# Patient Record
Sex: MALE | Race: WHITE | NOT HISPANIC OR LATINO | Employment: FULL TIME | URBAN - NONMETROPOLITAN AREA
[De-identification: names, ages, dates, MRNs, and addresses within clinical notes are randomized per-mention and may not be internally consistent; named-entity substitution may affect disease eponyms.]

---

## 2018-03-09 ENCOUNTER — TELEPHONE (OUTPATIENT)
Dept: UROLOGY | Facility: CLINIC | Age: 48
End: 2018-03-09

## 2018-03-09 DIAGNOSIS — R10.9 FLANK PAIN: Primary | ICD-10-CM

## 2018-03-12 ENCOUNTER — OFFICE VISIT (OUTPATIENT)
Dept: UROLOGY | Facility: CLINIC | Age: 48
End: 2018-03-12

## 2018-03-12 ENCOUNTER — HOSPITAL ENCOUNTER (OUTPATIENT)
Dept: GENERAL RADIOLOGY | Facility: HOSPITAL | Age: 48
Discharge: HOME | End: 2018-03-12
Attending: UROLOGY | Admitting: UROLOGY

## 2018-03-12 VITALS — TEMPERATURE: 96.4 F | HEIGHT: 74 IN | WEIGHT: 315 LBS | BODY MASS INDEX: 40.43 KG/M2

## 2018-03-12 DIAGNOSIS — N20.0 NEPHROLITHIASIS: ICD-10-CM

## 2018-03-12 DIAGNOSIS — R10.9 FLANK PAIN: ICD-10-CM

## 2018-03-12 DIAGNOSIS — R10.9 FLANK PAIN: Primary | ICD-10-CM

## 2018-03-12 LAB
BILIRUB BLD-MCNC: NEGATIVE MG/DL
CLARITY, POC: CLEAR
COLOR UR: YELLOW
GLUCOSE UR STRIP-MCNC: NEGATIVE MG/DL
KETONES UR QL: NEGATIVE
LEUKOCYTE EST, POC: NEGATIVE
NITRITE UR-MCNC: NEGATIVE MG/ML
PH UR: 5 [PH] (ref 5–8)
PROT UR STRIP-MCNC: NEGATIVE MG/DL
RBC # UR STRIP: NEGATIVE /UL
SP GR UR: 1.02 (ref 1–1.03)
UROBILINOGEN UR QL: NORMAL

## 2018-03-12 PROCEDURE — 74018 RADEX ABDOMEN 1 VIEW: CPT

## 2018-03-12 PROCEDURE — 81001 URINALYSIS AUTO W/SCOPE: CPT | Performed by: UROLOGY

## 2018-03-12 PROCEDURE — 99204 OFFICE O/P NEW MOD 45 MIN: CPT | Performed by: UROLOGY

## 2018-03-12 NOTE — PROGRESS NOTES
Subjective    Mr. Reddy is 47 y.o. male    Chief Complaint: Nephrolithiasis    History of Present Illness     Recurrent Nephrolithiasis  Patient is her for recurrent nephrolithiasis.  Location of stone is bilateral renal. Stones were found for workup of abdominal pain.  Pt is currently Asymptomatic.  Pt. Has had stone disease for severalyear(s). Risk factors for stone disease include obesity. Previous management of stone disease was ESWL.  Stone analysis was Unknown.  Metabolic workup revealed unknown.  Current treatment regimen includes hydration.  Associated symptoms include none.          The following portions of the patient's history were reviewed and updated as appropriate: allergies, current medications, past family history, past medical history, past social history, past surgical history and problem list.    Review of Systems   Constitutional: Negative for appetite change, chills, fever and unexpected weight change.   HENT: Negative for congestion, ear pain, facial swelling, hearing loss, nosebleeds, trouble swallowing and voice change.    Eyes: Negative for photophobia, pain, discharge and visual disturbance.   Respiratory: Negative for cough, choking, chest tightness and shortness of breath.    Cardiovascular: Negative for chest pain and palpitations.   Gastrointestinal: Negative for abdominal distention, abdominal pain, blood in stool, constipation, diarrhea, nausea and vomiting.   Endocrine: Negative for cold intolerance, heat intolerance and polydipsia.   Genitourinary: Positive for flank pain. Negative for discharge, dysuria, frequency, genital sores, hematuria, scrotal swelling, testicular pain and urgency.   Musculoskeletal: Negative for arthralgias, joint swelling, neck pain and neck stiffness.   Skin: Negative for pallor and rash.   Allergic/Immunologic: Negative for immunocompromised state.   Neurological: Negative for dizziness, tremors, seizures, syncope, light-headedness and headaches.  "  Hematological: Negative for adenopathy. Does not bruise/bleed easily.   Psychiatric/Behavioral: Negative for agitation, confusion, dysphoric mood, hallucinations, self-injury and suicidal ideas.       No current outpatient prescriptions on file.    Past Medical History:   Diagnosis Date   • Kidney stone        Past Surgical History:   Procedure Laterality Date   • BACK SURGERY     • EXTRACORPOREAL SHOCK WAVE LITHOTRIPSY (ESWL)         Social History     Social History   • Marital status:      Social History Main Topics   • Smoking status: Never Smoker   • Smokeless tobacco: Never Used   • Drug use: Unknown     Other Topics Concern   • Not on file       Family History   Problem Relation Age of Onset   • No Known Problems Father    • No Known Problems Mother        Objective    Temp 96.4 °F (35.8 °C)   Ht 188 cm (74\")   Wt (!) 159 kg (350 lb)   BMI 44.94 kg/m²     Physical Exam   Constitutional: He is oriented to person, place, and time. He appears well-developed and well-nourished. No distress.   HENT:   Head: Normocephalic and atraumatic.   Right Ear: External ear and ear canal normal.   Left Ear: External ear and ear canal normal.   Nose: No nasal deformity. No epistaxis.   Mouth/Throat: Oropharynx is clear and moist. Mucous membranes are not pale, not dry and not cyanotic. Normal dentition. No oropharyngeal exudate.   Neck: Trachea normal. No tracheal tenderness present. No tracheal deviation present. No thyroid mass and no thyromegaly present.   Pulmonary/Chest: Effort normal. No accessory muscle usage. No respiratory distress. Chest wall is not dull to percussion (No flatness or hyperresonance). He exhibits no mass and no tenderness.   On palpation, no tactile fremitus. All movements are symmetric. No intercostal retraction noted.    Abdominal: Soft. Normal appearance. He exhibits no distension and no mass. There is no hepatosplenomegaly. There is no tenderness. No hernia.   Rectal examination or " stool specimen is not indicated.    Musculoskeletal:   Normal gait and station. The spine, ribs, and pelvis are examined. No obvious misalignment or asymmetry. ROM is reasonable for age. No instability. No obvious atrophy, flaccidity or spasticity.    Lymphadenopathy:     He has no cervical adenopathy.        Right: No inguinal adenopathy present.        Left: No inguinal adenopathy present.   Neurological: He is alert and oriented to person, place, and time.   Skin: Skin is warm, dry and intact. No lesion and no rash noted. He is not diaphoretic. No cyanosis. No pallor. Nails show no clubbing.   On palpation, there were no induration, subcutaneous nodules, or tightening   Psychiatric: His speech is normal and behavior is normal. Judgment and thought content normal. His mood appears not anxious. His affect is not labile. He does not exhibit a depressed mood.   Vitals reviewed.    KUB independent review    A KUB is available for me to review today.  The image is inspected for a bowel gas pattern and the general bone structure of the spine and pelvis. The kidneys are then inspected closely.  Renal outline is noted if identifiable. The kidney, collecting system, and anticipated path of the ureter are examined for calcifications including those in the true pelvis.  This film reveals:    On the right there are no calcificaitons seen in the kidney or the expected course of the ureter. .    On the left there is a single renal stone measuring 6 mm.        CT independent review  The CT scan of the abdomen/pelvis done without contrast is available for me to review.  Treatment recommendations require an independent review.  First I scanned the liver, spleen, and bowel pattern.  The retroperitoneum including the major vessels and lymphatic packages are briefly reviewed.  This film as been reviewed by the radiologist to determine any non urologic abnormalities that are present.  The kidneys are closely inspected for size,  symmetry, contour, parenchymal thickness, perinephric reaction, presence of calcifications, and intrarenal dilation of the collecting system.  The ureters are inspected for their course, caliber, and any calcifications.  The bladder is inspected for its thickness, size, and presence of any calcifications.  This scan shows:    The right kidney appears multiple non obstructing nephrolithiasis    The left kidney appears 6mm non obstructing nephrolihiasis    The bladder appears normal on this non-contrasted CT scan.  The bladder appears normal in thickness.  There no masses or stones seen on this exam.    Results for orders placed or performed in visit on 03/12/18   POC Urinalysis Dipstick, Automated   Result Value Ref Range    Color Yellow Yellow, Straw, Dark Yellow, Liya    Clarity, UA Clear Clear    Glucose, UA Negative Negative, 1000 mg/dL (3+) mg/dL    Bilirubin Negative Negative    Ketones, UA Negative Negative    Specific Gravity  1.020 1.005 - 1.030    Blood, UA Negative Negative    pH, Urine 5.0 5.0 - 8.0    Protein, POC Negative Negative mg/dL    Urobilinogen, UA Normal Normal    Leukocytes Negative Negative    Nitrite, UA Negative Negative     Assessment and Plan    Randell was seen today for flank pain.    Diagnoses and all orders for this visit:    Flank pain  -     POC Urinalysis Dipstick, Automated    Nephrolithiasis    I recommended a full metabolic evaluation with serum calcium, PTH and uric acid.  I Jaxon reviewed his images with the findings mentioned above.  I do not think he has a stone in his right ureter is described by the radiologist.  He is having minimal pain always stones on the right kidney are very passable anyway.  This was a stone in the left lower pole I have recommended observation as he had had difficulty in the past with ESWL.  We will get a full metabolic evaluation to 24 urine.    Watchful Waiting  Mr. Reddy has been diagnosed with renal urolithiasis. The patient's options for  therapy are discussed based on the size, location, stone burden, and symptoms.  The decision has been made to follow these stones radiographically without treatment.  The patient understands the risks associated with the conservative approach, but this is a reasonable treatment plan.  The need to contract me for hematuria, fever, recurrent UTI's, flank pain or change in ideology is explained.

## 2018-03-16 ENCOUNTER — HOSPITAL ENCOUNTER (EMERGENCY)
Facility: HOSPITAL | Age: 48
Discharge: HOME | End: 2018-03-17
Attending: EMERGENCY MEDICINE | Admitting: EMERGENCY MEDICINE

## 2018-03-16 DIAGNOSIS — T78.40XA ALLERGIC REACTION, INITIAL ENCOUNTER: Primary | ICD-10-CM

## 2018-03-16 PROCEDURE — 99283 EMERGENCY DEPT VISIT LOW MDM: CPT

## 2018-03-16 RX ORDER — METHYLPREDNISOLONE SODIUM SUCCINATE 125 MG/2ML
125 INJECTION, POWDER, LYOPHILIZED, FOR SOLUTION INTRAMUSCULAR; INTRAVENOUS ONCE
Status: COMPLETED | OUTPATIENT
Start: 2018-03-16 | End: 2018-03-17

## 2018-03-16 RX ORDER — DIPHENHYDRAMINE HYDROCHLORIDE 50 MG/ML
25 INJECTION INTRAMUSCULAR; INTRAVENOUS ONCE
Status: COMPLETED | OUTPATIENT
Start: 2018-03-16 | End: 2018-03-17

## 2018-03-16 RX ORDER — FAMOTIDINE 10 MG/ML
20 INJECTION, SOLUTION INTRAVENOUS ONCE
Status: COMPLETED | OUTPATIENT
Start: 2018-03-16 | End: 2018-03-17

## 2018-03-16 RX ORDER — SODIUM CHLORIDE 0.9 % (FLUSH) 0.9 %
10 SYRINGE (ML) INJECTION AS NEEDED
Status: DISCONTINUED | OUTPATIENT
Start: 2018-03-16 | End: 2018-03-17 | Stop reason: HOSPADM

## 2018-03-16 RX ORDER — EPINEPHRINE 0.3 MG/.3ML
0.3 INJECTION SUBCUTANEOUS ONCE
Status: COMPLETED | OUTPATIENT
Start: 2018-03-16 | End: 2018-03-17

## 2018-03-17 VITALS
DIASTOLIC BLOOD PRESSURE: 92 MMHG | OXYGEN SATURATION: 94 % | SYSTOLIC BLOOD PRESSURE: 169 MMHG | HEART RATE: 84 BPM | TEMPERATURE: 97.8 F | HEIGHT: 74 IN | RESPIRATION RATE: 18 BRPM | BODY MASS INDEX: 40.43 KG/M2 | WEIGHT: 315 LBS

## 2018-03-17 PROCEDURE — 25010000002 DIPHENHYDRAMINE PER 50 MG: Performed by: EMERGENCY MEDICINE

## 2018-03-17 PROCEDURE — 96375 TX/PRO/DX INJ NEW DRUG ADDON: CPT

## 2018-03-17 PROCEDURE — 96372 THER/PROPH/DIAG INJ SC/IM: CPT

## 2018-03-17 PROCEDURE — 96361 HYDRATE IV INFUSION ADD-ON: CPT

## 2018-03-17 PROCEDURE — 25010000002 METHYLPREDNISOLONE PER 125 MG: Performed by: EMERGENCY MEDICINE

## 2018-03-17 PROCEDURE — 96374 THER/PROPH/DIAG INJ IV PUSH: CPT

## 2018-03-17 RX ORDER — PREDNISONE 20 MG/1
20 TABLET ORAL 2 TIMES DAILY
Qty: 10 TABLET | Refills: 0 | Status: SHIPPED | OUTPATIENT
Start: 2018-03-17 | End: 2018-03-22

## 2018-03-17 RX ADMIN — FAMOTIDINE 20 MG: 10 INJECTION INTRAVENOUS at 00:41

## 2018-03-17 RX ADMIN — EPINEPHRINE 0.3 MG: 0.3 INJECTION INTRAMUSCULAR at 00:25

## 2018-03-17 RX ADMIN — METHYLPREDNISOLONE SODIUM SUCCINATE 125 MG: 125 INJECTION, POWDER, FOR SOLUTION INTRAMUSCULAR; INTRAVENOUS at 00:44

## 2018-03-17 RX ADMIN — SODIUM CHLORIDE 1000 ML: 9 INJECTION, SOLUTION INTRAVENOUS at 00:38

## 2018-03-17 RX ADMIN — DIPHENHYDRAMINE HYDROCHLORIDE 25 MG: 50 INJECTION, SOLUTION INTRAMUSCULAR; INTRAVENOUS at 00:39

## 2018-03-17 NOTE — DISCHARGE INSTRUCTIONS
Allergies  An allergy is when your body reacts to a substance in a way that is not normal. An allergic reaction can happen after you:  · Eat something.  · Breathe in something.  · Touch something.  You can be allergic to:  · Things that are only around during certain seasons, like molds and pollens.  · Foods.  · Drugs.  · Insects.  · Animal dander.  What are the signs or symptoms?  · Puffiness (swelling). This may happen on the lips, face, tongue, mouth, or throat.  · Sneezing.  · Coughing.  · Breathing loudly (wheezing).  · Stuffy nose.  · Tingling in the mouth.  · A rash.  · Itching.  · Itchy, red, puffy areas of skin (hives).  · Watery eyes.  · Throwing up (vomiting).  · Watery poop (diarrhea).  · Dizziness.  · Feeling faint or fainting.  · Trouble breathing or swallowing.  · A tight feeling in the chest.  · A fast heartbeat.  How is this diagnosed?  Allergies can be diagnosed with:  · A medical and family history.  · Skin tests.  · Blood tests.  · A food diary. A food diary is a record of all the foods, drinks, and symptoms you have each day.  · The results of an elimination diet. This diet involves making sure not to eat certain foods and then seeing what happens when you start eating them again.  How is this treated?  There is no cure for allergies, but allergic reactions can be treated with medicine. Severe reactions usually need to be treated at a hospital.  How is this prevented?  The best way to prevent an allergic reaction is to avoid the thing you are allergic to. Allergy shots and medicines can also help prevent reactions in some cases.  This information is not intended to replace advice given to you by your health care provider. Make sure you discuss any questions you have with your health care provider.  Document Released: 04/14/2014 Document Revised: 08/14/2017 Document Reviewed: 09/29/2015  [employer] Interactive Patient Education © 2017 [employer]

## 2018-03-17 NOTE — ED PROVIDER NOTES
Subjective   History of Present Illness     47-year-old male is complaining about an allergic reaction.  The patient states that he was eating dinner and while doing so he began to feel his skin breakout.  Patient took a photo which appears to be urticarial.  He took a Benadryl home.,  Interview the patient reports his rash is resolved, however he reports that his voice feels hoarse.    Review of Systems   HENT: Positive for voice change.    Respiratory: Negative for apnea, cough, choking, chest tightness, shortness of breath, wheezing and stridor.    Cardiovascular: Negative for chest pain, palpitations and leg swelling.   Gastrointestinal: Negative for abdominal distention, abdominal pain, anal bleeding, blood in stool, constipation, diarrhea, nausea, rectal pain and vomiting.   Neurological: Negative for dizziness, syncope, facial asymmetry, weakness, light-headedness and headaches.       Past Medical History:   Diagnosis Date   • Kidney stone        No Known Allergies    Past Surgical History:   Procedure Laterality Date   • BACK SURGERY     • EXTRACORPOREAL SHOCK WAVE LITHOTRIPSY (ESWL)         Family History   Problem Relation Age of Onset   • No Known Problems Father    • No Known Problems Mother        Social History     Social History   • Marital status:      Social History Main Topics   • Smoking status: Never Smoker   • Smokeless tobacco: Never Used   • Drug use: Unknown     Other Topics Concern   • Not on file           Objective   Physical Exam   Constitutional: He is oriented to person, place, and time. He appears well-developed.   HENT:   Head: Normocephalic.   Left Ear: External ear normal.   Nose: Nose normal.   Mouth/Throat: Oropharynx is clear and moist.   Eyes: EOM are normal. Pupils are equal, round, and reactive to light.   Neck: No JVD present. No tracheal deviation present. No thyromegaly present.   Cardiovascular: Normal rate, regular rhythm and normal heart sounds.  Exam reveals no  gallop and no friction rub.    No murmur heard.  Pulmonary/Chest: Effort normal and breath sounds normal. No respiratory distress. He has no wheezes. He has no rales. He exhibits no tenderness.   Abdominal: Soft. He exhibits no distension and no mass. There is no tenderness. There is no rebound and no guarding. No hernia.   Musculoskeletal: Normal range of motion.   Lymphadenopathy:     He has no cervical adenopathy.   Neurological: He is alert and oriented to person, place, and time. He has normal reflexes. He displays normal reflexes. No cranial nerve deficit. Coordination normal.       Procedures         ED Course  ED Course   Comment By Time   On discharge reassessment, patient is resting comfortably, is in no distress, has no dyspnea, is tolerating secretions, has no wheezing, no rash.  Patient was instructed to return to the ER for any worsening symtoms.   Aniket Ellis DO 03/17 0301                  [facility]    Final diagnoses:   Allergic reaction, initial encounter            Aniket Ellis DO  03/17/18 0304

## 2018-03-19 NOTE — ED NOTES
"ED Call Back Questions    1. How are you doing since leaving the Emergency Department?    Better.  [facility]  2. Do you have any questions about your discharge instructions? No     3. Have you filled your new prescriptions yet? Yes   a. Do you have any questions about those medications? No     4. Were you able to make a follow-up appointment with the physician? Yes     5. Do you have a primary care physician? Yes   a. If No, would you like for me to set you up with one? N/A  i. If Yes, “I will have our ED  give you a call right back at this number to work with you on the best time for an appointment.”    6. We are always looking to get better at what we do. Do you have any suggestions for what we can do to be even better? No   a. If Yes, \"Thank you for sharing your concerns. I apologize. I will follow up with our manager and patient . Would you like someone to call you back?\" N/A    7. Is there anything else I can do for you? No     "

## 2018-04-16 DIAGNOSIS — N20.0 NEPHROLITHIASIS: ICD-10-CM

## 2018-04-17 LAB
CALCIUM SERPL-MCNC: 9.7 MG/DL (ref 8.4–10.4)
PTH-INTACT SERPL-MCNC: 20 PG/ML (ref 15–65)
URATE SERPL-MCNC: 6.2 MG/DL (ref 3.5–8.5)

## 2018-04-23 ENCOUNTER — RESULTS ENCOUNTER (OUTPATIENT)
Dept: UROLOGY | Facility: CLINIC | Age: 48
End: 2018-04-23

## 2018-04-23 DIAGNOSIS — N20.0 NEPHROLITHIASIS: ICD-10-CM

## 2018-05-02 ENCOUNTER — OFFICE VISIT (OUTPATIENT)
Dept: UROLOGY | Facility: CLINIC | Age: 48
End: 2018-05-02

## 2018-05-02 VITALS — BODY MASS INDEX: 40.43 KG/M2 | WEIGHT: 315 LBS | TEMPERATURE: 95.4 F | HEIGHT: 74 IN

## 2018-05-02 DIAGNOSIS — N20.0 NEPHROLITHIASIS: Primary | ICD-10-CM

## 2018-05-02 LAB
BILIRUB BLD-MCNC: NEGATIVE MG/DL
CLARITY, POC: CLEAR
COLOR UR: YELLOW
GLUCOSE UR STRIP-MCNC: NEGATIVE MG/DL
KETONES UR QL: NEGATIVE
LEUKOCYTE EST, POC: NEGATIVE
NITRITE UR-MCNC: NEGATIVE MG/ML
PH UR: 5 [PH] (ref 5–8)
PROT UR STRIP-MCNC: ABNORMAL MG/DL
RBC # UR STRIP: ABNORMAL /UL
SP GR UR: 1.03 (ref 1–1.03)
UROBILINOGEN UR QL: NORMAL

## 2018-05-02 PROCEDURE — 81001 URINALYSIS AUTO W/SCOPE: CPT | Performed by: UROLOGY

## 2018-05-02 PROCEDURE — 99213 OFFICE O/P EST LOW 20 MIN: CPT | Performed by: UROLOGY

## 2018-05-02 NOTE — PATIENT INSTRUCTIONS

## 2018-09-19 ENCOUNTER — TELEPHONE (OUTPATIENT)
Dept: UROLOGY | Facility: CLINIC | Age: 48
End: 2018-09-19

## 2018-09-19 NOTE — TELEPHONE ENCOUNTER
Patient called with kidney stone symptoms. He was not due until Nov and he has [facility] and no other ins. His [facility] auth has  so I advised him to [facility]. He voiced understanding.

## 2018-10-31 ENCOUNTER — TELEPHONE (OUTPATIENT)
Dept: UROLOGY | Facility: CLINIC | Age: 48
End: 2018-10-31

## 2018-10-31 NOTE — TELEPHONE ENCOUNTER
Spoke with Adrien at [facility] and received verbal auth #[id number] that now expires on 3/11/19. He will fax the auth when he has final.

## 2018-11-02 NOTE — PROGRESS NOTES
Subjective    Mr. Reddy is 48 y.o. male    Chief Complaint: Nephrolithiasis    History of Present Illness     Recurrent Nephrolithiasis  Patient is her for recurrent nephrolithiasis.  Location of stone is bilateral renal. Stones were found for workup of abdominal pain.  Pt is currently Asymptomatic.  Pt. Has had stone disease for severalyear(s). Risk factors for stone disease include obesity. Previous management of stone disease was ESWL.  Stone analysis was Unknown.  Metabolic workup revealed unknown.  Current treatment regimen includes hydration.  Associated symptoms include none.      The following portions of the patient's history were reviewed and updated as appropriate: allergies, current medications, past family history, past medical history, past social history, past surgical history and problem list.    Review of Systems   Constitutional: Negative for appetite change, chills, fever and unexpected weight change.   HENT: Negative for congestion, ear pain, facial swelling, hearing loss, nosebleeds, trouble swallowing and voice change.    Eyes: Negative for photophobia, pain, discharge and visual disturbance.   Respiratory: Negative for cough, choking, chest tightness and shortness of breath.    Cardiovascular: Negative for chest pain and palpitations.   Gastrointestinal: Negative for abdominal distention, abdominal pain, blood in stool, constipation, diarrhea, nausea and vomiting.   Endocrine: Negative for cold intolerance, heat intolerance and polydipsia.   Genitourinary: Positive for difficulty urinating. Negative for decreased urine volume, discharge, dysuria, enuresis, flank pain, frequency, genital sores, hematuria, penile pain, penile swelling, scrotal swelling, testicular pain and urgency.   Musculoskeletal: Negative for arthralgias, joint swelling, neck pain and neck stiffness.   Skin: Negative for pallor and rash.   Allergic/Immunologic: Negative for immunocompromised state.   Neurological: Negative  "for dizziness, tremors, seizures, syncope, light-headedness and headaches.   Hematological: Negative for adenopathy. Does not bruise/bleed easily.   Psychiatric/Behavioral: Negative for agitation, confusion, dysphoric mood, hallucinations, self-injury and suicidal ideas.         Current Outpatient Prescriptions:   •  PROAIR  (90 Base) MCG/ACT inhaler, , Disp: , Rfl:   •  traMADol-acetaminophen (ULTRACET) 37.5-325 MG per tablet, Take 1 tablet by mouth Every 6 (Six) Hours As Needed for Moderate Pain ., Disp: , Rfl:     Past Medical History:   Diagnosis Date   • Kidney stone        Past Surgical History:   Procedure Laterality Date   • BACK SURGERY     • EXTRACORPOREAL SHOCK WAVE LITHOTRIPSY (ESWL)         Social History     Social History   • Marital status:      Social History Main Topics   • Smoking status: Never Smoker   • Smokeless tobacco: Never Used   • Drug use: Unknown     Other Topics Concern   • Not on file       Family History   Problem Relation Age of Onset   • No Known Problems Father    • No Known Problems Mother        Objective    Temp 96.3 °F (35.7 °C)   Ht 188 cm (74\")   Wt (!) 158 kg (348 lb)   BMI 44.68 kg/m²     Physical Exam   Constitutional: He is oriented to person, place, and time. He appears well-developed and well-nourished. No distress.   Pulmonary/Chest: Effort normal.   Abdominal: Soft. He exhibits no distension and no mass. There is no tenderness. There is no rebound and no guarding. No hernia.   Neurological: He is alert and oriented to person, place, and time.   Skin: Skin is warm and dry. He is not diaphoretic.   Psychiatric: He has a normal mood and affect.   Vitals reviewed.    KUB independent review    A KUB is available for me to review today.  The image is inspected for a bowel gas pattern and the general bone structure of the spine and pelvis. The kidneys are then inspected closely.  Renal outline is noted if identifiable. The kidney, collecting system, and " anticipated path of the ureter are examined for calcifications including those in the true pelvis.  This film reveals:    On the right there are no calcificaitons seen in the kidney or the expected course of the ureter. .    On the left there is a single renal stone measuring 8 mm.          Results for orders placed or performed in visit on 11/07/18   POC Urinalysis Dipstick, Multipro   Result Value Ref Range    Color Yellow Yellow, Straw, Dark Yellow, Liya    Clarity, UA Clear Clear    Glucose, UA Negative Negative, 1000 mg/dL (3+) mg/dL    Bilirubin Negative Negative    Ketones, UA Negative Negative    Specific Gravity  1.030 1.005 - 1.030    Blood, UA Negative Negative    pH, Urine 6.0 5.0 - 8.0    Protein, POC Negative Negative mg/dL    Urobilinogen, UA Normal Normal    Nitrite, UA Negative Negative    Leukocytes Negative Negative   Patient's Body mass index is 44.68 kg/m². BMI is above normal parameters. Recommendations include: educational material.    Assessment and Plan    Diagnoses and all orders for this visit:    Nephrolithiasis  -     POC Urinalysis Dipstick, Multipro    Patient non-crushing left-sided stone.  His asymptomatic from this.  I am going to continue to watch this 7 mm stone in the left lower pole.

## 2018-11-07 ENCOUNTER — HOSPITAL ENCOUNTER (OUTPATIENT)
Dept: GENERAL RADIOLOGY | Facility: HOSPITAL | Age: 48
Discharge: HOME | End: 2018-11-07
Attending: UROLOGY | Admitting: UROLOGY

## 2018-11-07 ENCOUNTER — OFFICE VISIT (OUTPATIENT)
Dept: UROLOGY | Facility: CLINIC | Age: 48
End: 2018-11-07

## 2018-11-07 VITALS — TEMPERATURE: 96.3 F | WEIGHT: 315 LBS | HEIGHT: 74 IN | BODY MASS INDEX: 40.43 KG/M2

## 2018-11-07 DIAGNOSIS — N13.8 BPH WITH URINARY OBSTRUCTION: ICD-10-CM

## 2018-11-07 DIAGNOSIS — N20.0 NEPHROLITHIASIS: Primary | ICD-10-CM

## 2018-11-07 DIAGNOSIS — N20.0 NEPHROLITHIASIS: ICD-10-CM

## 2018-11-07 DIAGNOSIS — N40.1 BPH WITH URINARY OBSTRUCTION: ICD-10-CM

## 2018-11-07 LAB
BILIRUB BLD-MCNC: NEGATIVE MG/DL
CLARITY, POC: CLEAR
COLOR UR: YELLOW
GLUCOSE UR STRIP-MCNC: NEGATIVE MG/DL
KETONES UR QL: NEGATIVE
LEUKOCYTE EST, POC: NEGATIVE
NITRITE UR-MCNC: NEGATIVE MG/ML
PH UR: 6 [PH] (ref 5–8)
PROT UR STRIP-MCNC: NEGATIVE MG/DL
RBC # UR STRIP: NEGATIVE /UL
SP GR UR: 1.03 (ref 1–1.03)
UROBILINOGEN UR QL: NORMAL

## 2018-11-07 PROCEDURE — 74018 RADEX ABDOMEN 1 VIEW: CPT

## 2018-11-07 PROCEDURE — 99213 OFFICE O/P EST LOW 20 MIN: CPT | Performed by: UROLOGY

## 2018-11-07 PROCEDURE — 81001 URINALYSIS AUTO W/SCOPE: CPT | Performed by: UROLOGY

## 2018-11-07 NOTE — PATIENT INSTRUCTIONS

## 2019-08-07 ENCOUNTER — TRANSCRIBE ORDERS (OUTPATIENT)
Dept: ADMINISTRATIVE | Facility: HOSPITAL | Age: 49
End: 2019-08-07

## 2019-08-07 DIAGNOSIS — M54.16 RADICULOPATHY, LUMBAR REGION: Primary | ICD-10-CM

## 2019-08-14 ENCOUNTER — TRANSCRIBE ORDERS (OUTPATIENT)
Dept: ADMINISTRATIVE | Facility: HOSPITAL | Age: 49
End: 2019-08-14

## 2019-08-14 ENCOUNTER — HOSPITAL ENCOUNTER (OUTPATIENT)
Dept: GENERAL RADIOLOGY | Facility: HOSPITAL | Age: 49
Discharge: HOME | End: 2019-08-14

## 2019-08-14 ENCOUNTER — HOSPITAL ENCOUNTER (OUTPATIENT)
Dept: MRI IMAGING | Facility: HOSPITAL | Age: 49
Discharge: HOME | End: 2019-08-14
Admitting: NURSE PRACTITIONER

## 2019-08-14 DIAGNOSIS — M54.16 RADICULOPATHY, LUMBAR REGION: ICD-10-CM

## 2019-08-14 DIAGNOSIS — M54.5 LOW BACK PAIN, UNSPECIFIED BACK PAIN LATERALITY, UNSPECIFIED CHRONICITY, WITH SCIATICA PRESENCE UNSPECIFIED: Primary | ICD-10-CM

## 2019-08-14 LAB — CREAT BLDA-MCNC: 1 MG/DL (ref 0.6–1.3)

## 2019-08-14 PROCEDURE — A9577 INJ MULTIHANCE: HCPCS | Performed by: NURSE PRACTITIONER

## 2019-08-14 PROCEDURE — 0 GADOBENATE DIMEGLUMINE 529 MG/ML SOLUTION: Performed by: NURSE PRACTITIONER

## 2019-08-14 PROCEDURE — 72158 MRI LUMBAR SPINE W/O & W/DYE: CPT

## 2019-08-14 PROCEDURE — 72100 X-RAY EXAM L-S SPINE 2/3 VWS: CPT

## 2019-08-14 PROCEDURE — 82565 ASSAY OF CREATININE: CPT

## 2019-08-14 RX ADMIN — GADOBENATE DIMEGLUMINE 20 ML: 529 INJECTION, SOLUTION INTRAVENOUS at 12:06

## 2019-09-14 NOTE — PROGRESS NOTES
"Primary Care Provider: rBian Spear MD  Requesting Provider: Audrey Baeza APRN    Chief Complaint:   Chief Complaint   Patient presents with   • Back Pain     Patient is here today for back pain and bilateral numbness and tingling in both legs down into both feet.     HPI  History of Present Illness  Consultation today at the request of EMERY Baez    Randell Reddy is a 48 y.o. male who presents today with a complaint of lumbar back and bilateral leg pain, 75% back, 25% legs.  Multiple previous lumbar surgeries; Discectomies at L3-4 and L4-5 in 1997 while in the  and a laminectomy at L5 in 2001.  No recent injuries.    Gradual progressive onset of lower back discomfort over the past 2-3 years.  He states his lower back discomfort is intermittent in nature.  He describes his lower back pain as an \"ache\" that intermittently radiates down the lateral and anterior aspects of the bilateral leg, occasionally extending to include the plantar surface of the foot; right >left.  He additionally reports intermittent numbness and paresthesias in the same distributions, as well as left leg weakness.  He denies bilateral thigh pain.  His lower back discomfort worsens with prolonged walking, standing, and sitting with his back and supported and decreases in some extent with recumbency and use of OTC ibuprofen.  His back pain is unchanged with lying down.  He denies fevers, chills, night sweats, unexplained weight loss, saddle anesthesia, or bowel or bladder dysfunction.  He currently rates the severity of his symptoms 8/10.  No additional concerns at this time.    Mr. Reddy has not completed nor participated in a dedicated course of physician directed physical therapy, massage and/or chiropractic care, nor been evaluated by pain management.    Oswestry Disability Index (Trisha et al, 1980)   Score   Pain Intensity 1   Personal Care 0   Lifting 1   Walking 2   Sitting 1   Standing 4   Sleeping 1   Sex Life " (if applicable) 1   Social Life 0   Traveling 0   Previous Treatment Yes   TOTAL = Score x2  (or 2.22 if one NA) 22     SCORE INTERPRETATION OF THE OSWESTRY LBP DISABILITY QUESTIONNAIRE   20-40% Moderate disability This group experiences more pain and problems with sitting, lifting, and standing. Travel and social life are more difficult and they may well be off work. Personal care, sexual activity, and sleeping are not grossly affected, and the back condition can usually be managed by conservative means.     ROS  Review of Systems   Constitutional: Positive for activity change, fatigue and unexpected weight change.   HENT: Positive for hearing loss.    Eyes: Negative.    Respiratory: Negative.    Cardiovascular: Negative.    Gastrointestinal: Negative.    Endocrine: Negative.    Genitourinary: Negative.    Musculoskeletal: Positive for back pain and neck stiffness.   Skin: Negative.    Allergic/Immunologic: Negative.    Neurological: Positive for weakness and numbness.   Hematological: Negative.    Psychiatric/Behavioral: Negative.    All other systems reviewed and are negative.      Past Medical History:   Diagnosis Date   • Kidney stone        Past Surgical History:   Procedure Laterality Date   • BACK SURGERY     • EXTRACORPOREAL SHOCK WAVE LITHOTRIPSY (ESWL)         Family History: family history includes No Known Problems in his father and mother.    Social History:  reports that he has never smoked. He has never used smokeless tobacco.    Medications:    Current Outpatient Medications:   •  EPINEPHrine (EPIPEN) 0.3 MG/0.3ML solution auto-injector injection, , Disp: , Rfl:   •  metFORMIN (GLUCOPHAGE) 1000 MG tablet, , Disp: , Rfl:   •  PROAIR  (90 Base) MCG/ACT inhaler, , Disp: , Rfl:   •  traMADol-acetaminophen (ULTRACET) 37.5-325 MG per tablet, Take 1 tablet by mouth Every 6 (Six) Hours As Needed for Moderate Pain ., Disp: , Rfl:     Allergies:  Patient has no known allergies.    Objective   Ht 187 cm  "(73.62\")   Wt (!) 162 kg (357 lb)   BMI 46.31 kg/m²   Physical Exam   Constitutional: He is oriented to person, place, and time. He appears well-developed and well-nourished.  Non-toxic appearance. He does not have a sickly appearance. He does not appear ill. No distress.   BMI 46.3   HENT:   Head: Normocephalic and atraumatic.   Right Ear: Decreased hearing is noted.   Left Ear: Decreased hearing is noted.   Mouth/Throat: Mucous membranes are normal.   Bilateral hearing aid devices   Eyes: Conjunctivae and EOM are normal. Pupils are equal, round, and reactive to light.   Neck: Trachea normal and full passive range of motion without pain. Neck supple.   Cardiovascular: Normal rate and regular rhythm.   Pulmonary/Chest: Effort normal. No accessory muscle usage. No apnea, no tachypnea and no bradypnea. No respiratory distress.   Abdominal: Soft. Normal appearance.   Neurological: He is alert and oriented to person, place, and time. Gait normal. GCS eye subscore is 4. GCS verbal subscore is 5. GCS motor subscore is 6.   Reflex Scores:       Tricep reflexes are 2+ on the right side and 2+ on the left side.       Bicep reflexes are 2+ on the right side and 2+ on the left side.       Brachioradialis reflexes are 2+ on the right side and 2+ on the left side.       Patellar reflexes are 2+ on the right side and 2+ on the left side.       Achilles reflexes are 2+ on the right side and 1+ on the left side.  Skin: Skin is warm, dry and intact.   Psychiatric: He has a normal mood and affect. His speech is normal and behavior is normal.   Nursing note and vitals reviewed.    Neurologic Exam     Mental Status   Oriented to person, place, and time.   Attention: normal. Concentration: normal.   Speech: speech is normal   Level of consciousness: alert    Cranial Nerves     CN II   Visual fields full to confrontation.     CN III, IV, VI   Pupils are equal, round, and reactive to light.  Extraocular motions are normal.     CN V "   Facial sensation intact.     CN VII   Facial expression full, symmetric.     CN VIII   CN VIII normal.     CN IX, X   CN IX normal.     CN XI   CN XI normal.     Motor Exam   Muscle bulk: normal  Overall muscle tone: normal  Right arm tone: normal  Left arm tone: normal  Right arm pronator drift: absent  Left arm pronator drift: absent  Right leg tone: normal  Left leg tone: normal    Strength   Right deltoid: 5/5  Left deltoid: 5/5  Right biceps: 5/5  Left biceps: 5/5  Right triceps: 5/5  Left triceps: 5/5  Right wrist extension: 5/5  Left wrist extension: 5/5  Right iliopsoas: 5/5  Left iliopsoas: 5/5  Right quadriceps: 5/5  Left quadriceps: 5/5  Right anterior tibial: 5/5  Left anterior tibial: 5/5  Right posterior tibial: 5/5  Left posterior tibial: 5/5  Mild muscle atrophy noted to the left lower extremity; strength maintained.     Sensory Exam   Light touch normal.     Gait, Coordination, and Reflexes     Gait  Gait: normal    Tremor   Resting tremor: absent  Intention tremor: absent  Action tremor: absent    Reflexes   Right brachioradialis: 2+  Left brachioradialis: 2+  Right biceps: 2+  Left biceps: 2+  Right triceps: 2+  Left triceps: 2+  Right patellar: 2+  Left patellar: 2+  Right achilles: 2+  Left achilles: 1+  Right : 4+  Left : 4+  Right plantar: normal  Left plantar: equivocal  Right Morales: absent  Left Morales: absent  Right ankle clonus: absent  Left ankle clonus: absent  Right pendular knee jerk: absent  Left pendular knee jerk: absent  Inability to maintain gait on heel of left foot     Imaging: (independent review and interpretation)  8/14/19 8/14/19              ASSESSMENT and PLAN  Randell Reddy is a 48 y.o. male with a significant medical history of prior discectomies at L3-4 and L4-5 and a laminectomy at L5, hearing loss, and morbid obesity.  He presents with a new problem of worsening lumbar back and intermittent bilateral lower leg pain, right> left with intermittent  numbness and paresthesias.  Physical exam findings of BMI 46.3, bilateral hearing loss with hearing aid devices, 1+ left Achilles reflex, equivocal left plantar reflex, mild left lower extremity muscle atrophy, and inability to maintain gait on heel of left foot.  Strength well-maintained, no Morales's or clonus noted.  His imaging shows an multilevel facet arthropathy and ligamentous flavum hypertrophy, retrolisthesis at L4-5 and a mid to left lateral disc protrusion resulting in moderate to severe central canal and bilateral foraminal stenosis; disc space height loss at L5-S1 resulting in mild central canal narrowing and foraminal stenosis greater on the left.  Imaging discussed and reviewed with patient.    Lumbar back pain  Considering Mr. Huff lumbar back pain is relatively unchanged with lying down, I would like to proceed today by obtaining x-rays of the lumbar spine complete with flexion and extension to assess for worsening listhesis at L4-5 that could suggest instability of the lumbar spine.  For first line conservative care of lumbar back pain, I would like to send Mr. Reddy for a dedicated course of physician directed physical therapy consisting of 2-3 times a week for 6 weeks.  Unless contraindicated, nonsteroidal anti-inflammatories, Ibuprofen  or naproxen sodium, and Tylenol per package instructions for pain.  B/R/AE and use discussed.    Numbness and tingling in the bilateral legs  Regarding the numbness and tingling, I will send the patient for an EMG/NCS of the his bilateral lower extremities to confirm or refute radiculopathy from the lumbar spine, lower extremity nerve involvement, or polyneuropathy secondary to a chronic condition.        Morbid obesity  BMI today is 46.3.  Information on the DASH diet provided in the AVS.  We will continue to provided diet and exercise information with the goal of weight loss at each scheduled appointment.     Once all testing is complete we will have Mr. Reddy  return for reassessment with Dr. Paz and to discuss the need for surgical intervention.  I advised the patient to call and return sooner for new or worsening complaints of weakness, paresthesias, gait disturbances, or any additional concerns.  Treatment options discussed in detail with Randell and he voiced understanding.  Mr. Reddy agrees with this plan of care.    Randell was seen today for back pain.    Diagnoses and all orders for this visit:    Lumbar pain  -     XR Spine Lumbar Complete With Flex & Ext; Future  -     Ambulatory Referral to Physical Therapy Evaluate and treat (3x a week for 6 weeks); Stretching, ROM, Strengthening    Pain in both lower extremities    Numbness and tingling of both legs  -     EMG & Nerve Conduction Test; Future    Class 3 severe obesity due to excess calories with serious comorbidity and body mass index (BMI) of 45.0 to 49.9 in adult (CMS/[facility])      Return for follow up with Dr. Paz after pt.    Thank you for this Consultation and the opportunity to participate in Randell's care.    Sincerely,  EMERY Jin    Level of Risk: Moderate due to: undiagnosed new problem  MDM: Moderate Complexity  (Mod = 73915, High = 46923)

## 2019-09-19 ENCOUNTER — OFFICE VISIT (OUTPATIENT)
Dept: NEUROSURGERY | Facility: CLINIC | Age: 49
End: 2019-09-19

## 2019-09-19 ENCOUNTER — HOSPITAL ENCOUNTER (OUTPATIENT)
Dept: GENERAL RADIOLOGY | Facility: HOSPITAL | Age: 49
Discharge: HOME | End: 2019-09-19
Admitting: NURSE PRACTITIONER

## 2019-09-19 VITALS — BODY MASS INDEX: 40.43 KG/M2 | WEIGHT: 315 LBS | HEIGHT: 74 IN

## 2019-09-19 DIAGNOSIS — R20.2 NUMBNESS AND TINGLING OF BOTH LEGS: ICD-10-CM

## 2019-09-19 DIAGNOSIS — M79.604 PAIN IN BOTH LOWER EXTREMITIES: ICD-10-CM

## 2019-09-19 DIAGNOSIS — M54.50 LUMBAR PAIN: Primary | ICD-10-CM

## 2019-09-19 DIAGNOSIS — E66.01 CLASS 3 SEVERE OBESITY DUE TO EXCESS CALORIES WITH SERIOUS COMORBIDITY AND BODY MASS INDEX (BMI) OF 45.0 TO 49.9 IN ADULT (HCC): ICD-10-CM

## 2019-09-19 DIAGNOSIS — M54.50 LUMBAR PAIN: ICD-10-CM

## 2019-09-19 DIAGNOSIS — M79.605 PAIN IN BOTH LOWER EXTREMITIES: ICD-10-CM

## 2019-09-19 DIAGNOSIS — R20.0 NUMBNESS AND TINGLING OF BOTH LEGS: ICD-10-CM

## 2019-09-19 PROBLEM — E66.09 OBESITY DUE TO EXCESS CALORIES: Status: ACTIVE | Noted: 2019-09-19

## 2019-09-19 PROBLEM — E66.9 OBESITY: Status: ACTIVE | Noted: 2019-09-19

## 2019-09-19 PROCEDURE — 99204 OFFICE O/P NEW MOD 45 MIN: CPT | Performed by: NURSE PRACTITIONER

## 2019-09-19 PROCEDURE — 72114 X-RAY EXAM L-S SPINE BENDING: CPT

## 2019-09-19 RX ORDER — EPINEPHRINE 0.3 MG/.3ML
0.3 INJECTION SUBCUTANEOUS ONCE
Status: ON HOLD | COMMUNITY
Start: 2019-07-26 | End: 2022-07-26 | Stop reason: SDUPTHER

## 2019-09-19 NOTE — PATIENT INSTRUCTIONS
"DASH Eating Plan  DASH stands for \"Dietary Approaches to Stop Hypertension.\" The DASH eating plan is a healthy eating plan that has been shown to reduce high blood pressure (hypertension). It may also reduce your risk for type 2 diabetes, heart disease, and stroke. The DASH eating plan may also help with weight loss.  What are tips for following this plan?    General guidelines  · Avoid eating more than 2,300 mg (milligrams) of salt (sodium) a day. If you have hypertension, you may need to reduce your sodium intake to 1,500 mg a day.  · Limit alcohol intake to no more than 1 drink a day for nonpregnant women and 2 drinks a day for men. One drink equals 12 oz of beer, 5 oz of wine, or 1½ oz of hard liquor.  · Work with your health care provider to maintain a healthy body weight or to lose weight. Ask what an ideal weight is for you.  · Get at least 30 minutes of exercise that causes your heart to beat faster (aerobic exercise) most days of the week. Activities may include walking, swimming, or biking.  · Work with your health care provider or diet and nutrition specialist (dietitian) to adjust your eating plan to your individual calorie needs.  Reading food labels    · Check food labels for the amount of sodium per serving. Choose foods with less than 5 percent of the Daily Value of sodium. Generally, foods with less than 300 mg of sodium per serving fit into this eating plan.  · To find whole grains, look for the word \"whole\" as the first word in the ingredient list.  Shopping  · Buy products labeled as \"low-sodium\" or \"no salt added.\"  · Buy fresh foods. Avoid canned foods and premade or frozen meals.  Cooking  · Avoid adding salt when cooking. Use salt-free seasonings or herbs instead of table salt or sea salt. Check with your health care provider or pharmacist before using salt substitutes.  · Do not martin foods. Cook foods using healthy methods such as baking, boiling, grilling, and broiling instead.  · Cook with " heart-healthy oils, such as olive, canola, soybean, or sunflower oil.  Meal planning  · Eat a balanced diet that includes:  ? 5 or more servings of fruits and vegetables each day. At each meal, try to fill half of your plate with fruits and vegetables.  ? Up to 6-8 servings of whole grains each day.  ? Less than 6 oz of lean meat, poultry, or fish each day. A 3-oz serving of meat is about the same size as a deck of cards. One egg equals 1 oz.  ? 2 servings of low-fat dairy each day.  ? A serving of nuts, seeds, or beans 5 times each week.  ? Heart-healthy fats. Healthy fats called Omega-3 fatty acids are found in foods such as flaxseeds and coldwater fish, like sardines, salmon, and mackerel.  · Limit how much you eat of the following:  ? Canned or prepackaged foods.  ? Food that is high in trans fat, such as fried foods.  ? Food that is high in saturated fat, such as fatty meat.  ? Sweets, desserts, sugary drinks, and other foods with added sugar.  ? Full-fat dairy products.  · Do not salt foods before eating.  · Try to eat at least 2 vegetarian meals each week.  · Eat more home-cooked food and less restaurant, buffet, and fast food.  · When eating at a restaurant, ask that your food be prepared with less salt or no salt, if possible.  What foods are recommended?  The items listed may not be a complete list. Talk with your dietitian about what dietary choices are best for you.  Grains  Whole-grain or whole-wheat bread. Whole-grain or whole-wheat pasta. Brown rice. Oatmeal. Quinoa. Bulgur. Whole-grain and low-sodium cereals. Marline bread. Low-fat, low-sodium crackers. Whole-wheat flour tortillas.  Vegetables  Fresh or frozen vegetables (raw, steamed, roasted, or grilled). Low-sodium or reduced-sodium tomato and vegetable juice. Low-sodium or reduced-sodium tomato sauce and tomato paste. Low-sodium or reduced-sodium canned vegetables.  Fruits  All fresh, dried, or frozen fruit. Canned fruit in natural juice (without  added sugar).  Meat and other protein foods  Skinless chicken or turkey. Ground chicken or turkey. Pork with fat trimmed off. Fish and seafood. Egg whites. Dried beans, peas, or lentils. Unsalted nuts, nut butters, and seeds. Unsalted canned beans. Lean cuts of beef with fat trimmed off. Low-sodium, lean deli meat.  Dairy  Low-fat (1%) or fat-free (skim) milk. Fat-free, low-fat, or reduced-fat cheeses. Nonfat, low-sodium ricotta or cottage cheese. Low-fat or nonfat yogurt. Low-fat, low-sodium cheese.  Fats and oils  Soft margarine without trans fats. Vegetable oil. Low-fat, reduced-fat, or light mayonnaise and salad dressings (reduced-sodium). Canola, safflower, olive, soybean, and sunflower oils. Avocado.  Seasoning and other foods  Herbs. Spices. Seasoning mixes without salt. Unsalted popcorn and pretzels. Fat-free sweets.  What foods are not recommended?  The items listed may not be a complete list. Talk with your dietitian about what dietary choices are best for you.  Grains  Baked goods made with fat, such as croissants, muffins, or some breads. Dry pasta or rice meal packs.  Vegetables  Creamed or fried vegetables. Vegetables in a cheese sauce. Regular canned vegetables (not low-sodium or reduced-sodium). Regular canned tomato sauce and paste (not low-sodium or reduced-sodium). Regular tomato and vegetable juice (not low-sodium or reduced-sodium). Pickles. Olives.  Fruits  Canned fruit in a light or heavy syrup. Fried fruit. Fruit in cream or butter sauce.  Meat and other protein foods  Fatty cuts of meat. Ribs. Fried meat. Chow. Sausage. Bologna and other processed lunch meats. Salami. Fatback. Hotdogs. Bratwurst. Salted nuts and seeds. Canned beans with added salt. Canned or smoked fish. Whole eggs or egg yolks. Chicken or turkey with skin.  Dairy  Whole or 2% milk, cream, and half-and-half. Whole or full-fat cream cheese. Whole-fat or sweetened yogurt. Full-fat cheese. Nondairy creamers. Whipped toppings.  Processed cheese and cheese spreads.  Fats and oils  Butter. Stick margarine. Lard. Shortening. Ghee. Chow fat. Tropical oils, such as coconut, palm kernel, or palm oil.  Seasoning and other foods  Salted popcorn and pretzels. Onion salt, garlic salt, seasoned salt, table salt, and sea salt. Worcestershire sauce. Tartar sauce. Barbecue sauce. Teriyaki sauce. Soy sauce, including reduced-sodium. Steak sauce. Canned and packaged gravies. Fish sauce. Oyster sauce. Cocktail sauce. Horseradish that you find on the shelf. Ketchup. Mustard. Meat flavorings and tenderizers. Bouillon cubes. Hot sauce and Tabasco sauce. Premade or packaged marinades. Premade or packaged taco seasonings. Relishes. Regular salad dressings.  Where to find more information:  · National Heart, Lung, and [employer] [place]: www.nhlbi.nih.gov  · American Heart Association: www.heart.org  Summary  · The DASH eating plan is a healthy eating plan that has been shown to reduce high blood pressure (hypertension). It may also reduce your risk for type 2 diabetes, heart disease, and stroke.  · With the DASH eating plan, you should limit salt (sodium) intake to 2,300 mg a day. If you have hypertension, you may need to reduce your sodium intake to 1,500 mg a day.  · When on the DASH eating plan, aim to eat more fresh fruits and vegetables, whole grains, lean proteins, low-fat dairy, and heart-healthy fats.  · Work with your health care provider or diet and nutrition specialist (dietitian) to adjust your eating plan to your individual calorie needs.  This information is not intended to replace advice given to you by your health care provider. Make sure you discuss any questions you have with your health care provider.  Document Released: 12/06/2012 Document Revised: 12/11/2017 Document Reviewed: 12/11/2017  [employer] Interactive Patient Education © 2019 [employer]

## 2019-10-11 ENCOUNTER — TREATMENT (OUTPATIENT)
Dept: PHYSICAL THERAPY | Facility: CLINIC | Age: 49
End: 2019-10-11

## 2019-10-11 DIAGNOSIS — G89.29 CHRONIC BILATERAL LOW BACK PAIN WITH BILATERAL SCIATICA: Primary | ICD-10-CM

## 2019-10-11 DIAGNOSIS — M54.41 CHRONIC BILATERAL LOW BACK PAIN WITH BILATERAL SCIATICA: Primary | ICD-10-CM

## 2019-10-11 DIAGNOSIS — M54.42 CHRONIC BILATERAL LOW BACK PAIN WITH BILATERAL SCIATICA: Primary | ICD-10-CM

## 2019-10-11 PROCEDURE — 97162 PT EVAL MOD COMPLEX 30 MIN: CPT | Performed by: PHYSICAL THERAPIST

## 2019-10-11 NOTE — PROGRESS NOTES
Outpatient Physical Therapy Ortho Initial Evaluation       Patient Name: Randell Reddy  : 1970  MRN: [id number]  Today's Date: 10/11/2019      Visit Date: 10/11/2019    Patient Active Problem List   Diagnosis   • Obesity due to excess calories   • Lumbar pain   • Pain in both lower extremities   • Numbness and tingling of both legs        Past Medical History:   Diagnosis Date   • Injury of back    • Kidney stone         Past Surgical History:   Procedure Laterality Date   • BACK SURGERY     • EXTRACORPOREAL SHOCK WAVE LITHOTRIPSY (ESWL)         Visit Dx:     ICD-10-CM ICD-9-CM   1. Chronic bilateral low back pain with bilateral sciatica M54.42 724.2    M54.41 724.3    G89.29 338.29         Patient History     Row Name 10/11/19 1500             History    Chief Complaint  Pain;Impaired sensation;Joint stiffness;Muscle tenderness;Muscle weakness;Numbness  -TB (r) MW (t) TB (c)      Type of Pain  Back pain;Lower Extremity / Leg  -TB (r) MW (t) TB (c)      Date Current Problem(s) Began  -- 6 mo ago  -TB (r) MW (t) TB (c)      Brief Description of Current Complaint  Pt c/o back pain that radiates down to BLEs. He said that it started 6 mo ago. He has had 2 sx on his back one was a laminectomy/discectomy on L3/4 and L4/5.  -TB (r) MW (t) TB (c)      Previous treatment for THIS PROBLEM  Injections;Rehabilitation;Surgery  -TB (r) MW (t) TB (c)      Surgery Date:  --  and   -TB (r) MW (t) TB (c)      Patient/Caregiver Goals  Relieve pain;Return to prior level of function;Improve mobility;Improve strength;Know what to do to help the symptoms  -TB (r) MW (t) TB (c)      Occupation/sports/leisure activities  [employer] as an   -TB (r) MW (t) TB (c)      What clinical tests have you had for this problem?  X-ray  -TB (r) MW (t) TB (c)      Results of Clinical Tests  DDD at L4/5 and L5/S1 w/ facet atrophy L4/5 and L5/S1; lumbar stenosis; retrolisthesis of L4 on L5.  -TB (r) MW (t) TB (c)         Pain      Pain Location  Back;Leg  -TB (r) MW (t) TB (c)      Pain at Present  3  -TB (r) MW (t) TB (c)      Pain at Best  0  -TB (r) MW (t) TB (c)      Pain at Worst  8  -TB (r) MW (t) TB (c)      Pain Frequency  Intermittent  -TB (r) MW (t) TB (c)      Pain Description  Aching;Dull;Pins and needles;Numbness;Sharp;Shooting  -TB (r) MW (t) TB (c)      What Performance Factors Make the Current Problem(s) WORSE?  sitting, restaurant chairs  -TB (r) MW (t) TB (c)      What Performance Factors Make the Current Problem(s) BETTER?  walking, moving around  -TB (r) MW (t) TB (c)      Is your sleep disturbed?  No  -TB (r) MW (t) TB (c)         Fall Risk Assessment    Any falls in the past year:  No  -TB (r) MW (t) TB (c)        User Key  (r) = Recorded By, (t) = Taken By, (c) = Cosigned By    Initials Name Provider Type    TB Ruiz Mcclelland, PT Physical Therapist    MW Tawny Blanco, PT Student PT Student          PT Ortho     Row Name 10/11/19 1500       Posture/Observations    Posture/Observations Comments  forward head/shoulder, increased thoracic kyphosis, decreased lumbar lordosis  -TB (r) MW (t) TB (c)       Quarter Clearing    Quarter Clearing  Lower Quarter Clearing  -TB (r) MW (t) TB (c)       DTR- Lower Quarter Clearing    Patellar tendon (L2-4)  Bilateral:;2- Normal response  -TB (r) MW (t) TB (c)    Achilles tendon (S1-2)  Right:;2- Normal response;Left:;0- No response  -TB (r) MW (t) TB (c)       Neural Tension Signs- Lower Quarter Clearing    SLR  Negative  -TB (r) MW (t) TB (c)       Sensory Screen for Light Touch- Lower Quarter Clearing    L1 (inguinal area)  Intact  -TB (r) MW (t) TB (c)    L2 (anterior mid thigh)  Intact  -TB (r) MW (t) TB (c)    L3 (distal anterior thigh)  Intact  -TB (r) MW (t) TB (c)    L4 (medial lower leg/foot)  Intact  -TB (r) MW (t) TB (c)    L5 (lateral lower leg/great toe)  Intact  -TB (r) MW (t) TB (c)    S1 (bottom of foot)  Intact  -TB (r) MW (t) TB (c)       Myotomal Screen- Lower  Quarter Clearing    Hip flexion (L2)  4 (Good)  -TB (r) MW (t) TB (c)    Knee extension (L3)  4 (Good)  -TB (r) MW (t) TB (c)    Ankle DF (L4)  4 (Good)  -TB (r) MW (t) TB (c)    Great toe extension (L5)  4 (Good)  -TB (r) MW (t) TB (c)    Ankle PF (S1)  4 (Good)  -TB (r) MW (t) TB (c)    Knee flexion (S2)  4 (Good)  -TB (r) MW (t) TB (c)       Lumbar ROM Screen- Lower Quarter Clearing    Lumbar Flexion  Impaired  -TB (r) MW (t) TB (c)    Lumbar Extension  Impaired  -TB (r) MW (t) TB (c)    Lumbar Lateral Flexion  Impaired  -TB (r) MW (t) TB (c)    Lumbar Rotation  Impaired  -TB (r) MW (t) TB (c)    Lumbar Quadrant   Impaired  -TB (r) MW (t) TB (c)       SI/Hip Screen- Lower Quarter Clearing    ASIS compression  Negative  -TB (r) MW (t) TB (c)    ASIS distraction  Negative  -TB (r) MW (t) TB (c)    Gino's/Luiz's test  Right:;Positive  -TB (r) MW (t) TB (c)       Special Tests/Palpation    Special Tests/Palpation  Lumbar/SI;Cervical/Thoracic;Hip  -TB (r) MW (t) TB (c)       Thoracic Accessory Motions    Thoracic Accessory Motions Tested?  Yes  -TB (r) MW (t) TB (c)    Pa glide- Upper thoracic  Hypomobile  -TB (r) MW (t) TB (c)    Pa glide- Middle thoracic  Hypomobile  -TB (r) MW (t) TB (c)    Pa glide- Lower thoracic  Hypomobile  -TB (r) MW (t) TB (c)    PA glide- T1  Hypomobile  -TB (r) MW (t) TB (c)    PA glide- T2  Hypomobile  -TB (r) MW (t) TB (c)    PA glide- T3  Hypomobile  -TB (r) MW (t) TB (c)    PA glide- T4  Hypomobile  -TB (r) MW (t) TB (c)    PA glide- T5  Hypomobile  -TB (r) MW (t) TB (c)    PA glide- T6  Hypomobile  -TB (r) MW (t) TB (c)    PA glide- T7  Hypomobile  -TB (r) MW (t) TB (c)    PA glide- T8  Hypomobile  -TB (r) MW (t) TB (c)    PA glide- T9  Hypomobile  -TB (r) MW (t) TB (c)    PA glide- T10  Hypomobile  -TB (r) MW (t) TB (c)    PA glide- T11  Hypomobile  -TB (r) MW (t) TB (c)    PA glide- T12  Hypomobile  -TB (r) MW (t) TB (c)       Lumbosacral Accessory Motions    Lumbosacral  Accessory Motions Tested?  Yes  -TB (r) MW (t) TB (c)    PA Glide- L1  Hypomobile  -TB (r) MW (t) TB (c)    PA Glide- L2  Hypomobile  -TB (r) MW (t) TB (c)    PA Glide- L3  Hypomobile  -TB (r) MW (t) TB (c)    PA Glide- L5  Hypomobile  -TB (r) MW (t) TB (c)    PA glide- Sacral base  Hypomobile  -TB (r) MW (t) TB (c)    PA glide- Sacral apex  Hypomobile  -TB (r) MW (t) TB (c)       Lumbar/SI Special Tests    Standing Flexion Test (SI Dysfunction)  Negative  -TB (r) MW (t) TB (c)    Trendelenburg Test (Gluteus Medius Weakness)  Bilateral:;Positive  -TB (r) MW (t) TB (c)    Seated Flexion Test (SI Dysfunction)  Negative  -TB (r) MW (t) TB (c)    Sacral Spring Test (SI Dysfunction)  Negative  -TB (r) MW (t) TB (c)       Lumbosacral Palpation    Lumbosacral Palpation?  Yes  -TB (r) MW (t) TB (c)    Piriformis  Bilateral:;Tender;Guarded/taut  -TB (r) MW (t) TB (c)    Quadratus Lumborum  Bilateral:;Tender;Guarded/taut  -TB (r) MW (t) TB (c)    Iliopsoas  Bilateral:;Tender;Guarded/taut  -TB (r) MW (t) TB (c)       Hip Special Tests    Aakash test (tightness of ITB)  Bilateral:;Positive  -TB (r) MW (t) TB (c)       General ROM    Head/Neck/Trunk  Trunk Extension;Trunk Flexion;Trunk Lt Lateral Flexion;Trunk Rt Lateral Flexion;Trunk Lt Rotation;Trunk Rt Rotation  -TB (r) MW (t) TB (c)    RT Lower Ext  Rt Hip ABduction;Rt Hip Extension;Rt Hip External Rotation;Rt Hip Internal Rotation;Rt Hip Flexion;Rt Knee Extension/Flexion;Rt Ankle Dorsiflexion;Rt Ankle Plantarflexion  -TB (r) MW (t) TB (c)    LT Lower Ext  Lt Hip ABduction;Lt Hip Extension;Lt Hip Flexion;Lt Hip External Rotation;Lt Hip Internal Rotation;Lt Knee Extension/Flexion;Lt Ankle Dorsiflexion;Lt Ankle Plantarflexion  -TB (r) MW (t) TB (c)       Head/Neck/Trunk    Trunk Extension AROM  25% w/ increase in radicular symptoms  -TB (r) MW (t) TB (c)    Trunk Flexion AROM  50%  -TB (r) MW (t) TB (c)    Trunk Lt Lateral Flexion AROM  50%  -TB (r) MW (t) TB (c)    Trunk Rt  Lateral Flexion AROM  50%  -TB (r) MW (t) TB (c)    Trunk Lt Rotation AROM  50%  -TB (r) MW (t) TB (c)    Trunk Rt Rotation AROM  50%  -TB (r) MW (t) TB (c)       Right Lower Ext    Rt Hip ABduction AROM  WFL  -TB (r) MW (t) TB (c)    Rt Hip Extension PROM  0 w/ increased lumbar lordosis  -TB (r) MW (t) TB (c)    Rt Hip Flexion AROM  WFL  -TB (r) MW (t) TB (c)    Rt Hip External Rotation PROM  WFL  -TB (r) MW (t) TB (c)    Rt Hip Internal Rotation PROM  5 degrees  -TB (r) MW (t) TB (c)    Rt Knee Extension/Flexion AROM  WFL  -TB (r) MW (t) TB (c)    Rt Ankle Dorsiflexion AROM  WFL  -TB (r) MW (t) TB (c)    Rt Ankle Plantarflexion AROM  WFL  -TB (r) MW (t) TB (c)       Left Lower Ext    Lt Hip ABduction AROM  WFL  -TB (r) MW (t) TB (c)    Lt Hip Extension PROM  0 w/ increased lumbar lordosis  -TB (r) MW (t) TB (c)    Lt Hip Flexion AROM  WFL  -TB (r) MW (t) TB (c)    Lt Hip External Rotation PROM  WFL  -TB (r) MW (t) TB (c)    Lt Hip Internal Rotation PROM  20 degrees  -TB (r) MW (t) TB (c)    Lt Knee Extension/Flexion AROM  WFL  -TB (r) MW (t) TB (c)    Lt Ankle Dorsiflexion AROM  WFL  -TB (r) MW (t) TB (c)    Lt Ankle Plantarflexion AROM  WFL  -TB (r) MW (t) TB (c)       MMT (Manual Muscle Testing)    Rt Lower Ext  Rt Hip ABduction  -TB (r) MW (t) TB (c)    Lt Lower Ext  Lt Hip ABduction  -TB (r) MW (t) TB (c)       MMT Right Lower Ext    Rt Hip ABduction MMT, Gross Movement  (4/5) good  -TB (r) MW (t) TB (c)       MMT Left Lower Ext    Lt Hip ABduction MMT, Gross Movement  (4/5) good  -TB (r) MW (t) TB (c)      User Key  (r) = Recorded By, (t) = Taken By, (c) = Cosigned By    Initials Name Provider Type    TB Ruiz Mcclelland, PT Physical Therapist    Tawny Rebollar, PT Student PT Student                      Therapy Education  Education Details: pelvic  tucks and standing hip ABD  Given: HEP, Symptoms/condition management, Posture/body mechanics  Program: New  How Provided: Verbal, Demonstration,  Written  Provided to: Patient  Level of Understanding: Verbalized, Demonstrated     PT OP Goals     Row Name 10/11/19 1600          PT Short Term Goals    STG Date to Achieve  11/01/19  -TB (r) MW (t) TB (c)     STG 1  Pt to improve thoracic mobility.  -TB (r) MW (t) TB (c)     STG 1 Progress  New  -TB (r) MW (t) TB (c)     STG 2  Pt to improve B hip PROM to 10 degrees w/o increased lumbar lordosis.  -TB (r) MW (t) TB (c)     STG 2 Progress  New  -TB (r) MW (t) TB (c)     STG 3  Pt to improve R hip PROM IR to symmetrical w/ L.  -TB (r) MW (t) TB (c)     STG 3 Progress  New  -TB (r) MW (t) TB (c)     STG 4  Pt to improve lumbar AROM to 75% w/o radicular symptoms.  -TB (r) MW (t) TB (c)     STG 4 Progress  New  -TB (r) MW (t) TB (c)        Long Term Goals    LTG Date to Achieve  11/22/19  -TB (r) MW (t) TB (c)     LTG 1  Independent w/ HEP.  -TB (r) MW (t) TB (c)     LTG 1 Progress  New  -TB (r) MW (t) TB (c)     LTG 2  Pt to B SLS x10 sec w/ hip stability.  -TB (r) MW (t) TB (c)     LTG 2 Progress  New  -TB (r) MW (t) TB (c)     LTG 3  Pt to improve ADÁN to <10%.  -TB (r) MW (t) TB (c)     LTG 3 Progress  New  -TB (r) MW (t) TB (c)     LTG 4  Pt to report pain no higher than 4/10 x1 week.  -TB (r) MW (t) TB (c)     LTG 4 Progress  New  -TB (r) MW (t) TB (c)        Time Calculation    PT Goal Re-Cert Due Date  11/10/19  -TB (r) MW (t) TB (c)       User Key  (r) = Recorded By, (t) = Taken By, (c) = Cosigned By    Initials Name Provider Type    TB Ruiz Mcclelland, PT Physical Therapist    Tawny Rebollar, PT Student PT Student          PT Assessment/Plan     Row Name 10/11/19 1600          PT Assessment    Functional Limitations  Limitations in functional capacity and performance  -TB (r) MW (t) TB (c)     Impairments  Joint integrity;Joint mobility;Motor function;Muscle strength;Pain;Poor body mechanics;Posture;Range of motion;Sensation  -TB (r) MW (t) TB (c)     Assessment Comments  Pt presents today w/ signs and  symptoms consistent w/ the findings of his lumbar x-ray. He has had several sx to treat this issue, but it has been persistent over the years. Pt's symptoms were aggravated with extension and combined extension and side bending movements. He also presented w/ decreased hip ROM on both sides w/ R>L. His hip abductors were not weak in the standard testing position for MMT, but he did display a Trendelenberg in standing and seemed to struggle w/ more functional activities. He also has decreased lumbar lordosis and increased thoracic kyphosis. Along with these issues, he also had tight B piriformis that aggravated his radicular symptoms. I feel that he would benefit from skilled PT at this time.  -TB (r) MW (t) TB (c)     Please refer to paper survey for additional self-reported information  Yes  -TB (r) MW (t) TB (c)     Rehab Potential  Good  -TB (r) MW (t) TB (c)     Patient/caregiver participated in establishment of treatment plan and goals  Yes  -TB (r) MW (t) TB (c)     Patient would benefit from skilled therapy intervention  Yes  -TB (r) MW (t) TB (c)        PT Plan    PT Frequency  2x/week;3x/week  -TB (r) MW (t) TB (c)     Predicted Duration of Therapy Intervention (Therapy Eval)  6 weeks  -TB (r) MW (t) TB (c)     Planned CPT's?  PT THER PROC EA 15 MIN: 26588;PT THER ACT EA 15 MIN: 86912;PT MANUAL THERAPY EA 15 MIN: 93000;PT NEUROMUSC RE-EDUCATION EA 15 MIN: 04445;PT GAIT TRAINING EA 15 MIN: 95965;PT SELF CARE/HOME MGMT/TRAIN EA 15: 08813;PT ELECTRICAL STIM UNATTEND: ;PT ELECTRICAL STIM ATTD EA 15 MIN: 35686;PT ULTRASOUND EA 15 MIN: 87133;PT TRACTION LUMBAR: 42973;PT HOT/COLD PACK WC NONMCARE: 94696;PT EVAL MOD COMPLELITY: 08693  -TB (r) MW (t) TB (c)     PT Plan Comments  Begin with mobilization of the spine and STM for the tightened muscles as well as flexibility. Add progression of hip/core stability slowly so as not to aggravate his symptoms.  -TB (r) MW (t) TB (c)       User Key  (r) = Recorded By,  (t) = Taken By, (c) = Cosigned By    Initials Name Provider Type    TB Ruiz Mcclelland, PT Physical Therapist    Tawny Rebollar, PT Student PT Student                              Outcome Measure Options: Modifed Owestry  Modified Oswestry  Modified Oswestry Score/Comments: 28%      Time Calculation:               PT G-Codes  Outcome Measure Options: Modifed Owestry  Modified Oswestry Score/Comments: 28%         Ruiz Mcclelland, PT  10/11/2019

## 2019-10-15 ENCOUNTER — HOSPITAL ENCOUNTER (OUTPATIENT)
Dept: NEUROLOGY | Facility: HOSPITAL | Age: 49
Discharge: HOME | End: 2019-10-15
Admitting: NURSE PRACTITIONER

## 2019-10-15 DIAGNOSIS — R20.2 NUMBNESS AND TINGLING OF BOTH LEGS: ICD-10-CM

## 2019-10-15 DIAGNOSIS — R20.0 NUMBNESS AND TINGLING OF BOTH LEGS: ICD-10-CM

## 2019-10-15 PROCEDURE — 95886 MUSC TEST DONE W/N TEST COMP: CPT

## 2019-10-15 PROCEDURE — 95911 NRV CNDJ TEST 9-10 STUDIES: CPT

## 2019-10-17 ENCOUNTER — TREATMENT (OUTPATIENT)
Dept: PHYSICAL THERAPY | Facility: CLINIC | Age: 49
End: 2019-10-17

## 2019-10-17 DIAGNOSIS — M54.41 CHRONIC BILATERAL LOW BACK PAIN WITH BILATERAL SCIATICA: Primary | ICD-10-CM

## 2019-10-17 DIAGNOSIS — G89.29 CHRONIC BILATERAL LOW BACK PAIN WITH BILATERAL SCIATICA: Primary | ICD-10-CM

## 2019-10-17 DIAGNOSIS — M54.42 CHRONIC BILATERAL LOW BACK PAIN WITH BILATERAL SCIATICA: Primary | ICD-10-CM

## 2019-10-17 PROCEDURE — 97110 THERAPEUTIC EXERCISES: CPT | Performed by: PHYSICAL THERAPIST

## 2019-10-17 PROCEDURE — 97140 MANUAL THERAPY 1/> REGIONS: CPT | Performed by: PHYSICAL THERAPIST

## 2019-10-17 NOTE — PROGRESS NOTES
Outpatient Physical Therapy Ortho Treatment Note       Patient Name: Randell Reddy  : 1970  MRN: [id number]  Today's Date: 10/17/2019      Visit Date: 10/17/2019    Visit Dx:    ICD-10-CM ICD-9-CM   1. Chronic bilateral low back pain with bilateral sciatica M54.42 724.2    M54.41 724.3    G89.29 338.29       Patient Active Problem List   Diagnosis   • Obesity due to excess calories   • Lumbar pain   • Pain in both lower extremities   • Numbness and tingling of both legs        Past Medical History:   Diagnosis Date   • Injury of back    • Kidney stone         Past Surgical History:   Procedure Laterality Date   • BACK SURGERY     • EXTRACORPOREAL SHOCK WAVE LITHOTRIPSY (ESWL)                         PT Assessment/Plan     Row Name 10/17/19 170          PT Assessment    Assessment Comments  today our emphasis was on flexibility. When he got up, he had no radicular symptoms but when he shifted his weight he had a brief moment of some.   -TB        PT Plan    PT Plan Comments  Cont emphasis on flexibility through his hips and thoracic spine to protect his lumbar. Avoid extension of his lumbar.  -TB       User Key  (r) = Recorded By, (t) = Taken By, (c) = Cosigned By    Initials Name Provider Type    TB Ruiz Mcclelland, PT Physical Therapist            OP Exercises     Row Name 10/17/19 170             Subjective Comments    Subjective Comments  He reports he has been doing OK since his eval. He still has radicular pain at certain times or with certain movements into his lower legs.   -TB         Subjective Pain    Pre-Treatment Pain Level  2  -TB         Total Minutes    75380 - PT Therapeutic Exercise Minutes  10  -TB      47188 - PT Manual Therapy Minutes  35  -TB         Exercise 1    Exercise Name 1  passive geovany piriformis stretch alternating left and right; passive hamstring stretch  -TB        User Key  (r) = Recorded By, (t) = Taken By, (c) = Cosigned By    Initials Name Provider Type    TB  Ruiz Mcclelland, PT Physical Therapist                      Manual Rx (last 36 hours)      Manual Treatments     Row Name 10/17/19 1700             Total Minutes    46927 - PT Manual Therapy Minutes  35  -TB         Manual Rx 1    Manual Rx 1 Location  prone thoracic extension  -TB      Manual Rx 1 Type  manual mob  -TB      Manual Rx 1 Grade  repetitive wtih strong OP  -TB         Manual Rx 2    Manual Rx 2 Location  prone geovany sacral CRESENCIO  -TB      Manual Rx 2 Type  alternating egovany PA with strong OP  -TB         Manual Rx 3    Manual Rx 3 Location  assessed for any guarding through IP or OI; he had none  -TB         Manual Rx 4    Manual Rx 4 Location  sacral float with wedge  -TB      Manual Rx 4 Type  hips to 90 with posterior pressure through legs flexed to 90  -TB        User Key  (r) = Recorded By, (t) = Taken By, (c) = Cosigned By    Initials Name Provider Type    TB Ruiz Mcclelland, PT Physical Therapist          PT OP Goals     Row Name 10/17/19 1700          PT Short Term Goals    STG Date to Achieve  11/01/19  -TB     STG 1  Pt to improve thoracic mobility.  -TB     STG 1 Progress  Ongoing  -TB     STG 1 Progress Comments  stiff in kyphosis  -TB     STG 2  Pt to improve B hip PROM to 10 degrees w/o increased lumbar lordosis.  -TB     STG 2 Progress  Ongoing  -TB     STG 3  Pt to improve R hip PROM IR to symmetrical w/ L.  -TB     STG 3 Progress  Ongoing  -TB     STG 4  Pt to improve lumbar AROM to 75% w/o radicular symptoms.  -TB     STG 4 Progress  Ongoing  -TB        Long Term Goals    LTG Date to Achieve  11/22/19  -TB     LTG 1  Independent w/ HEP.  -TB     LTG 1 Progress  Ongoing  -TB     LTG 1 Progress Comments  added stretches today  -TB     LTG 2  Pt to B SLS x10 sec w/ hip stability.  -TB     LTG 2 Progress  New  -TB     LTG 3  Pt to improve ADÁN to <10%.  -TB     LTG 3 Progress  New  -TB     LTG 4  Pt to report pain no higher than 4/10 x1 week.  -TB     LTG 4 Progress  New  -TB        Time  Calculation    PT Goal Re-Cert Due Date  11/10/19  -TB       User Key  (r) = Recorded By, (t) = Taken By, (c) = Cosigned By    Initials Name Provider Type    TB Ruiz Mcclelland, PT Physical Therapist          Therapy Education  Education Details: hooklying piriformis; standing wall thoracic extension stretch; standing lunge IP lunge stretch              Time Calculation:                    Ruiz Mcclelland, PT  10/17/2019

## 2019-10-22 ENCOUNTER — TREATMENT (OUTPATIENT)
Dept: PHYSICAL THERAPY | Facility: CLINIC | Age: 49
End: 2019-10-22

## 2019-10-22 DIAGNOSIS — G89.29 CHRONIC BILATERAL LOW BACK PAIN WITH BILATERAL SCIATICA: Primary | ICD-10-CM

## 2019-10-22 DIAGNOSIS — M54.41 CHRONIC BILATERAL LOW BACK PAIN WITH BILATERAL SCIATICA: Primary | ICD-10-CM

## 2019-10-22 DIAGNOSIS — M54.42 CHRONIC BILATERAL LOW BACK PAIN WITH BILATERAL SCIATICA: Primary | ICD-10-CM

## 2019-10-22 PROCEDURE — 97110 THERAPEUTIC EXERCISES: CPT | Performed by: PHYSICAL THERAPIST

## 2019-10-22 PROCEDURE — 97140 MANUAL THERAPY 1/> REGIONS: CPT | Performed by: PHYSICAL THERAPIST

## 2019-10-22 NOTE — PROGRESS NOTES
Outpatient Physical Therapy Ortho Treatment Note       Patient Name: Randell Reddy  : 1970  MRN: [id number]  Today's Date: 10/22/2019      Visit Date: 10/22/2019    Visit Dx:    ICD-10-CM ICD-9-CM   1. Chronic bilateral low back pain with bilateral sciatica M54.42 724.2    M54.41 724.3    G89.29 338.29       Patient Active Problem List   Diagnosis   • Obesity due to excess calories   • Lumbar pain   • Pain in both lower extremities   • Numbness and tingling of both legs        Past Medical History:   Diagnosis Date   • Injury of back    • Kidney stone         Past Surgical History:   Procedure Laterality Date   • BACK SURGERY     • EXTRACORPOREAL SHOCK WAVE LITHOTRIPSY (ESWL)                         PT Assessment/Plan     Row Name 10/22/19 1544          PT Assessment    Assessment Comments  He did not have radicular pain in legs today, which is a positive finding.  He does continues to have tightness in his thoracic spine, which contributes to an unwanted load being placed on lumbar spine.  He was progressed with gentle core stabilization exercises today without an increase of symptoms.   -RADHA        PT Plan    PT Plan Comments  Continue to promote hip flexibility and gradually progress core stabilization.   -RADHA       User Key  (r) = Recorded By, (t) = Taken By, (c) = Cosigned By    Initials Name Provider Type    Tesfaye Montanez, PTA Physical Therapy Assistant            OP Exercises     Row Name 10/22/19 1544 10/22/19 1500          Subjective Comments    Subjective Comments  Patient reports that is back is sore today.  He had a kidney stone  morning, and this causes his back to be sore. He denies any pain into the legs today.       -RADHA  --        Subjective Pain    Able to rate subjective pain?  yes  -RADHA  --     Pre-Treatment Pain Level  4  -RADHA  --     Post-Treatment Pain Level  4  -RADHA  --        Total Minutes    48040 - PT Therapeutic Exercise Minutes  31  -RADHA  --     09185 - PT Manual  Therapy Minutes  --  15  -RADHA        Exercise 1    Exercise Name 1  passive geovany piriformis stretch alternating left and right; passive hamstring stretch  -RADHA  --     Cueing 1  Verbal;Tactile  -RADHA  --        Exercise 2    Exercise Name 2  passive IR hip stretches   -RADHA  --     Cueing 2  Verbal;Tactile  -RADHA  --        Exercise 3    Exercise Name 3  hooklying PPT   -RADHA  --     Cueing 3  Verbal;Demo  -RADHA  --     Sets 3  1  -RADHA  --     Reps 3  10  -RADHA  --     Time 3  5 second holds   -RADHA  --        Exercise 4    Exercise Name 4  hooklying BKFO   -RADHA  --     Cueing 4  Verbal;Demo  -RADHA  --     Sets 4  2  -RADHA  --     Reps 4  10  -RADHA  --     Additional Comments  green band   -RADHA  --       User Key  (r) = Recorded By, (t) = Taken By, (c) = Cosigned By    Initials Name Provider Type    Tesfaye Montanez, ECTOR Physical Therapy Assistant                      Manual Rx (last 36 hours)      Manual Treatments     Row Name 10/22/19 1500             Total Minutes    05790 - PT Manual Therapy Minutes  15  -RADHA         Manual Rx 1    Manual Rx 1 Location  prone with pillow: lumbar parapinals and (R) glute   -RADHA      Manual Rx 1 Type  IASTM with blude ridged foam roller   -RDAHA      Manual Rx 1 Duration  10  -RADHA         Manual Rx 2    Manual Rx 2 Location  prone thoracic extension  -RADHA      Manual Rx 2 Type  manual mob  -RADHA      Manual Rx 2 Grade  2-3  -RADHA      Manual Rx 2 Duration  5  -RADHA        User Key  (r) = Recorded By, (t) = Taken By, (c) = Cosigned By    Initials Name Provider Type    Tesfaye Montanez PTA Physical Therapy Assistant          PT OP Goals     Row Name 10/22/19 1544          PT Short Term Goals    STG Date to Achieve  11/01/19  -RADHA     STG 1  Pt to improve thoracic mobility.  -RADHA     STG 1 Progress  Ongoing  -RADHA     STG 1 Progress Comments  he continues to have stiffness   -RADHA     STG 2  Pt to improve B hip PROM to 10 degrees w/o increased lumbar lordosis.  -RADHA     STG 2 Progress  Ongoing  -RADHA     STG 3  Pt to  improve R hip PROM IR to symmetrical w/ L.  -RADHA     STG 3 Progress  Ongoing  -RADHA     STG 4  Pt to improve lumbar AROM to 75% w/o radicular symptoms.  -RADHA     STG 4 Progress  Ongoing  -RADHA        Long Term Goals    LTG Date to Achieve  11/22/19  -RADHA     LTG 1  Independent w/ HEP.  -RADHA     LTG 1 Progress  Ongoing  -RADHA     LTG 2  Pt to B SLS x10 sec w/ hip stability.  -RADHA     LTG 2 Progress  Ongoing  -RADHA     LTG 3  Pt to improve ADÁN to <10%.  -RADHA     LTG 3 Progress  Ongoing  -RADHA     LTG 4  Pt to report pain no higher than 4/10 x1 week.  -RADHA     LTG 4 Progress  Ongoing  -RADHA        Time Calculation    PT Goal Re-Cert Due Date  11/10/19  -RADHA       User Key  (r) = Recorded By, (t) = Taken By, (c) = Cosigned By    Initials Name Provider Type    Tesfaye Montanez, PTA Physical Therapy Assistant          Therapy Education  Given: HEP, Symptoms/condition management  Program: Reinforced  How Provided: Verbal  Provided to: Patient  Level of Understanding: Verbalized              Time Calculation:   Total Timed Code Minutes- PT: 46 minute(s)                Tesfaye Macias PTA  10/22/2019

## 2019-10-24 ENCOUNTER — TREATMENT (OUTPATIENT)
Dept: PHYSICAL THERAPY | Facility: CLINIC | Age: 49
End: 2019-10-24

## 2019-10-24 DIAGNOSIS — G89.29 CHRONIC BILATERAL LOW BACK PAIN WITH BILATERAL SCIATICA: Primary | ICD-10-CM

## 2019-10-24 DIAGNOSIS — M54.42 CHRONIC BILATERAL LOW BACK PAIN WITH BILATERAL SCIATICA: Primary | ICD-10-CM

## 2019-10-24 DIAGNOSIS — M54.41 CHRONIC BILATERAL LOW BACK PAIN WITH BILATERAL SCIATICA: Primary | ICD-10-CM

## 2019-10-24 PROCEDURE — 97110 THERAPEUTIC EXERCISES: CPT | Performed by: PHYSICAL THERAPIST

## 2019-10-24 PROCEDURE — 97140 MANUAL THERAPY 1/> REGIONS: CPT | Performed by: PHYSICAL THERAPIST

## 2019-10-24 PROCEDURE — 97530 THERAPEUTIC ACTIVITIES: CPT | Performed by: PHYSICAL THERAPIST

## 2019-10-24 NOTE — PROGRESS NOTES
Outpatient Physical Therapy Ortho Treatment Note       Patient Name: Randell Reddy  : 1970  MRN: [id number]  Today's Date: 10/24/2019      Visit Date: 10/24/2019    Visit Dx:    ICD-10-CM ICD-9-CM   1. Chronic bilateral low back pain with bilateral sciatica M54.42 724.2    M54.41 724.3    G89.29 338.29       Patient Active Problem List   Diagnosis   • Obesity due to excess calories   • Lumbar pain   • Pain in both lower extremities   • Numbness and tingling of both legs        Past Medical History:   Diagnosis Date   • Injury of back    • Kidney stone         Past Surgical History:   Procedure Laterality Date   • BACK SURGERY     • EXTRACORPOREAL SHOCK WAVE LITHOTRIPSY (ESWL)                         PT Assessment/Plan     Row Name 10/24/19 4259          PT Assessment    Assessment Comments  Patient continues to have the same amount of back soreness, but he did not present with leg radicular pain.  He continues to ambulate with a limp partly due to a past leg injury and atrophied left calf muscles.  However, this does impact mechanics in his lumbar spine, and therefore was emphasized today.  He did ambulate out with equal weightbearing during midstance, and this will be a focus moving forward.   -RADHA        PT Plan    PT Plan Comments  Assess his gait next session and continue to emphasize improved midstance with equal weightbearing and improved hip stability.  Continue to promote improved hip mobility with progression into strengthening.   -RADHA       User Key  (r) = Recorded By, (t) = Taken By, (c) = Cosigned By    Initials Name Provider Type    Tesfaye Montanez PTA Physical Therapy Assistant            OP Exercises     Row Name 10/24/19 5139             Subjective Comments    Subjective Comments  Patient  reports he is about the same.  He reports his back is still pretty sore.  He reports work does contribute to his back soreness.  He deneis any symptoms into the legs.    -RADHA         Subjective  Pain    Able to rate subjective pain?  yes  -RADHA      Pre-Treatment Pain Level  5  -RADHA      Post-Treatment Pain Level  5  -RADHA         Total Minutes    25390 - PT Therapeutic Exercise Minutes  21  -RADHA      23092 - PT Therapeutic Activity Minutes  8  -RADHA      01413 - PT Manual Therapy Minutes  16  -RADHA         Exercise 1    Exercise Name 1  passive geovany piriformis, IR,ER stretches   -RADHA      Cueing 1  Verbal;Demo  -RADHA      Time 1  10 minutes   -RADHA      Additional Comments  did not stretch right into ER due WFL   -RADHA         Exercise 2    Exercise Name 2  hooklying PPT   -RADHA      Cueing 2  Verbal;Demo  -RADHA      Sets 2  1  -RADHA      Reps 2  10  -RADHA      Time 2  10 seconds   -RADHA         Exercise 3    Exercise Name 3  standing weightshifting R/L   -RADHA      Cueing 3  Verbal;Demo  -RADHA      Additional Comments  in front of mirror  -RADHA         Exercise 4    Exercise Name 4  standing diagonal A/P weightshifting  in front of mirror   -RADHA      Cueing 4  Verbal;Demo  -RADHA      Additional Comments  carried this over into gait while walking out door to improve equal weightbearing during gait   -RADHA        User Key  (r) = Recorded By, (t) = Taken By, (c) = Cosigned By    Initials Name Provider Type    Tesfaye Montanez, PTA Physical Therapy Assistant                      Manual Rx (last 36 hours)      Manual Treatments     Row Name 10/24/19 1552             Total Minutes    01471 - PT Manual Therapy Minutes  16  -RADHA         Manual Rx 1    Manual Rx 1 Location  prone with pillow: lumbar parapinals and (R) glute   -RADHA      Manual Rx 1 Type  IASTM with blude ridged foam roller   -RADHA      Manual Rx 1 Grade  moderate   -RADHA      Manual Rx 1 Duration  8  -RADHA         Manual Rx 2    Manual Rx 2 Location  LS distraction   -RADHA      Manual Rx 2 Grade  moderate   -RADHA      Manual Rx 2 Duration  3  -RADHA         Manual Rx 3    Manual Rx 3 Location  prone thoracic extension  -RADHA      Manual Rx 3 Type  manual mob  -RADHA      Manual Rx 3 Grade  2-3  -RADHA       Manual Rx 3 Duration  5  -RADHA        User Key  (r) = Recorded By, (t) = Taken By, (c) = Cosigned By    Initials Name Provider Type    Tesfaye Montanez PTA Physical Therapy Assistant          PT OP Goals     Row Name 10/24/19 1552          PT Short Term Goals    STG Date to Achieve  11/01/19  -RADHA     STG 1  Pt to improve thoracic mobility.  -RADHA     STG 1 Progress  Ongoing  -RADHA     STG 2  Pt to improve B hip PROM to 10 degrees w/o increased lumbar lordosis.  -RADHA     STG 2 Progress  Ongoing  -RADHA     STG 3  Pt to improve R hip PROM IR to symmetrical w/ L.  -RADHA     STG 3 Progress  Ongoing  -RADHA     STG 3 Progress Comments  he continues to be limited compared to left into IR, ER is WFL   -RADHA     STG 4  Pt to improve lumbar AROM to 75% w/o radicular symptoms.  -RADHA     STG 4 Progress  Ongoing  -RADHA     STG 4 Progress Comments  he did not have radicular symptoms present today   -RADHA        Long Term Goals    LTG Date to Achieve  11/22/19  -RADHA     LTG 1  Independent w/ HEP.  -RADHA     LTG 1 Progress  Ongoing  -RADHA     LTG 2  Pt to B SLS x10 sec w/ hip stability.  -RADHA     LTG 2 Progress  Ongoing  -RADHA     LTG 3  Pt to improve ADÁN to <10%.  -RADHA     LTG 3 Progress  Ongoing  -RADHA     LTG 4  Pt to report pain no higher than 4/10 x1 week.  -RADHA     LTG 4 Progress  Ongoing  -RADHA        Time Calculation    PT Goal Re-Cert Due Date  11/10/19  -RADHA       User Key  (r) = Recorded By, (t) = Taken By, (c) = Cosigned By    Initials Name Provider Type    Tesfaye Montanez PTA Physical Therapy Assistant          Therapy Education  Education Details: lateral weighshifts and diagonal A/P weightshifts with carry over into equal weightbearing in gait   Given: Posture/body mechanics  Program: New  How Provided: Verbal, Demonstration  Provided to: Patient  Level of Understanding: Verbalized, Demonstrated              Time Calculation:   Total Timed Code Minutes- PT: 45 minute(s)                Tesfaye Macias PTA  10/24/2019

## 2019-10-29 ENCOUNTER — TREATMENT (OUTPATIENT)
Dept: PHYSICAL THERAPY | Facility: CLINIC | Age: 49
End: 2019-10-29

## 2019-10-29 DIAGNOSIS — G89.29 CHRONIC BILATERAL LOW BACK PAIN WITH BILATERAL SCIATICA: Primary | ICD-10-CM

## 2019-10-29 DIAGNOSIS — M54.41 CHRONIC BILATERAL LOW BACK PAIN WITH BILATERAL SCIATICA: Primary | ICD-10-CM

## 2019-10-29 DIAGNOSIS — M54.42 CHRONIC BILATERAL LOW BACK PAIN WITH BILATERAL SCIATICA: Primary | ICD-10-CM

## 2019-10-29 PROCEDURE — 97140 MANUAL THERAPY 1/> REGIONS: CPT | Performed by: PHYSICAL THERAPIST

## 2019-10-29 PROCEDURE — 97110 THERAPEUTIC EXERCISES: CPT | Performed by: PHYSICAL THERAPIST

## 2019-10-29 NOTE — PROGRESS NOTES
Outpatient Physical Therapy Ortho Treatment Note       Patient Name: Randell Reddy  : 1970  MRN: [id number]  Today's Date: 10/29/2019      Visit Date: 10/29/2019    Visit Dx:    ICD-10-CM ICD-9-CM   1. Chronic bilateral low back pain with bilateral sciatica M54.42 724.2    M54.41 724.3    G89.29 338.29       Patient Active Problem List   Diagnosis   • Obesity due to excess calories   • Lumbar pain   • Pain in both lower extremities   • Numbness and tingling of both legs        Past Medical History:   Diagnosis Date   • Injury of back    • Kidney stone         Past Surgical History:   Procedure Laterality Date   • BACK SURGERY     • EXTRACORPOREAL SHOCK WAVE LITHOTRIPSY (ESWL)                         PT Assessment/Plan     Row Name 10/29/19 1600          PT Assessment    Assessment Comments  Pt presented with improved gait mechaincs and reports increased awareness with weigth shifting and bearing bilaterally. We also reviewed this today, he demonstrate L hip weakness compared to R with slight tredlenberg. We progressed with hip anbd core strengthening today and he was able to tolerate without an exacerbation of pain.  -WJ        PT Plan    PT Plan Comments  Continue to work on his hip mobility and progress with hip/core strengthening.  -WJ       User Key  (r) = Recorded By, (t) = Taken By, (c) = Cosigned By    Initials Name Provider Type    WJefry Acosta, PT DPT Physical Therapist            OP Exercises     Row Name 10/29/19 1500             Subjective Comments    Subjective Comments  Pt reports no new change, his low back and legs continue to be painful.  -WJ         Subjective Pain    Able to rate subjective pain?  yes  -WJ      Pre-Treatment Pain Level  4  -WJ      Post-Treatment Pain Level  4  -WJ         Total Minutes    07165 - PT Therapeutic Exercise Minutes  27  -WJ      91572 - PT Manual Therapy Minutes  18  -WJ         Exercise 1    Exercise Name 1  Graham hip stretching in all planes  -WJ       Cueing 1  Verbal;Tactile  -WJ      Time 1  10 minutes  -WJ         Exercise 2    Exercise Name 2  HL 65 cm physioball press  -WJ      Cueing 2  Verbal  -WJ      Sets 2  2  -WJ      Reps 2  10  -WJ      Time 2  3 sec   -WJ         Exercise 3    Exercise Name 3  B hip flexion/extension with 65 cm physioball  -WJ      Cueing 3  Verbal;Tactile  -WJ      Sets 3  1  -WJ      Reps 3  20  -WJ         Exercise 4    Exercise Name 4  B BKFO with RTB  -WJ      Cueing 4  Verbal;Tactile  -WJ      Sets 4  2  -WJ      Reps 4  10  -WJ         Exercise 5    Exercise Name 5  reviewed gait mechaincs and weight shifting mechanics  -WJ      Cueing 5  Verbal;Tactile  -WJ        User Key  (r) = Recorded By, (t) = Taken By, (c) = Cosigned By    Initials Name Provider Type    Jefry Joseph, PT DPT Physical Therapist                      Manual Rx (last 36 hours)      Manual Treatments     Row Name 10/29/19 1500             Total Minutes    34552 - PT Manual Therapy Minutes  18  -WJ         Manual Rx 1    Manual Rx 1 Location  prone lumbar parapinals and (R) glute   -WJ      Manual Rx 1 Type  IASTM with blue ridged foam roller   -WJ      Manual Rx 1 Grade  moderate   -WJ      Manual Rx 1 Duration  10  -WJ         Manual Rx 3    Manual Rx 3 Location  prone thoracic extension  -WJ      Manual Rx 3 Type    -WJ      Manual Rx 3 Grade  2-3  -WJ      Manual Rx 3 Duration  8  -WJ        User Key  (r) = Recorded By, (t) = Taken By, (c) = Cosigned By    Initials Name Provider Type    Jefry Joseph, PT DPT Physical Therapist          PT OP Goals     Row Name 10/29/19 1500          PT Short Term Goals    STG Date to Achieve  11/01/19  -WJ     STG 1  Pt to improve thoracic mobility.  -WJ     STG 1 Progress  Ongoing  -WJ     STG 1 Progress Comments  stiffness throughout the thoracic spine  -WJ     STG 2  Pt to improve B hip PROM to 10 degrees w/o increased lumbar lordosis.  -WJ     STG 2 Progress  Ongoing  -WJ     STG 3  Pt to improve R  hip PROM IR to symmetrical w/ L.  -W     STG 3 Progress  Ongoing  -W     STG 4  Pt to improve lumbar AROM to 75% w/o radicular symptoms.  -     STG 4 Progress  Ongoing  -        Long Term Goals    LTG Date to Achieve  11/22/19  -W     LTG 1  Independent w/ HEP.  -W     LTG 1 Progress  Ongoing  -W     LTG 2  Pt to B SLS x10 sec w/ hip stability.  -W     LTG 2 Progress  Ongoing  -W     LTG 3  Pt to improve ADÁN to <10%.  -W     LTG 3 Progress  Ongoing  -W     LTG 4  Pt to report pain no higher than 4/10 x1 week.  -     LTG 4 Progress  Ongoing  -        Time Calculation    PT Goal Re-Cert Due Date  11/10/19  -       User Key  (r) = Recorded By, (t) = Taken By, (c) = Cosigned By    Initials Name Provider Type    Jefry Acosta, PT DPT Physical Therapist          Therapy Education  Given: HEP  Program: Reinforced  How Provided: Verbal, Demonstration  Provided to: Patient  Level of Understanding: Verbalized, Demonstrated              Time Calculation:   Total Timed Code Minutes- PT: 45 minute(s)                Jefry Diamond, PT DPT  10/29/2019

## 2019-11-05 ENCOUNTER — TREATMENT (OUTPATIENT)
Dept: PHYSICAL THERAPY | Facility: CLINIC | Age: 49
End: 2019-11-05

## 2019-11-05 DIAGNOSIS — G89.29 CHRONIC BILATERAL LOW BACK PAIN WITH BILATERAL SCIATICA: Primary | ICD-10-CM

## 2019-11-05 DIAGNOSIS — M54.41 CHRONIC BILATERAL LOW BACK PAIN WITH BILATERAL SCIATICA: Primary | ICD-10-CM

## 2019-11-05 DIAGNOSIS — M54.42 CHRONIC BILATERAL LOW BACK PAIN WITH BILATERAL SCIATICA: Primary | ICD-10-CM

## 2019-11-05 PROCEDURE — 97140 MANUAL THERAPY 1/> REGIONS: CPT | Performed by: PHYSICAL THERAPIST

## 2019-11-05 PROCEDURE — 97110 THERAPEUTIC EXERCISES: CPT | Performed by: PHYSICAL THERAPIST

## 2019-11-05 NOTE — PROGRESS NOTES
Outpatient Physical Therapy Ortho Treatment Note       Patient Name: Randell Reddy  : 1970  MRN: [id number]  Today's Date: 2019      Visit Date: 2019    Visit Dx:    ICD-10-CM ICD-9-CM   1. Chronic bilateral low back pain with bilateral sciatica M54.42 724.2    M54.41 724.3    G89.29 338.29       Patient Active Problem List   Diagnosis   • Obesity due to excess calories   • Lumbar pain   • Pain in both lower extremities   • Numbness and tingling of both legs        Past Medical History:   Diagnosis Date   • Injury of back    • Kidney stone         Past Surgical History:   Procedure Laterality Date   • BACK SURGERY     • EXTRACORPOREAL SHOCK WAVE LITHOTRIPSY (ESWL)                         PT Assessment/Plan     Row Name 19 1543          PT Assessment    Assessment Comments  He ambulated with oversupination of the left foot, which may be chronic in nature.  however, this does impact gait mechanics and loading of the LLE and lumbar spine.  He was progressed with more hip strengthening.  His pain was more flared today without no known cause.   -RADHA        PT Plan    PT Plan Comments  We will continue to work to improve hip strengthening in more functional activities.  Focus will continue to be placed on improving hip flexibility.   -RADHA       User Key  (r) = Recorded By, (t) = Taken By, (c) = Cosigned By    Initials Name Provider Type    Tesfaye Montanez, ECTOR Physical Therapy Assistant            OP Exercises     Row Name 19 1549             Subjective Comments    Subjective Comments  Patient reports he was fine after the last session.  He reports today is not so good for his back.  He doesn't know what he did, but his pain is increased today.   He reports last night watching TV his left went completely numb and pain from knee down in right.  Currently the bottom of the left foot is tingling in the arch.    -RADHA         Subjective Pain    Able to rate subjective pain?  yes  -RADHA       "Pre-Treatment Pain Level  8  -RADHA      Post-Treatment Pain Level  8  -RADHA         Total Minutes    93290 - PT Therapeutic Exercise Minutes  19  -RADHA      15452 - PT Manual Therapy Minutes  24  -RADHA         Exercise 1    Exercise Name 1  B hip flexion/extension with 65 cm physioball  -RADHA      Cueing 1  Verbal  -RADHA      Sets 1  1  -RADHA      Reps 1  20  -RADHA         Exercise 2    Exercise Name 2  standing hip abduction  on 2\" step   -RADHA      Cueing 2  Verbal;Demo  -RADHA      Sets 2  2  -RADHA      Reps 2  10  -RADHA      Additional Comments  each step   -RADHA         Exercise 3    Exercise Name 3  side steps   -RADHA      Cueing 3  Verbal;Demo  -RADHA      Sets 3  2 times down and back  -RADHA      Time 3  20' each   -RADHA         Exercise 4    Exercise Name 4  seated antirotations with green can do  -RADHA      Cueing 4  Verbal;Demo  -RADHA      Sets 4  3 each  -RADHA      Time 4  30 seconds each   -RADHA        User Key  (r) = Recorded By, (t) = Taken By, (c) = Cosigned By    Initials Name Provider Type    RADHA Tesfaye Macias, PTA Physical Therapy Assistant                      Manual Rx (last 36 hours)      Manual Treatments     Row Name 11/05/19 1547             Total Minutes    76999 - PT Manual Therapy Minutes  24  -RADHA         Manual Rx 1    Manual Rx 1 Location  prone lumbar parapinals and (R) glute   -RADHA      Manual Rx 1 Type  IASTM with blue ridged foam roller   -RADHA      Manual Rx 1 Grade  moderate   -RADHA      Manual Rx 1 Duration  15  -RADHA         Manual Rx 2    Manual Rx 2 Location  prone hip IR/ER stretching with intermittent oscillations   -RADHA      Manual Rx 2 Duration  5  -RADHA         Manual Rx 3    Manual Rx 3 Location  LS distraction   -RADHA      Manual Rx 3 Duration  1  -RADHA         Manual Rx 4    Manual Rx 4 Location  (L) piriformis   -RADHA      Manual Rx 4 Type  STM   -RADHA      Manual Rx 4 Grade  moderate   -RADHA      Manual Rx 4 Duration  3  -RADHA        User Key  (r) = Recorded By, (t) = Taken By, (c) = Cosigned By    Initials Name Provider Type    " Tesfaye Montanez PTA Physical Therapy Assistant          PT OP Goals     Row Name 11/05/19 1547          PT Short Term Goals    STG Date to Achieve  11/01/19  -RADHA     STG 1  Pt to improve thoracic mobility.  -RADHA     STG 1 Progress  Ongoing  -RADHA     STG 2  Pt to improve B hip PROM to 10 degrees w/o increased lumbar lordosis.  -RADHA     STG 2 Progress  Ongoing  -RADHA     STG 2 Progress Comments  he continues to be tight   -RADHA     STG 3  Pt to improve R hip PROM IR to symmetrical w/ L.  -RADHA     STG 3 Progress  Ongoing  -RADHA     STG 4  Pt to improve lumbar AROM to 75% w/o radicular symptoms.  -RADHA     STG 4 Progress  Ongoing  -RADHA        Long Term Goals    LTG Date to Achieve  11/22/19  -RADHA     LTG 1  Independent w/ HEP.  -RADHA     LTG 1 Progress  Ongoing  -RADHA     LTG 2  Pt to B SLS x10 sec w/ hip stability.  -RADHA     LTG 2 Progress  Ongoing  -RADHA     LTG 3  Pt to improve ADÁN to <10%.  -RADHA     LTG 3 Progress  Ongoing  -RADHA     LTG 4  Pt to report pain no higher than 4/10 x1 week.  -RADHA     LTG 4 Progress  Ongoing  -RADHA        Time Calculation    PT Goal Re-Cert Due Date  11/10/19  -RADHA       User Key  (r) = Recorded By, (t) = Taken By, (c) = Cosigned By    Initials Name Provider Type    Tesfaye Montanez PTA Physical Therapy Assistant          Therapy Education  Education Details: standing hip abduction   Given: HEP  Program: New  How Provided: Verbal, Demonstration, Written  Provided to: Patient  Level of Understanding: Verbalized, Demonstrated              Time Calculation:   Total Timed Code Minutes- PT: 43 minute(s)                Tesfaye Macias PTA  11/5/2019

## 2019-11-07 ENCOUNTER — TREATMENT (OUTPATIENT)
Dept: PHYSICAL THERAPY | Facility: CLINIC | Age: 49
End: 2019-11-07

## 2019-11-07 DIAGNOSIS — M54.42 CHRONIC BILATERAL LOW BACK PAIN WITH BILATERAL SCIATICA: Primary | ICD-10-CM

## 2019-11-07 DIAGNOSIS — M54.41 CHRONIC BILATERAL LOW BACK PAIN WITH BILATERAL SCIATICA: Primary | ICD-10-CM

## 2019-11-07 DIAGNOSIS — G89.29 CHRONIC BILATERAL LOW BACK PAIN WITH BILATERAL SCIATICA: Primary | ICD-10-CM

## 2019-11-07 PROCEDURE — G0283 ELEC STIM OTHER THAN WOUND: HCPCS | Performed by: PHYSICAL THERAPIST

## 2019-11-07 PROCEDURE — 97110 THERAPEUTIC EXERCISES: CPT | Performed by: PHYSICAL THERAPIST

## 2019-11-07 NOTE — PROGRESS NOTES
Outpatient Physical Therapy Ortho Progress Note       Patient Name: Randell Reddy  : 1970  MRN: [id number]  Today's Date: 2019      Visit Date: 2019    Visit Dx:    ICD-10-CM ICD-9-CM   1. Chronic bilateral low back pain with bilateral sciatica M54.42 724.2    M54.41 724.3    G89.29 338.29       Patient Active Problem List   Diagnosis   • Obesity due to excess calories   • Lumbar pain   • Pain in both lower extremities   • Numbness and tingling of both legs        Past Medical History:   Diagnosis Date   • Injury of back    • Kidney stone         Past Surgical History:   Procedure Laterality Date   • BACK SURGERY     • EXTRACORPOREAL SHOCK WAVE LITHOTRIPSY (ESWL)                         PT Assessment/Plan     Row Name 19 1551          PT Assessment    Functional Limitations  Limitations in functional capacity and performance  -TB     Impairments  Joint integrity;Joint mobility;Motor function;Muscle strength;Pain;Poor body mechanics;Posture;Range of motion;Sensation  -TB     Assessment Comments  Patient has been flared up this week without known cause.  His reported pain was improving up until this week.  Therefore, today IFC was utilized for pain modulation.  His Modified Oswestry was increased today compared to the evaluation, but this is most likely due to increased pain this week.  His hip flexibility is progressing towards his goals, but he does continue to have tightness.  He would continue to benefit from skilled therapy to decrease pain, improve hip flexibility, and gradually build functional strength.  He works as an , which does cause him to get into odd positions to complete his job.  He feels he is about 50% of normal in regards to being able to do his work responsibilites.   -RADHA     Rehab Potential  Good  -TB     Patient/caregiver participated in establishment of treatment plan and goals  Yes  -TB     Patient would benefit from skilled therapy intervention  Yes   -TB        PT Plan    PT Frequency  2x/week;3x/week  -TB     Predicted Duration of Therapy Intervention (Therapy Eval)  4 weeks  -TB     Planned CPT's?  PT THER PROC EA 15 MIN: 96698;PT THER ACT EA 15 MIN: 12337;PT MANUAL THERAPY EA 15 MIN: 85449;PT NEUROMUSC RE-EDUCATION EA 15 MIN: 79510;PT GAIT TRAINING EA 15 MIN: 46623;PT SELF CARE/HOME MGMT/TRAIN EA 15: 34246;PT ELECTRICAL STIM UNATTEND: ;PT ELECTRICAL STIM ATTD EA 15 MIN: 84451;PT ULTRASOUND EA 15 MIN: 65141;PT TRACTION LUMBAR: 44508;PT HOT/COLD PACK WC NONMCARE: 77823;PT EVAL MOD COMPLELITY: 91253  -TB     PT Plan Comments  Continue to focus on pain modulation, hip flexibility, and gradually building functional strength with emphasis on body mechancis for work.  -RADHA       User Key  (r) = Recorded By, (t) = Taken By, (c) = Cosigned By    Initials Name Provider Type    TB Ruiz Mcclelland, PT Physical Therapist    Tesfaye Montanez, ECTOR Physical Therapy Assistant          Modalities     Row Name 11/07/19 1551             Moist Heat    MH Applied  Yes  -RADHA      Location  geovany lumbar   -RADHA      Rx Minutes  15 mins  -RADHA      MH Prior to Rx  -- during IFC   -RADHA         ELECTRICAL STIMULATION    Attended/Unattended  Unattended  -RADHA      Stimulation Type  IFC  -RADHA      Max mAmp  20  -RADHA      Location/Electrode Placement/Other  geovany lumbar   -RADHA       PT E-Stim Unattended (Manual) Minutes  15  -RADHA        User Key  (r) = Recorded By, (t) = Taken By, (c) = Cosigned By    Initials Name Provider Type    Tesfaye Montanez, ECTOR Physical Therapy Assistant        OP Exercises     Row Name 11/07/19 1553             Subjective Comments    Subjective Comments  Patient reports his symptoms are  worse today.  [facility]  He reports he was hurting after the last visit as well.  He thought about not coming to therapy today.   He still does not know what has caused the flare, but it has been flared up since the last weekend.   He  reports before this week he felt there was a little improvement.  He has been working on his walking.    -RADHA         Subjective Pain    Able to rate subjective pain?  yes  -RADHA      Pre-Treatment Pain Level  9  -RADHA      Post-Treatment Pain Level  7  -RADHA         Total Minutes    00547 - PT Therapeutic Exercise Minutes  24  -RADHA         Exercise 1    Exercise Name 1  addressed goals for progress note   -RADHA         Exercise 2    Exercise Name 2  sidelying hip flexor stretch   -RADHA      Cueing 2  Verbal;Tactile  -RADHA         Exercise 3    Exercise Name 3  hooklying IR hip stretching   -RADHA      Cueing 3  Verbal;Tactile  -RADHA        User Key  (r) = Recorded By, (t) = Taken By, (c) = Cosigned By    Initials Name Provider Type    RADHA Tesfaye Macias, PTA Physical Therapy Assistant                       PT OP Goals     Row Name 11/07/19 1551          PT Short Term Goals    STG Date to Achieve  11/01/19  -RADHA     STG 1  Pt to improve thoracic mobility.  -RADHA     STG 1 Progress  Ongoing  -RADHA     STG 1 Progress Comments  he remains stiff   -RADHA     STG 2  Pt to improve B hip PROM to 10 degrees w/o increased lumbar lordosis.  -RADHA     STG 2 Progress  Ongoing  -RADHA     STG 2 Progress Comments  Graham 5 degrees passively   -RADHA     STG 3  Pt to improve R hip PROM IR to symmetrical w/ L.  -RADHA     STG 3 Progress  Ongoing  -RADHA     STG 3 Progress Comments  (R) 35 (L) 39  -RADHA     STG 4  Pt to improve lumbar AROM to 75% w/o radicular symptoms.  -RADHA     STG 4 Progress  Ongoing  -RADHA     STG 4 Progress Comments  He reports his symptoms are worse this week without known cause .  He reports this is about the same.  -RADHA        Long Term Goals    LTG Date to Achieve  11/22/19  -RADHA     LTG 1  Independent w/ HEP.  -RADHA     LTG 1 Progress  Ongoing  -RADHA     LTG 1 Progress Comments  he has held these this week due to flare   -RADHA     LTG 2  Pt to B SLS x10 sec w/ hip stability.  -RADHA     LTG 2 Progress  Ongoing  -RADHA     LTG 2 Progress Comments  did not test today  due to his flared pain   -RADHA     LTG 3  Pt to improve ADÁN to <10%.  -RADHA     LTG 3 Progress  Ongoing  -RADHA     LTG 3 Progress Comments  42%   -RADHA     LTG 4  Pt to report pain no higher than 4/10 x1 week.  -RADHA     LTG 4 Progress  Ongoing  -RADHA     LTG 4 Progress Comments  9/10 today , and has been flared all week   -RADHA        Time Calculation    PT Goal Re-Cert Due Date  12/07/19  -RADHA       User Key  (r) = Recorded By, (t) = Taken By, (c) = Cosigned By    Initials Name Provider Type    Tesfaye Montanez, PTA Physical Therapy Assistant          Therapy Education  Education Details: rest over the week and we will gradually build again next week   Given: HEP  Program: Reinforced  How Provided: Verbal  Provided to: Patient  Level of Understanding: Verbalized    Outcome Measure Options: Modifed Owestry  Modified Oswestry  Modified Oswestry Score/Comments: 42%       Time Calculation:   Total Timed Code Minutes- PT: 24 minute(s)      PT G-Codes  Outcome Measure Options: Modifed Owestry  Modified Oswestry Score/Comments: 42%          Ruiz Mcclelland, PT  11/7/2019

## 2019-11-12 ENCOUNTER — TREATMENT (OUTPATIENT)
Dept: PHYSICAL THERAPY | Facility: CLINIC | Age: 49
End: 2019-11-12

## 2019-11-12 DIAGNOSIS — G89.29 CHRONIC BILATERAL LOW BACK PAIN WITH BILATERAL SCIATICA: Primary | ICD-10-CM

## 2019-11-12 DIAGNOSIS — M54.41 CHRONIC BILATERAL LOW BACK PAIN WITH BILATERAL SCIATICA: Primary | ICD-10-CM

## 2019-11-12 DIAGNOSIS — M54.42 CHRONIC BILATERAL LOW BACK PAIN WITH BILATERAL SCIATICA: Primary | ICD-10-CM

## 2019-11-12 PROCEDURE — 97140 MANUAL THERAPY 1/> REGIONS: CPT | Performed by: PHYSICAL THERAPIST

## 2019-11-12 PROCEDURE — 97110 THERAPEUTIC EXERCISES: CPT | Performed by: PHYSICAL THERAPIST

## 2019-11-12 NOTE — PROGRESS NOTES
Outpatient Physical Therapy Ortho Treatment Note       Patient Name: Randell Reddy  : 1970  MRN: [id number]  Today's Date: 2019      Visit Date: 2019    Visit Dx:    ICD-10-CM ICD-9-CM   1. Chronic bilateral low back pain with bilateral sciatica M54.42 724.2    M54.41 724.3    G89.29 338.29       Patient Active Problem List   Diagnosis   • Obesity due to excess calories   • Lumbar pain   • Pain in both lower extremities   • Numbness and tingling of both legs        Past Medical History:   Diagnosis Date   • Injury of back    • Kidney stone         Past Surgical History:   Procedure Laterality Date   • BACK SURGERY     • EXTRACORPOREAL SHOCK WAVE LITHOTRIPSY (ESWL)                         PT Assessment/Plan     Row Name 19 1500          PT Assessment    Assessment Comments  Pt pain was down from his previous two sessions and he reports that he feels as if it has been gradually decreasing. We focused on hip and spinalmobility today along with some light core and hip strenghtening. He was not able to perform a bridge today withou tan increase in symptoms.  -WJ        PT Plan    PT Plan Comments  Continue to work on hip and spinal mobility progressing with functional strengthening as tolerated.  -WJ       User Key  (r) = Recorded By, (t) = Taken By, (c) = Cosigned By    Initials Name Provider Type    WJefry Acosta, PT DPT Physical Therapist            OP Exercises     Row Name 19 1500             Subjective Comments    Subjective Comments  Pt reports a flarein symptoms over the past week and a half, however feels that his pain is starting to decrease again.  -WJ         Subjective Pain    Able to rate subjective pain?  yes  -WJ      Pre-Treatment Pain Level  6  -WJ      Post-Treatment Pain Level  6  -WJ         Total Minutes    89399 - PT Therapeutic Exercise Minutes  24  -WJ      33223 - PT Manual Therapy Minutes  20  -WJ         Exercise 1    Exercise Name 1  B hip stretche  sin flexion ER/IR  -WJ      Cueing 1  Verbal;Tactile  -WJ         Exercise 2    Exercise Name 2  attempted bridges with ER at peak  -WJ         Exercise 3    Exercise Name 3  HL BKFO  -WJ      Cueing 3  Verbal;Tactile  -WJ      Sets 3  2  -WJ      Reps 3  10  -WJ      Additional Comments  GTB  -WJ         Exercise 4    Exercise Name 4  B hip flexion/extension on 65 cm physioball  -WJ      Cueing 4  Verbal;Tactile  -WJ      Sets 4  2  -WJ      Reps 4  10  -WJ         Exercise 5    Exercise Name 5  HL 65 cm physioball press  -WJ      Cueing 5  Verbal;Tactile  -WJ      Sets 5  2  -WJ      Reps 5  10  -WJ      Time 5  3 sec hold  -WJ        User Key  (r) = Recorded By, (t) = Taken By, (c) = Cosigned By    Initials Name Provider Type    Jefry Acosta, PT DPT Physical Therapist                      Manual Rx (last 36 hours)      Manual Treatments     Row Name 11/12/19 1500             Total Minutes    06410 - PT Manual Therapy Minutes  20  -WJ         Manual Rx 1    Manual Rx 1 Location  prone lumbar parapinals and (R) glute   -WJ      Manual Rx 1 Type  IASTM with blue ridged foam roller   -WJ      Manual Rx 1 Grade  moderate   -WJ         Manual Rx 2    Manual Rx 2 Location  thoracic   -WJ      Manual Rx 2 Type    -WJ      Manual Rx 2 Grade  2-3  -WJ        User Key  (r) = Recorded By, (t) = Taken By, (c) = Cosigned By    Initials Name Provider Type    Jefry Acosta, PT DPT Physical Therapist          PT OP Goals     Row Name 11/12/19 1500          PT Short Term Goals    STG Date to Achieve  11/01/19  -     STG 1  Pt to improve thoracic mobility.  -WJ     STG 1 Progress  Ongoing  -     STG 2  Pt to improve B hip PROM to 10 degrees w/o increased lumbar lordosis.  -W     STG 2 Progress  Ongoing  -     STG 3  Pt to improve R hip PROM IR to symmetrical w/ L.  -W     STG 3 Progress  Ongoing  -     STG 4  Pt to improve lumbar AROM to 75% w/o radicular symptoms.  -     STG 4 Progress  Ongoing  -      STG 4 Progress Comments  continues to have radicular symptoms   -WJ        Long Term Goals    LTG Date to Achieve  11/22/19  -WJ     LTG 1  Independent w/ HEP.  -WJ     LTG 1 Progress  Ongoing  -WJ     LTG 2  Pt to B SLS x10 sec w/ hip stability.  -WJ     LTG 2 Progress  Ongoing  -WJ     LTG 3  Pt to improve ADÁN to <10%.  -WJ     LTG 3 Progress  Ongoing  -WJ     LTG 4  Pt to report pain no higher than 4/10 x1 week.  -WJ     LTG 4 Progress  Ongoing  -WJ        Time Calculation    PT Goal Re-Cert Due Date  12/07/19  -WJ       User Key  (r) = Recorded By, (t) = Taken By, (c) = Cosigned By    Initials Name Provider Type    Jefry Joseph, PT DPT Physical Therapist          Therapy Education  Given: HEP  Program: Reinforced  How Provided: Verbal  Provided to: Patient  Level of Understanding: Verbalized              Time Calculation:   Total Timed Code Minutes- PT: 44 minute(s)                Jefry Diamond, PT DPT  11/12/2019

## 2019-11-14 ENCOUNTER — TREATMENT (OUTPATIENT)
Dept: PHYSICAL THERAPY | Facility: CLINIC | Age: 49
End: 2019-11-14

## 2019-11-14 DIAGNOSIS — M54.41 CHRONIC BILATERAL LOW BACK PAIN WITH BILATERAL SCIATICA: Primary | ICD-10-CM

## 2019-11-14 DIAGNOSIS — M54.42 CHRONIC BILATERAL LOW BACK PAIN WITH BILATERAL SCIATICA: Primary | ICD-10-CM

## 2019-11-14 DIAGNOSIS — G89.29 CHRONIC BILATERAL LOW BACK PAIN WITH BILATERAL SCIATICA: Primary | ICD-10-CM

## 2019-11-14 PROCEDURE — 97140 MANUAL THERAPY 1/> REGIONS: CPT | Performed by: PHYSICAL THERAPIST

## 2019-11-14 NOTE — PROGRESS NOTES
Outpatient Physical Therapy Ortho Treatment Note       Patient Name: Randell Reddy  : 1970  MRN: [id number]  Today's Date: 2019      Visit Date: 2019    Visit Dx:    ICD-10-CM ICD-9-CM   1. Chronic bilateral low back pain with bilateral sciatica M54.42 724.2    M54.41 724.3    G89.29 338.29       Patient Active Problem List   Diagnosis   • Obesity due to excess calories   • Lumbar pain   • Pain in both lower extremities   • Numbness and tingling of both legs        Past Medical History:   Diagnosis Date   • Injury of back    • Kidney stone         Past Surgical History:   Procedure Laterality Date   • BACK SURGERY     • EXTRACORPOREAL SHOCK WAVE LITHOTRIPSY (ESWL)                         PT Assessment/Plan     Row Name 19 8538          PT Assessment    Assessment Comments  Lying supine today made his left leg go numb with a sharp pain. The tightness through his geovany hip flexors, thoracic kyphosis and LS ext would tend to throw his back into hyperextension. Our emphasis is still flexibiltiy to distribute movement away from his lower lumbar.  -TB        PT Plan    PT Plan Comments  Cont emphasis on aggressive flexibility and mobility. May try some more yoga poses to see if he can do these without flaring his radicular pain.  -TB       User Key  (r) = Recorded By, (t) = Taken By, (c) = Cosigned By    Initials Name Provider Type    TB Ruiz Mcclelland, PT Physical Therapist            OP Exercises     Row Name 19 9265             Subjective Comments    Subjective Comments  He's still having back and radicular leg pain.   -TB         Subjective Pain    Pre-Treatment Pain Level  6  -TB      Subjective Pain Comment  Pain all in lower lumbar now; occasionally, he will feel short spurts of pain and is able to get off this quickly.  -TB         Total Minutes    58525 - PT Manual Therapy Minutes  45  -TB        User Key  (r) = Recorded By, (t) = Taken By, (c) = Cosigned By    Initials Name  Provider Type    Ruiz Gibbons, PT Physical Therapist                      Manual Rx (last 36 hours)      Manual Treatments     Row Name 11/14/19 1545             Total Minutes    50494 - PT Manual Therapy Minutes  45  -TB         Manual Rx 1    Manual Rx 1 Location  prone LS manual distraction  -TB      Manual Rx 1 Type  also geovany CRESENCIO alternating PA's  -TB      Manual Rx 1 Grade  --  -TB         Manual Rx 2    Manual Rx 2 Location  thoracic   -TB      Manual Rx 2 Type    -TB      Manual Rx 2 Grade  2-3  -TB         Manual Rx 3    Manual Rx 3 Location  sacral float with hips at 90  -TB      Manual Rx 3 Type  OP through knees  -TB         Manual Rx 4    Manual Rx 4 Location  lumbar flexion/rotation sustained stretch  -TB      Manual Rx 4 Type  OP at sacrum supine  -TB      Manual Rx 4 Grade  x 2 sets each  -TB         Manual Rx 5    Manual Rx 5 Location  supine LAD uni to each leg  -TB         Manual Rx 6    Manual Rx 6 Location  supine IP stretch off edge of table with gentle sustained OP  -TB      Manual Rx 6 Type  opposite knee to chest  -TB        User Key  (r) = Recorded By, (t) = Taken By, (c) = Cosigned By    Initials Name Provider Type    TB Ruiz Mcclelland, PT Physical Therapist          PT OP Goals     Row Name 11/14/19 1545          PT Short Term Goals    STG Date to Achieve  11/01/19  -TB     STG 1  Pt to improve thoracic mobility.  -TB     STG 1 Progress  Ongoing  -TB     STG 2  Pt to improve B hip PROM to 10 degrees w/o increased lumbar lordosis.  -TB     STG 2 Progress  Ongoing  -TB     STG 3  Pt to improve R hip PROM IR to symmetrical w/ L.  -TB     STG 3 Progress  Ongoing  -TB     STG 4  Pt to improve lumbar AROM to 75% w/o radicular symptoms.  -TB     STG 4 Progress  Ongoing  -TB        Long Term Goals    LTG Date to Achieve  11/22/19  -TB     LTG 1  Independent w/ HEP.  -TB     LTG 1 Progress  Ongoing  -TB     LTG 2  Pt to B SLS x10 sec w/ hip stability.  -TB     LTG 2 Progress  Ongoing   -TB     LTG 3  Pt to improve ADÁN to <10%.  -TB     LTG 3 Progress  Ongoing  -TB     LTG 4  Pt to report pain no higher than 4/10 x1 week.  -TB     LTG 4 Progress  Ongoing  -TB     LTG 4 Progress Comments  pain staying aroudn 5/10  -TB        Time Calculation    PT Goal Re-Cert Due Date  12/07/19  -TB       User Key  (r) = Recorded By, (t) = Taken By, (c) = Cosigned By    Initials Name Provider Type    TB Ruiz Mcclelland, PT Physical Therapist                         Time Calculation:                    Ruiz Mcclelland, PT  11/14/2019

## 2019-11-19 ENCOUNTER — TREATMENT (OUTPATIENT)
Dept: PHYSICAL THERAPY | Facility: CLINIC | Age: 49
End: 2019-11-19

## 2019-11-19 DIAGNOSIS — M54.42 CHRONIC BILATERAL LOW BACK PAIN WITH BILATERAL SCIATICA: Primary | ICD-10-CM

## 2019-11-19 DIAGNOSIS — G89.29 CHRONIC BILATERAL LOW BACK PAIN WITH BILATERAL SCIATICA: Primary | ICD-10-CM

## 2019-11-19 DIAGNOSIS — M54.41 CHRONIC BILATERAL LOW BACK PAIN WITH BILATERAL SCIATICA: Primary | ICD-10-CM

## 2019-11-19 PROCEDURE — 97110 THERAPEUTIC EXERCISES: CPT | Performed by: PHYSICAL THERAPIST

## 2019-11-19 PROCEDURE — 97140 MANUAL THERAPY 1/> REGIONS: CPT | Performed by: PHYSICAL THERAPIST

## 2019-11-19 NOTE — PROGRESS NOTES
Outpatient Physical Therapy Ortho Treatment Note       Patient Name: Randell Reddy  : 1970  MRN: [id number]  Today's Date: 2019      Visit Date: 2019    Visit Dx:    ICD-10-CM ICD-9-CM   1. Chronic bilateral low back pain with bilateral sciatica M54.42 724.2    M54.41 724.3    G89.29 338.29       Patient Active Problem List   Diagnosis   • Obesity due to excess calories   • Lumbar pain   • Pain in both lower extremities   • Numbness and tingling of both legs        Past Medical History:   Diagnosis Date   • Injury of back    • Kidney stone         Past Surgical History:   Procedure Laterality Date   • BACK SURGERY     • EXTRACORPOREAL SHOCK WAVE LITHOTRIPSY (ESWL)                         PT Assessment/Plan     Row Name 19 1548          PT Assessment    Assessment Comments  Patient's symptoms continue to improve since his recent flare.  However, he continues to have radicular symptoms which are at an intermittent frequency but daily.   Flexibility continues to be the emphasis, but he was progressed with standing step ups today.  He was able to complete with neutral hips.   -RADHA        PT Plan    PT Plan Comments  Continue to work on flexibility and hip stability.   -RADHA       User Key  (r) = Recorded By, (t) = Taken By, (c) = Cosigned By    Initials Name Provider Type    Tesfaye Montanez, PTA Physical Therapy Assistant            OP Exercises     Row Name 19 1548 19 1500          Subjective Comments    Subjective Comments  Patient reports that the back is getting better from the recent flare. He reports his leg pain comes and goes depending on what he is doing.    When he does get it he has an ache with intermittent feeling of bugs crawling on him.    -RADHA  --        Subjective Pain    Able to rate subjective pain?  yes  -RADHA  --     Pre-Treatment Pain Level  5  -RADHA  --     Post-Treatment Pain Level  5  -RADHA  --        Total Minutes    43378 - PT Therapeutic Exercise Minutes  " 28  -RADHA  --     62454 - PT Manual Therapy Minutes  --  14  -RADHA        Exercise 1    Exercise Name 1  B hip stretches in flexion ER/IR  -RADHA  --     Cueing 1  Verbal;Demo  -RADHA  --        Exercise 2    Exercise Name 2  step ups onto 4\" step   -RADHA  --     Cueing 2  Verbal;Demo  -RADHA  --     Sets 2  1  -RADHA  --     Reps 2  10  -RADHA  --     Additional Comments  each leg   -RADHA  --        Exercise 3    Exercise Name 3  lateral step ups onto 4\" step   -RADHA  --     Cueing 3  Verbal;Demo  -RADHA  --     Sets 3  1  -RADHA  --     Reps 3  10  -RADHA  --     Additional Comments  each leg   -RADHA  --       User Key  (r) = Recorded By, (t) = Taken By, (c) = Cosigned By    Initials Name Provider Type    Tesfaye Montanez, ECTOR Physical Therapy Assistant                      Manual Rx (last 36 hours)      Manual Treatments     Row Name 11/19/19 1500             Total Minutes    76995 - PT Manual Therapy Minutes  14  -RADHA         Manual Rx 1    Manual Rx 1 Location  prone LS manual distraction  -RADHA      Manual Rx 1 Grade  moderate   -RADHA      Manual Rx 1 Duration  3  -RADHA         Manual Rx 2    Manual Rx 2 Location  thoracic   -RADHA      Manual Rx 2 Type  extension mobilizations   -RADHA      Manual Rx 2 Grade  2-3  -RADHA      Manual Rx 2 Duration  5  -RADHA         Manual Rx 3    Manual Rx 3 Location  supine IP stretch off edge of table with gentle sustained OP at opposite knee   -RADHA      Manual Rx 3 Type  opposite knee to chest  -RADHA      Manual Rx 3 Duration  6 total(3 each)   -RADHA        User Key  (r) = Recorded By, (t) = Taken By, (c) = Cosigned By    Initials Name Provider Type    Tesfaye Montanez PTA Physical Therapy Assistant          PT OP Goals     Row Name 11/19/19 1548          PT Short Term Goals    STG Date to Achieve  11/01/19  -RADHA     STG 1  Pt to improve thoracic mobility.  -RADHA     STG 1 Progress  Ongoing  -RADHA     STG 2  Pt to improve B hip PROM to 10 degrees w/o increased lumbar lordosis.  -RADHA     STG 2 Progress  Ongoing  -RADHA     STG 3  " Pt to improve R hip PROM IR to symmetrical w/ L.  -RADHA     STG 3 Progress  Ongoing  -RADHA     STG 4  Pt to improve lumbar AROM to 75% w/o radicular symptoms.  -RADHA     STG 4 Progress  Ongoing  -RADHA     STG 4 Progress Comments  he reports it is about the same, everyday at a come and go rate   -RADHA        Long Term Goals    LTG Date to Achieve  11/22/19  -RADHA     LTG 1  Independent w/ HEP.  -RADHA     LTG 1 Progress  Ongoing  -RADHA     LTG 2  Pt to B SLS x10 sec w/ hip stability.  -RADHA     LTG 2 Progress  Ongoing  -RADHA     LTG 3  Pt to improve ADÁN to <10%.  -RADHA     LTG 3 Progress  Ongoing  -RADHA     LTG 4  Pt to report pain no higher than 4/10 x1 week.  -RADHA     LTG 4 Progress  Ongoing  -RADHA        Time Calculation    PT Goal Re-Cert Due Date  12/07/19  -RADHA       User Key  (r) = Recorded By, (t) = Taken By, (c) = Cosigned By    Initials Name Provider Type    Tesfaye Montanez, PTA Physical Therapy Assistant          Therapy Education  Given: HEP  Program: Reinforced  How Provided: Verbal  Provided to: Patient  Level of Understanding: Verbalized              Time Calculation:   Total Timed Code Minutes- PT: 42 minute(s)                Tesfaye Macias PTA  11/19/2019

## 2019-11-20 ENCOUNTER — TELEPHONE (OUTPATIENT)
Dept: NEUROSURGERY | Facility: CLINIC | Age: 49
End: 2019-11-20

## 2019-11-20 NOTE — TELEPHONE ENCOUNTER
Attempted to contact patient to RS appointment from 11/27 to 11/22. LVM with patient's home and spouse number. Patient's cell was full VM.

## 2019-11-21 ENCOUNTER — TREATMENT (OUTPATIENT)
Dept: PHYSICAL THERAPY | Facility: CLINIC | Age: 49
End: 2019-11-21

## 2019-11-21 DIAGNOSIS — G89.29 CHRONIC BILATERAL LOW BACK PAIN WITH BILATERAL SCIATICA: Primary | ICD-10-CM

## 2019-11-21 DIAGNOSIS — M54.42 CHRONIC BILATERAL LOW BACK PAIN WITH BILATERAL SCIATICA: Primary | ICD-10-CM

## 2019-11-21 DIAGNOSIS — M54.41 CHRONIC BILATERAL LOW BACK PAIN WITH BILATERAL SCIATICA: Primary | ICD-10-CM

## 2019-11-21 PROCEDURE — 97140 MANUAL THERAPY 1/> REGIONS: CPT | Performed by: PHYSICAL THERAPIST

## 2019-11-21 PROCEDURE — 97110 THERAPEUTIC EXERCISES: CPT | Performed by: PHYSICAL THERAPIST

## 2019-11-22 ENCOUNTER — OFFICE VISIT (OUTPATIENT)
Dept: NEUROSURGERY | Facility: CLINIC | Age: 49
End: 2019-11-22

## 2019-11-22 VITALS — HEIGHT: 74 IN | BODY MASS INDEX: 40.43 KG/M2 | WEIGHT: 315 LBS

## 2019-11-22 DIAGNOSIS — M51.36 DEGENERATION OF LUMBAR INTERVERTEBRAL DISC: ICD-10-CM

## 2019-11-22 DIAGNOSIS — E66.01 CLASS 3 SEVERE OBESITY DUE TO EXCESS CALORIES WITH SERIOUS COMORBIDITY AND BODY MASS INDEX (BMI) OF 45.0 TO 49.9 IN ADULT (HCC): ICD-10-CM

## 2019-11-22 DIAGNOSIS — M48.062 SPINAL STENOSIS OF LUMBAR REGION WITH NEUROGENIC CLAUDICATION: Primary | ICD-10-CM

## 2019-11-22 DIAGNOSIS — Z78.9 NON-TOBACCO USER: ICD-10-CM

## 2019-11-22 PROBLEM — M51.369 DEGENERATION OF LUMBAR INTERVERTEBRAL DISC: Status: ACTIVE | Noted: 2019-11-22

## 2019-11-22 PROCEDURE — 99214 OFFICE O/P EST MOD 30 MIN: CPT | Performed by: NEUROLOGICAL SURGERY

## 2019-11-22 RX ORDER — INFLUENZA A VIRUS A/SINGAPORE/GP1908/2015 IVR-180A (H1N1) ANTIGEN (PROPIOLACTONE INACTIVATED), INFLUENZA A VIRUS A/SINGAPORE/INFIMH-16-0019/2016 IVR-186 (H3N2) ANTIGEN (PROPIOLACTONE INACTIVATED), INFLUENZA B VIRUS B/MARYLAND/15/2016 ANTIGEN (PROPIOLACTONE INACTIVATED), AND INFLUENZA B VIRUS B/PHUKET/3073/2013 BVR-1B ANTIGEN (PROPIOLACTONE INACTIVATED) 15; 15; 15; 15 UG/.5ML; UG/.5ML; UG/.5ML; UG/.5ML
0.25 INJECTION, SUSPENSION INTRAMUSCULAR ONCE
COMMUNITY
Start: 2019-11-13 | End: 2020-02-10

## 2019-11-22 RX ORDER — ALPRAZOLAM 1 MG/1
1 TABLET ORAL TAKE AS DIRECTED
Qty: 2 TABLET | Refills: 0 | Status: SHIPPED | OUTPATIENT
Start: 2019-11-22 | End: 2019-11-23

## 2019-11-22 RX ORDER — CELECOXIB 50 MG/1
50 CAPSULE ORAL 2 TIMES DAILY
Qty: 60 CAPSULE | Refills: 2 | Status: SHIPPED | OUTPATIENT
Start: 2019-11-22 | End: 2019-11-25

## 2019-11-22 NOTE — PROGRESS NOTES
Outpatient Physical Therapy Ortho Treatment Note       Patient Name: Randell Reddy  : 1970  MRN: [id number]  Today's Date: 2019      Visit Date: 2019    Visit Dx:    ICD-10-CM ICD-9-CM   1. Chronic bilateral low back pain with bilateral sciatica M54.42 724.2    M54.41 724.3    G89.29 338.29       Patient Active Problem List   Diagnosis   • Obesity due to excess calories   • Lumbar pain   • Pain in both lower extremities   • Numbness and tingling of both legs        Past Medical History:   Diagnosis Date   • Injury of back    • Kidney stone         Past Surgical History:   Procedure Laterality Date   • BACK SURGERY     • EXTRACORPOREAL SHOCK WAVE LITHOTRIPSY (ESWL)                         PT Assessment/Plan     Row Name 19 170          PT Assessment    Assessment Comments  He is still struggling with radicular pain and flares of LBP. Our approach has been to focus early on mobility through his thoracic and lumbosacral spine and hips and then progress more core stability and holding his alignment. He struggles with progression of the core exercises as his back pain and radicular symptoms are easily flared so we end up having to back off and work to relieve his symptoms. He has been consistent and faithful to his HEP and his PT visits.   -TB        PT Plan    PT Plan Comments  See what Dr. Paz and Chi decide and either continue to work to progress his stability or hold on PT.   -TB       User Key  (r) = Recorded By, (t) = Taken By, (c) = Cosigned By    Initials Name Provider Type    TB Ruiz Mcclelland, PT Physical Therapist            OP Exercises     Row Name 19 1700 19 7315          Subjective Comments    Subjective Comments  --  He says he's having more LBP. It's been flared since his last PT visit. He's having radicular pain as well.   -TB        Subjective Pain    Pre-Treatment Pain Level  --  7  -TB        Total Minutes    17347 - PT Therapeutic Exercise  Minutes  10  -TB  --     80613 - PT Manual Therapy Minutes  35  -TB  --        Exercise 1    Exercise Name 1  --  passive stretch geovany piriformis and OI  -TB        Exercise 2    Exercise Name 2  --  passive lumbar flexion/rotation stretch sustained with OP at sacrum  -TB       User Key  (r) = Recorded By, (t) = Taken By, (c) = Cosigned By    Initials Name Provider Type    Ruiz Gibbons, PT Physical Therapist                      Manual Rx (last 36 hours)      Manual Treatments     Row Name 11/21/19 1700             Total Minutes    10303 - PT Manual Therapy Minutes  35  -TB         Manual Rx 1    Manual Rx 1 Location  prone lower lumbar  -TB      Manual Rx 1 Type  STM  -TB         Manual Rx 2    Manual Rx 2 Location  prone LS manual distraction  -TB      Manual Rx 2 Type  with geovany sacral CRESENCIO PA's alternating  -TB         Manual Rx 3    Manual Rx 3 Location  lower thoracic PA's  -TB         Manual Rx 4    Manual Rx 4 Location  supine LE LAD  -TB      Manual Rx 4 Type  sustained strong OP  -TB        User Key  (r) = Recorded By, (t) = Taken By, (c) = Cosigned By    Initials Name Provider Type    TB Ruiz Mcclelland, PT Physical Therapist          PT OP Goals     Row Name 11/21/19 1700          PT Short Term Goals    STG Date to Achieve  11/01/19  -TB     STG 1  Pt to improve thoracic mobility.  -TB     STG 1 Progress  Ongoing  -TB     STG 2  Pt to improve B hip PROM to 10 degrees w/o increased lumbar lordosis.  -TB     STG 2 Progress  Ongoing  -TB     STG 3  Pt to improve R hip PROM IR to symmetrical w/ L.  -TB     STG 3 Progress  Progressing  -TB     STG 3 Progress Comments  within 4 deg  -TB     STG 4  Pt to improve lumbar AROM to 75% w/o radicular symptoms.  -TB     STG 4 Progress  Ongoing  -TB     STG 4 Progress Comments  still having radicular symptoms  -TB        Long Term Goals    LTG Date to Achieve  11/22/19  -TB     LTG 1  Independent w/ HEP.  -TB     LTG 1 Progress  Ongoing  -TB     LTG 2  Pt to B SLS  x10 sec w/ hip stability.  -TB     LTG 2 Progress  Ongoing  -TB     LTG 3  Pt to improve ADÁN to <10%.  -TB     LTG 3 Progress  Ongoing  -TB     LTG 4  Pt to report pain no higher than 4/10 x1 week.  -TB     LTG 4 Progress  Ongoing  -TB        Time Calculation    PT Goal Re-Cert Due Date  12/07/19  -TB       User Key  (r) = Recorded By, (t) = Taken By, (c) = Cosigned By    Initials Name Provider Type    TB Ruiz Mcclelland, PT Physical Therapist                         Time Calculation:                    Ruiz Mcclelland, PT  11/21/2019

## 2019-11-25 ENCOUNTER — TELEPHONE (OUTPATIENT)
Dept: NEUROSURGERY | Facility: CLINIC | Age: 49
End: 2019-11-25

## 2019-11-25 DIAGNOSIS — M48.062 SPINAL STENOSIS OF LUMBAR REGION WITH NEUROGENIC CLAUDICATION: Primary | ICD-10-CM

## 2019-11-25 DIAGNOSIS — M51.36 DEGENERATION OF LUMBAR INTERVERTEBRAL DISC: ICD-10-CM

## 2019-11-25 RX ORDER — CELECOXIB 100 MG/1
100 CAPSULE ORAL 2 TIMES DAILY PRN
Qty: 60 CAPSULE | Refills: 2 | OUTPATIENT
Start: 2019-11-25 | End: 2019-12-25

## 2019-11-25 NOTE — TELEPHONE ENCOUNTER
Gilberto with [facility] pharmacy calls, patient has provided them with Celebrex 50mg Rx. They do not carry this. They are asking if they can substitute for 100mg. Per Dr. Paz, obtained verbal that changing to Celebrex 100mg is fine. Continue with BID instructions.

## 2019-11-26 ENCOUNTER — TREATMENT (OUTPATIENT)
Dept: PHYSICAL THERAPY | Facility: CLINIC | Age: 49
End: 2019-11-26

## 2019-11-26 DIAGNOSIS — M54.41 CHRONIC BILATERAL LOW BACK PAIN WITH BILATERAL SCIATICA: Primary | ICD-10-CM

## 2019-11-26 DIAGNOSIS — G89.29 CHRONIC BILATERAL LOW BACK PAIN WITH BILATERAL SCIATICA: Primary | ICD-10-CM

## 2019-11-26 DIAGNOSIS — M54.42 CHRONIC BILATERAL LOW BACK PAIN WITH BILATERAL SCIATICA: Primary | ICD-10-CM

## 2019-11-26 PROCEDURE — 97110 THERAPEUTIC EXERCISES: CPT | Performed by: PHYSICAL THERAPIST

## 2019-11-26 PROCEDURE — 97140 MANUAL THERAPY 1/> REGIONS: CPT | Performed by: PHYSICAL THERAPIST

## 2019-11-26 NOTE — PROGRESS NOTES
Outpatient Physical Therapy Ortho Treatment Note       Patient Name: Randell Reddy  : 1970  MRN: [id number]  Today's Date: 2019      Visit Date: 2019    Visit Dx:    ICD-10-CM ICD-9-CM   1. Chronic bilateral low back pain with bilateral sciatica M54.42 724.2    M54.41 724.3    G89.29 338.29       Patient Active Problem List   Diagnosis   • Obesity due to excess calories   • Lumbar pain   • Pain in both lower extremities   • Numbness and tingling of both legs   • Degeneration of lumbar intervertebral disc   • Non-tobacco user        Past Medical History:   Diagnosis Date   • Injury of back    • Kidney stone         Past Surgical History:   Procedure Laterality Date   • BACK SURGERY     • EXTRACORPOREAL SHOCK WAVE LITHOTRIPSY (ESWL)                         PT Assessment/Plan     Row Name 19 1546          PT Assessment    Assessment Comments  Patient had a follow up with physician, and he wished for him to continue with physical therapy.  Today's focus was on mobility and flexibility again.  As this improves he needs to be gradually progressed with core and hip strengthening, but up until this time everytime he is progressed his pain flares up.    -RADHA        PT Plan    PT Plan Comments  Progress note next session and schedule more visits.  Continue with mobility and flexibility with gradual progressions of strengthening.   -RADHA       User Key  (r) = Recorded By, (t) = Taken By, (c) = Cosigned By    Initials Name Provider Type    Tesfaye Montanez, PTA Physical Therapy Assistant            OP Exercises     Row Name 19 1546             Subjective Comments    Subjective Comments  Patient reports that he had a doctor's appointment since the last session.  He was given  Flexeril and Celebrex.  He does see an improvement with Flexeril.  This did help him sleep.  His doctor did say that he needed surgery, but doctor doesn't want to do it yet as long as the pain can be managed.  [facility].    He reports he is better today than the last session.    He has been placing a pillow behind his back in his easy chair, and this has been helpling.   He reports he needs to lose weight and has started an intermittent fast.   -RADHA         Subjective Pain    Able to rate subjective pain?  yes  -RADHA      Pre-Treatment Pain Level  5  -RADHA      Post-Treatment Pain Level  4  -RADHA         Total Minutes    87053 - PT Therapeutic Exercise Minutes  17  -RADHA      51203 - PT Manual Therapy Minutes  27  -RADHA         Exercise 1    Exercise Name 1  passive piriformis stretch   -RADHA      Cueing 1  Verbal;Tactile  -RADHA      Time 1  each leg   -RADHA         Exercise 2    Exercise Name 2  small TLE stretches with legs on 65cm ball: min OP   -RADHA      Cueing 2  Tactile;Verbal  -RADHA        User Key  (r) = Recorded By, (t) = Taken By, (c) = Cosigned By    Initials Name Provider Type    Tesfaye Montanez PTA Physical Therapy Assistant                      Manual Rx (last 36 hours)      Manual Treatments     Row Name 11/26/19 1546             Total Minutes    73417 - PT Manual Therapy Minutes  27  -RADHA         Manual Rx 1    Manual Rx 1 Location  prone lower lumbar  -RADHA      Manual Rx 1 Type  STM   -RADHA      Manual Rx 1 Grade  moderate to deep   -RADHA      Manual Rx 1 Duration  10  -RADHA         Manual Rx 2    Manual Rx 2 Location  prone LS manual distraction  -RADHA      Manual Rx 2 Duration  4  -RADHA         Manual Rx 3    Manual Rx 3 Location  middle and lower thoracic   -RADHA      Manual Rx 3 Type  extension mobilizations   -RADHA      Manual Rx 3 Duration  5  -RADHA         Manual Rx 4    Manual Rx 4 Location  supine LE LAD ( each leg )   -RADHA      Manual Rx 4 Duration  8( 4 each)   -RADHA        User Key  (r) = Recorded By, (t) = Taken By, (c) = Cosigned By    Initials Name Provider Type    Tesfaye Montanez PTA Physical Therapy Assistant          PT OP Goals     Row Name 11/26/19 1546          PT  Short Term Goals    STG Date to Achieve  11/01/19  -RADHA     STG 1  Pt to improve thoracic mobility.  -RADHA     STG 1 Progress  Ongoing  -RADHA     STG 2  Pt to improve B hip PROM to 10 degrees w/o increased lumbar lordosis.  -RADHA     STG 2 Progress  Ongoing  -RADHA     STG 3  Pt to improve R hip PROM IR to symmetrical w/ L.  -RADHA     STG 3 Progress  Progressing  -RADHA     STG 3 Progress Comments  left piriformis was tighter than right today   -RADHA     STG 4  Pt to improve lumbar AROM to 75% w/o radicular symptoms.  -RADHA     STG 4 Progress  Ongoing  -RADHA        Long Term Goals    LTG Date to Achieve  11/22/19  -RADHA     LTG 1  Independent w/ HEP.  -RADHA     LTG 1 Progress  Ongoing  -RADHA     LTG 2  Pt to B SLS x10 sec w/ hip stability.  -RADHA     LTG 2 Progress  Ongoing  -RADHA     LTG 3  Pt to improve ADÁN to <10%.  -RADHA     LTG 3 Progress  Ongoing  -RADHA     LTG 4  Pt to report pain no higher than 4/10 x1 week.  -RADHA     LTG 4 Progress  Ongoing  -RADHA        Time Calculation    PT Goal Re-Cert Due Date  12/07/19  -RADHA       User Key  (r) = Recorded By, (t) = Taken By, (c) = Cosigned By    Initials Name Provider Type    Tesfaye Montanez PTA Physical Therapy Assistant          Therapy Education  Education Details: lumbar decompression with legs porpped on exercise ball   Given: Symptoms/condition management  Program: New  How Provided: Verbal  Provided to: Patient  Level of Understanding: Demonstrated              Time Calculation:   Total Timed Code Minutes- PT: 44 minute(s)                Tesfaye Macias PTA  11/26/2019

## 2019-12-03 ENCOUNTER — TREATMENT (OUTPATIENT)
Dept: PHYSICAL THERAPY | Facility: CLINIC | Age: 49
End: 2019-12-03

## 2019-12-03 DIAGNOSIS — M54.42 CHRONIC BILATERAL LOW BACK PAIN WITH BILATERAL SCIATICA: Primary | ICD-10-CM

## 2019-12-03 DIAGNOSIS — G89.29 CHRONIC BILATERAL LOW BACK PAIN WITH BILATERAL SCIATICA: Primary | ICD-10-CM

## 2019-12-03 DIAGNOSIS — M54.41 CHRONIC BILATERAL LOW BACK PAIN WITH BILATERAL SCIATICA: Primary | ICD-10-CM

## 2019-12-03 PROCEDURE — 97110 THERAPEUTIC EXERCISES: CPT | Performed by: PHYSICAL THERAPIST

## 2019-12-03 NOTE — PROGRESS NOTES
Outpatient Physical Therapy Ortho Progress Note       Patient Name: Randell Reddy  : 1970  MRN: [id number]  Today's Date: 12/3/2019      Visit Date: 2019    Visit Dx:    ICD-10-CM ICD-9-CM   1. Chronic bilateral low back pain with bilateral sciatica M54.42 724.2    M54.41 724.3    G89.29 338.29       Patient Active Problem List   Diagnosis   • Obesity due to excess calories   • Lumbar pain   • Pain in both lower extremities   • Numbness and tingling of both legs   • Degeneration of lumbar intervertebral disc   • Non-tobacco user        Past Medical History:   Diagnosis Date   • Injury of back    • Kidney stone         Past Surgical History:   Procedure Laterality Date   • BACK SURGERY     • EXTRACORPOREAL SHOCK WAVE LITHOTRIPSY (ESWL)                         PT Assessment/Plan     Row Name 19 1600          PT Assessment    Functional Limitations  Limitations in functional capacity and performance  -WJ     Impairments  Joint integrity;Joint mobility;Motor function;Muscle strength;Pain;Poor body mechanics;Posture;Range of motion;Sensation  -WJ     Assessment Comments  At this time [facility] his [facility] ROM has improved. We worke gareth trejo with hip and core strengthening in standing this date, he tolerated this well with only a slight increase in pain. He is approved 4 more visits and with these four we marcy continue to progress with strenghtening of the hips and core to decrease the load on the lumbar spine.   -WJ     Rehab Potential  Good  -WJ     Patient/caregiver participated in establishment of treatment plan and goals  Yes  -WJ     Patient would benefit from skilled therapy intervention  Yes  -WJ        PT Plan    PT Frequency  1x/week;2x/week  -WJ     Predicted Duration of Therapy Intervention (Therapy Eval)  4 weeks  -WJ     PT Plan Comments  Continue to work on his spinal mobility and progress with fucntional hip and core strengthening.  -WJ        User Key  (r) = Recorded By, (t) = Taken By, (c) = Cosigned By    Initials Name Provider Type    Jefry Joseph, KULDEEP DPT Physical Therapist            OP Exercises     Row Name 12/03/19 1500             Subjective Comments    Subjective Comments  Pt reports that he had a few rough days since his last visit but has been better of late.  -WJ         Subjective Pain    Able to rate subjective pain?  yes  -WJ      Pre-Treatment Pain Level  4  -WJ      Post-Treatment Pain Level  5  -WJ         Total Minutes    09118 - PT Therapeutic Exercise Minutes  44  -WJ         Exercise 1    Exercise Name 1  addressed all goals for progress note  -WJ         Exercise 2    Exercise Name 2  B hip stretching in all planes  -WJ      Cueing 2  Verbal;Tactile  -WJ         Exercise 3    Exercise Name 3  standing hip abduction with red can-do  -WJ      Cueing 3  Verbal;Demo  -WJ      Sets 3  1  -WJ      Reps 3  10  -WJ      Additional Comments  pain down the RLE  -WJ         Exercise 4    Exercise Name 4  standing hip extension with red can do  -WJ      Cueing 4  Verbal;Demo  -WJ      Sets 4  2  -WJ      Reps 4  10  -WJ         Exercise 5    Exercise Name 5  mini squats with UE support  -WJ      Cueing 5  Verbal;Tactile;Demo  -WJ      Sets 5  2  -WJ      Reps 5  10  -WJ         Exercise 6    Exercise Name 6  side stepping with GTB   -WJ      Cueing 6  Verbal;Tactile  -WJ      Reps 6  2 down and back  -WJ      Additional Comments  25 ft  -WJ         Exercise 7    Exercise Name 7  standing fire hydrants  -WJ      Cueing 7  Verbal;Tactile  -WJ      Sets 7  2  -WJ      Reps 7  10  -WJ         Exercise 8    Exercise Name 8  standing physioball press with 65 ball   -WJ      Cueing 8  Verbal;Tactile  -WJ      Sets 8  2  -WJ      Reps 8  10  -WJ      Time 8  3 sec hold   -WJ        User Key  (r) = Recorded By, (t) = Taken By, (c) = Cosigned By    Initials Name Provider Type    Jefry Joseph, KLUDEEP DPT Physical Therapist                        PT OP Goals     Row Name 12/03/19 1500          PT Short Term Goals    STG Date to Achieve  11/01/19  -     STG 1  Pt to improve thoracic mobility.  -     STG 1 Progress  Ongoing  -     STG 1 Progress Comments  reminas stiff throghout thoracic spine  -     STG 2  Pt to improve B hip PROM to 10 degrees w/o increased lumbar lordosis.  -     STG 2 Progress  Ongoing  -     STG 2 Progress Comments  L 8 deg; R 7 deg  -     STG 3  Pt to improve R hip PROM IR to symmetrical w/ L.  -W     STG 3 Progress  Met  -     STG 3 Progress Comments  L 16 deg; R 15 deg  -     STG 4  Pt to improve lumbar AROM to 75% w/o radicular symptoms.  -     STG 4 Progress  Met  -     STG 4 Progress Comments  no radicular symptoms and able to neraly reach his toes.  -        Long Term Goals    LTG Date to Achieve  11/22/19  -     LTG 1  Independent w/ HEP.  -     LTG 1 Progress  Ongoing  -     LTG 1 Progress Comments  pt reports that he has not been complaint with his exercises due to a busy schedule.  -     LTG 2  Pt to B SLS x10 sec w/ hip stability.  -     LTG 2 Progress  Met  -     LTG 2 Progress Comments  20 sec each leg  -     LTG 3  Pt to improve ADÁN to <10%.  -     LTG 3 Progress  Ongoing  -     LTG 3 Progress Comments  34%  -     LTG 4  Pt to report pain no higher than 4/10 x1 week.  -     LTG 4 Progress  Ongoing  -     LTG 4 Progress Comments  4/10   -        Time Calculation    PT Goal Re-Cert Due Date  01/02/20  -       User Key  (r) = Recorded By, (t) = Taken By, (c) = Cosigned By    Initials Name Provider Type    WJefry Acosta, PT DPT Physical Therapist          Therapy Education  Given: Symptoms/condition management  Program: Reinforced  How Provided: Verbal  Provided to: Patient  Level of Understanding: Demonstrated    Outcome Measure Options: Modifed Owestry  Modified Oswestry  Modified Oswestry Score/Comments: 34%      Time Calculation:   Total Timed Code Minutes- PT:  44 minute(s)      PT G-Codes  Outcome Measure Options: Modifed Owestry  Modified Oswestry Score/Comments: 34%         Jefry Diamond, PT DPT  12/3/2019

## 2019-12-10 ENCOUNTER — TELEPHONE (OUTPATIENT)
Dept: NEUROSURGERY | Facility: CLINIC | Age: 49
End: 2019-12-10

## 2019-12-17 ENCOUNTER — TREATMENT (OUTPATIENT)
Dept: PHYSICAL THERAPY | Facility: CLINIC | Age: 49
End: 2019-12-17

## 2019-12-17 DIAGNOSIS — M54.41 CHRONIC BILATERAL LOW BACK PAIN WITH BILATERAL SCIATICA: Primary | ICD-10-CM

## 2019-12-17 DIAGNOSIS — G89.29 CHRONIC BILATERAL LOW BACK PAIN WITH BILATERAL SCIATICA: Primary | ICD-10-CM

## 2019-12-17 DIAGNOSIS — M54.42 CHRONIC BILATERAL LOW BACK PAIN WITH BILATERAL SCIATICA: Primary | ICD-10-CM

## 2019-12-17 PROCEDURE — 97110 THERAPEUTIC EXERCISES: CPT | Performed by: PHYSICAL THERAPIST

## 2019-12-17 NOTE — PROGRESS NOTES
Outpatient Physical Therapy Ortho Treatment Note       Patient Name: Randell Reddy  : 1970  MRN: [id number]  Today's Date: 2019      Visit Date: 2019    Visit Dx:    ICD-10-CM ICD-9-CM   1. Chronic bilateral low back pain with bilateral sciatica M54.42 724.2    M54.41 724.3    G89.29 338.29       Patient Active Problem List   Diagnosis   • Obesity due to excess calories   • Lumbar pain   • Pain in both lower extremities   • Numbness and tingling of both legs   • Degeneration of lumbar intervertebral disc   • Non-tobacco user        Past Medical History:   Diagnosis Date   • Injury of back    • Kidney stone         Past Surgical History:   Procedure Laterality Date   • BACK SURGERY     • EXTRACORPOREAL SHOCK WAVE LITHOTRIPSY (ESWL)                         PT Assessment/Plan     Row Name 19 1542          PT Assessment    Assessment Comments  Patient reports he has lost 17 pounds in three weeks with intermittent fasting.  He did not have shooting pains present in the legs today, which is a positive finding.  He was progressed with strengthening last session without a reported flare, and therefore he was progressed with more strengthening today.  We will assess his response at his next visit.   -RADHA        PT Plan    PT Plan Comments  continue to work to improve his standing functional strengthening.   -RADHA       User Key  (r) = Recorded By, (t) = Taken By, (c) = Cosigned By    Initials Name Provider Type    Tesfaye Montanez, PTA Physical Therapy Assistant            OP Exercises     Row Name 19 1548             Subjective Comments    Subjective Comments  Patient reports he had to cancel his last appointment due to feeling bad.  He has finished a round of antibiotics.  Patient reports he feels like he is doing better.  He has started Celebrex.  Currently, he has his baseline pain but nothing on top of this.   He is down 17 pounds with intermittent fasting, which he has been doing  for 3 weeks now.   -RADHA         Subjective Pain    Able to rate subjective pain?  yes  -RADHA      Pre-Treatment Pain Level  3  -RADHA      Post-Treatment Pain Level  3  -RADHA      Subjective Pain Comment  no shooting pains in legs   -RADHA         Total Minutes    26433 - PT Therapeutic Exercise Minutes  45  -RADHA         Exercise 1    Exercise Name 1  B hip stretching in all planes  -RADHA      Time 1  15 minutes   -RADHA         Exercise 2    Exercise Name 2  standing hip abduction with red can-do  -RADHA      Cueing 2  Verbal;Tactile  -RADHA      Sets 2  1  -RADHA      Reps 2  10  -RADHA      Additional Comments  each leg   -RADHA         Exercise 3    Exercise Name 3  standing hip extension with red can do  -RADHA      Cueing 3  Verbal;Demo  -RADHA      Sets 3  1  -RADHA      Reps 3  10  -RADHA      Additional Comments  each leg   -RADHA         Exercise 4    Exercise Name 4  step ups onto exercise step   -RADHA      Cueing 4  Verbal;Demo  -RADHA      Sets 4  1  -RADHA      Reps 4  10  -RADHA      Additional Comments  each leg   -RADHA         Exercise 5    Exercise Name 5  mini squats with UE support  -RADHA      Cueing 5  Verbal;Tactile  -RADHA      Sets 5  2  -RADHA      Reps 5  10  -RADHA      Additional Comments  back was starting to get sore after this   -RADHA         Exercise 6    Exercise Name 6  hooklying 65cm ball press   -RADHA      Cueing 6  Verbal;Demo  -RADHA      Sets 6  2  -RADHA      Reps 6  10  -RADHA         Exercise 7    Exercise Name 7  hooklying heelslides on furniture sliders   -RADHA      Cueing 7  Verbal;Demo  -RADHA      Sets 7  2  -RADHA      Reps 7  10  -RADHA        User Key  (r) = Recorded By, (t) = Taken By, (c) = Cosigned By    Initials Name Provider Type    RADHA Tesfaye Macias, PTA Physical Therapy Assistant                       PT OP Goals     Row Name 12/17/19 1542          PT Short Term Goals    STG Date to Achieve  11/01/19  -RADHA     STG 1  Pt to improve thoracic mobility.  -RADHA     STG 1 Progress  Ongoing  -RADHA     STG 2  Pt to improve B hip PROM to 10 degrees w/o increased  lumbar lordosis.  -RADHA     STG 2 Progress  Ongoing  -RADHA     STG 3  Pt to improve R hip PROM IR to symmetrical w/ L.  -RADHA     STG 3 Progress  Met  -RADHA     STG 4  Pt to improve lumbar AROM to 75% w/o radicular symptoms.  -RADHA     STG 4 Progress  Met  -RADHA        Long Term Goals    LTG Date to Achieve  11/22/19  -RADHA     LTG 1  Independent w/ HEP.  -RADHA     LTG 1 Progress  Ongoing  -RADHA     LTG 2  Pt to B SLS x10 sec w/ hip stability.  -RADHA     LTG 2 Progress  Met  -RADHA     LTG 3  Pt to improve ADÁN to <10%.  -RADHA     LTG 3 Progress  Ongoing  -RADHA     LTG 4  Pt to report pain no higher than 4/10 x1 week.  -RADHA     LTG 4 Progress  Ongoing  -RADHA     LTG 4 Progress Comments  3/10 today   -RADHA        Time Calculation    PT Goal Re-Cert Due Date  01/02/20  -RADHA       User Key  (r) = Recorded By, (t) = Taken By, (c) = Cosigned By    Initials Name Provider Type    Tesfaye Montanez, ECTOR Physical Therapy Assistant          Therapy Education  Given: HEP  Program: Reinforced  How Provided: Verbal  Provided to: Patient  Level of Understanding: Verbalized              Time Calculation:   Total Timed Code Minutes- PT: 45 minute(s)                Tesfaye Macias PTA  12/17/2019

## 2019-12-19 ENCOUNTER — TREATMENT (OUTPATIENT)
Dept: PHYSICAL THERAPY | Facility: CLINIC | Age: 49
End: 2019-12-19

## 2019-12-19 DIAGNOSIS — M54.42 CHRONIC BILATERAL LOW BACK PAIN WITH BILATERAL SCIATICA: Primary | ICD-10-CM

## 2019-12-19 DIAGNOSIS — M54.41 CHRONIC BILATERAL LOW BACK PAIN WITH BILATERAL SCIATICA: Primary | ICD-10-CM

## 2019-12-19 DIAGNOSIS — G89.29 CHRONIC BILATERAL LOW BACK PAIN WITH BILATERAL SCIATICA: Primary | ICD-10-CM

## 2019-12-19 PROCEDURE — 97110 THERAPEUTIC EXERCISES: CPT | Performed by: PHYSICAL THERAPIST

## 2019-12-19 NOTE — PROGRESS NOTES
Outpatient Physical Therapy Ortho Treatment Note       Patient Name: Randell Reddy  : 1970  MRN: [id number]  Today's Date: 2019      Visit Date: 2019    Visit Dx:    ICD-10-CM ICD-9-CM   1. Chronic bilateral low back pain with bilateral sciatica M54.42 724.2    M54.41 724.3    G89.29 338.29       Patient Active Problem List   Diagnosis   • Obesity due to excess calories   • Lumbar pain   • Pain in both lower extremities   • Numbness and tingling of both legs   • Degeneration of lumbar intervertebral disc   • Non-tobacco user        Past Medical History:   Diagnosis Date   • Injury of back    • Kidney stone         Past Surgical History:   Procedure Laterality Date   • BACK SURGERY     • EXTRACORPOREAL SHOCK WAVE LITHOTRIPSY (ESWL)                         PT Assessment/Plan     Row Name 19 1600          PT Assessment    Assessment Comments  Today's session was cut short due to the patient needing to leave a little early, we addressed his hip mobility along with addtional hip and core strenghtening. He faitgued with standing activity and could only perfomr one rep of monster walking due to increased tightness in his hips.  -WJ        PT Plan    PT Plan Comments  Continue to progress with functional strengthening in standing and building his endurance.  -WJ       User Key  (r) = Recorded By, (t) = Taken By, (c) = Cosigned By    Initials Name Provider Type    WJefry Acosta, PT DPT Physical Therapist            OP Exercises     Row Name 19 1600 19 1500          Subjective Comments    Subjective Comments  --  Pt reports that he did well following his last sesison with just a little pain in the L leg, otherwise everything has been normal.  -WJ        Subjective Pain    Able to rate subjective pain?  --  yes  -WJ     Pre-Treatment Pain Level  --  4  -WJ        Total Minutes    67136 - PT Therapeutic Exercise Minutes  35  -WJ  --        Exercise 1    Exercise Name 1  --  B hip  stretching in all planes  -WJ     Time 1  --  15 minutes   -WJ        Exercise 2    Exercise Name 2  --  bridge with ER at peak with GTB  -WJ     Cueing 2  --  Verbal;Tactile  -WJ     Sets 2  --  2  -WJ     Reps 2  --  10  -WJ        Exercise 3    Exercise Name 3  --  fire hydrants  -WJ     Cueing 3  --  Verbal;Tactile  -WJ     Sets 3  --  2  -WJ     Reps 3  --  10  -WJ        Exercise 4    Exercise Name 4  --  squats with UE support   -WJ     Cueing 4  --  Verbal;Tactile  -WJ     Sets 4  --  2  -WJ     Reps 4  --  10  -WJ        Exercise 5    Exercise Name 5  --  lateral steps with green can-do   -WJ     Cueing 5  --  Verbal;Tactile  -WJ     Reps 5  --  2 down and back   -WJ     Additional Comments  --  30 ft  -WJ        Exercise 6    Exercise Name 6  --  monster walks with green can-do  -WJ     Cueing 6  --  Verbal;Tactile  -WJ     Reps 6  --  1 down and back  -WJ     Additional Comments  --  20 ft   -WJ       User Key  (r) = Recorded By, (t) = Taken By, (c) = Cosigned By    Initials Name Provider Type     Jefry Diamond, PT DPT Physical Therapist                       PT OP Goals     Row Name 12/19/19 1600          PT Short Term Goals    STG Date to Achieve  11/01/19  -     STG 1  Pt to improve thoracic mobility.  -     STG 1 Progress  Ongoing  -     STG 2  Pt to improve B hip PROM to 10 degrees w/o increased lumbar lordosis.  -     STG 2 Progress  Ongoing  -     STG 3  Pt to improve R hip PROM IR to symmetrical w/ L.  -     STG 3 Progress  Met  -     STG 4  Pt to improve lumbar AROM to 75% w/o radicular symptoms.  -     STG 4 Progress  Met  -        Long Term Goals    LTG Date to Achieve  11/22/19  -     LTG 1  Independent w/ HEP.  -     LTG 1 Progress  Ongoing  -     LTG 2  Pt to B SLS x10 sec w/ hip stability.  -     LTG 2 Progress  Met  -     LTG 3  Pt to improve ADÁN to <10%.  -     LTG 3 Progress  Ongoing  -WJ     LTG 4  Pt to report pain no higher than 4/10 x1 week.  -WJ      LTG 4 Progress  Ongoing  -WJ     LTG 4 Progress Comments  4/10  -WJ        Time Calculation    PT Goal Re-Cert Due Date  01/02/20  -WJ       User Key  (r) = Recorded By, (t) = Taken By, (c) = Cosigned By    Initials Name Provider Type    Jefry Joseph, PT DPT Physical Therapist          Therapy Education  Given: HEP  Program: Reinforced  How Provided: Verbal  Provided to: Patient  Level of Understanding: Verbalized              Time Calculation:   Total Timed Code Minutes- PT: 35 minute(s)                Jefry Diamond, PT DPT  12/19/2019

## 2020-01-07 ENCOUNTER — TREATMENT (OUTPATIENT)
Dept: PHYSICAL THERAPY | Facility: CLINIC | Age: 50
End: 2020-01-07

## 2020-01-07 DIAGNOSIS — M54.42 CHRONIC BILATERAL LOW BACK PAIN WITH BILATERAL SCIATICA: Primary | ICD-10-CM

## 2020-01-07 DIAGNOSIS — G89.29 CHRONIC BILATERAL LOW BACK PAIN WITH BILATERAL SCIATICA: Primary | ICD-10-CM

## 2020-01-07 DIAGNOSIS — M54.41 CHRONIC BILATERAL LOW BACK PAIN WITH BILATERAL SCIATICA: Primary | ICD-10-CM

## 2020-01-07 PROCEDURE — 97110 THERAPEUTIC EXERCISES: CPT | Performed by: PHYSICAL THERAPIST

## 2020-01-07 NOTE — PROGRESS NOTES
Outpatient Physical Therapy Ortho Treatment Note/Discharge Summary       Patient Name: Randell Reddy  : 1970  MRN: [id number]  Today's Date: 2020      Visit Date: 2020    Visit Dx:    ICD-10-CM ICD-9-CM   1. Chronic bilateral low back pain with bilateral sciatica M54.42 724.2    M54.41 724.3    G89.29 338.29       Patient Active Problem List   Diagnosis   • Obesity due to excess calories   • Lumbar pain   • Pain in both lower extremities   • Numbness and tingling of both legs   • Degeneration of lumbar intervertebral disc   • Non-tobacco user        Past Medical History:   Diagnosis Date   • Injury of back    • Kidney stone         Past Surgical History:   Procedure Laterality Date   • BACK SURGERY     • EXTRACORPOREAL SHOCK WAVE LITHOTRIPSY (ESWL)                         PT Assessment/Plan     Row Name 20 [id number]          PT Assessment    Assessment Comments  He has met or partially met 6/8 goals at this time, and he reports 80% improved.  His hip flexibility has improved greatly since the start of therapy, and his thoracic mobility was improved today as well.   All of this decreases unwanted load that could be placed on the lumbar spine.  At this point, he is being discharged with a maintanence HEP that will continue to bolster hip strengthening.   He asked to leave therapy a few minutes early today due to other obligations.   -RADHA        PT Plan    PT Plan Comments  D/C to HEP   -RADHA       User Key  (r) = Recorded By, (t) = Taken By, (c) = Cosigned By    Initials Name Provider Type    Tesfaye Montanez, PTA Physical Therapy Assistant              OP Exercises     Row Name 20 6182             Subjective Comments    Subjective Comments  Patient reports that things are good.   He thinks therapy has been helping him.  He reports he has seen improvements with his flexibility.  He also reports that the pain has gotten better.  He reports 80% improved at this time.  He reports he  continues to take it easy and not over do it.   He reports he has been on vacation and hasn't been here since before the holidays, and he did not regress.   -RADHA         Subjective Pain    Able to rate subjective pain?  yes  -RADHA      Pre-Treatment Pain Level  0  -RADHA      Post-Treatment Pain Level  0  -RADHA         Total Minutes    30918 - PT Therapeutic Exercise Minutes  30  -RADHA      96953 - PT Manual Therapy Minutes  5  -RADHA         Exercise 1    Exercise Name 1  addressed goals for discharge   -RADHA         Exercise 2    Exercise Name 2  hip IR/ER stretches   -RADHA      Cueing 2  Verbal;Demo  -RADHA         Exercise 3    Exercise Name 3  sidelying hip flexor stretch   -RADHA      Cueing 3  Verbal;Tactile  -RADHA         Exercise 4    Exercise Name 4  reviewed HEP   -RADHA        User Key  (r) = Recorded By, (t) = Taken By, (c) = Cosigned By    Initials Name Provider Type    Tesfaye Montanez, ECTOR Physical Therapy Assistant                        Manual Rx (last 36 hours)      Manual Treatments     Row Name 01/07/20 1547             Total Minutes    84854 - PT Manual Therapy Minutes  5  -RADHA         Manual Rx 1    Manual Rx 1 Location  prone thoracic mobility   -RADHA      Manual Rx 1 Type  extension mobilizations   -RADHA      Manual Rx 1 Grade  3  -RADHA      Manual Rx 1 Duration  5  -RADHA        User Key  (r) = Recorded By, (t) = Taken By, (c) = Cosigned By    Initials Name Provider Type    Tesfaye Montanez, ECTOR Physical Therapy Assistant          PT OP Goals     Row Name 01/07/20 1547          PT Short Term Goals    STG Date to Achieve  11/01/19  -RADHA     STG 1  Pt to improve thoracic mobility.  -RADHA     STG 1 Progress  Met  -RADHA     STG 1 Progress Comments  He had improved mobility today   -RADHA     STG 2  Pt to improve B hip PROM to 10 degrees w/o increased lumbar lordosis.  -RADHA     STG 2 Progress  Met  -RADHA     STG 2 Progress Comments  R: 16 degrees L: 14 degrees  -RADHA     STG 3  Pt to improve R hip PROM IR to symmetrical w/ L.  -RADHA      STG 3 Progress  Met  -RADHA     STG 3 Progress Comments  L: 38 deg R:36 degrees   -RADHA     STG 4  Pt to improve lumbar AROM to 75% w/o radicular symptoms.  -RADHA     STG 4 Progress  Met  -RADHA        Long Term Goals    LTG Date to Achieve  11/22/19  -RADHA     LTG 1  Independent w/ HEP.  -RADHA     LTG 1 Progress  Partially Met  -RADHA     LTG 1 Progress Comments  He was given a compiled list today for a maintance program.  He reports off and on compliance to this point   -RADHA     LTG 2  Pt to B SLS x10 sec w/ hip stability.  -RADHA     LTG 2 Progress  Met  -RADHA     LTG 3  Pt to improve ADÁN to <10%.  -RADHA     LTG 3 Progress  Not Met  -RADHA     LTG 3 Progress Comments  16%  -RADHA     LTG 4  Pt to report pain no higher than 4/10 x1 week.  -RADHA     LTG 4 Progress  Not Met  -RADHA     LTG 4 Progress Comments  He reports over the last week the highest pain has been 7/10, and this was due to walking a lot on vacation.  He had no pain today   -RADHA        Time Calculation    PT Goal Re-Cert Due Date  01/02/20  -RADHA       User Key  (r) = Recorded By, (t) = Taken By, (c) = Cosigned By    Initials Name Provider Type    Tesfaye Montanez PTA Physical Therapy Assistant          Therapy Education  Education Details: compiled list for HEP: see media section     Outcome Measure Options: Modifed Owestry  Modified Oswestry  Modified Oswestry Score/Comments: 16%      Time Calculation:   PT Non-Billable Time (min): (left 5 minutes early, 5 minutes of unbillable time )  Total Timed Code Minutes- PT: 35 minute(s)      PT G-Codes  Outcome Measure Options: Modifed Owestry  Modified Oswestry Score/Comments: 16%     OP PT Discharge Summary  Date of Discharge: 01/07/20  Reason for Discharge: Independent  Outcomes Achieved: Refer to plan of care for updates on goals achieved  Discharge Destination: Home with home program  Discharge Instructions/Additional Comments: D/C to HEP       Tesfaye Macisa PTA  1/7/2020

## 2020-01-08 NOTE — PROGRESS NOTES
I have reviewed the notes, assessments, and/or procedures performed by Tesfaye Macias PTA, I concur with her/his documentation of Randell Reddy.

## 2020-01-10 ENCOUNTER — HOSPITAL ENCOUNTER (OUTPATIENT)
Dept: MRI IMAGING | Facility: HOSPITAL | Age: 50
Discharge: HOME | End: 2020-01-10
Admitting: NEUROLOGICAL SURGERY

## 2020-01-10 DIAGNOSIS — M51.36 DEGENERATION OF LUMBAR INTERVERTEBRAL DISC: ICD-10-CM

## 2020-01-10 DIAGNOSIS — M48.062 SPINAL STENOSIS OF LUMBAR REGION WITH NEUROGENIC CLAUDICATION: ICD-10-CM

## 2020-01-10 PROCEDURE — 72148 MRI LUMBAR SPINE W/O DYE: CPT

## 2020-02-05 ENCOUNTER — TELEPHONE (OUTPATIENT)
Dept: NEUROSURGERY | Facility: CLINIC | Age: 50
End: 2020-02-05

## 2020-02-05 NOTE — TELEPHONE ENCOUNTER
----- Message from Gigi Paz MD sent at 2/4/2020 10:49 PM CST -----  Disc herniation looks worse. Needs to be seen for surgical discussion.      Thanks  sofiya

## 2020-02-10 ENCOUNTER — OFFICE VISIT (OUTPATIENT)
Dept: NEUROSURGERY | Facility: CLINIC | Age: 50
End: 2020-02-10

## 2020-02-10 VITALS — BODY MASS INDEX: 41.75 KG/M2 | WEIGHT: 315 LBS | HEIGHT: 73 IN

## 2020-02-10 DIAGNOSIS — Z78.9 NON-TOBACCO USER: ICD-10-CM

## 2020-02-10 DIAGNOSIS — M51.16 LUMBAR DISC HERNIATION WITH RADICULOPATHY: Primary | ICD-10-CM

## 2020-02-10 DIAGNOSIS — E66.01 CLASS 3 SEVERE OBESITY DUE TO EXCESS CALORIES WITH SERIOUS COMORBIDITY AND BODY MASS INDEX (BMI) OF 45.0 TO 49.9 IN ADULT (HCC): ICD-10-CM

## 2020-02-10 PROCEDURE — 99214 OFFICE O/P EST MOD 30 MIN: CPT | Performed by: NEUROLOGICAL SURGERY

## 2020-02-10 RX ORDER — CYCLOBENZAPRINE HCL 5 MG
5 TABLET ORAL
Qty: 30 TABLET | Refills: 6 | Status: SHIPPED | OUTPATIENT
Start: 2020-02-10 | End: 2020-03-11

## 2020-02-10 RX ORDER — CELECOXIB 100 MG/1
100 CAPSULE ORAL 2 TIMES DAILY
COMMUNITY
End: 2021-01-10

## 2020-02-10 NOTE — PATIENT INSTRUCTIONS
"PATIENT TO CONTINUE TO FOLLOW UP WITH HIS/HER PRIMARY CARE PROVIDER FOR YEARLY PHYSICAL EXAMS TO ENSURE COMPLETE HEALTH MAINTENANCE      DASH Eating Plan  DASH stands for \"Dietary Approaches to Stop Hypertension.\" The DASH eating plan is a healthy eating plan that has been shown to reduce high blood pressure (hypertension). It may also reduce your risk for type 2 diabetes, heart disease, and stroke. The DASH eating plan may also help with weight loss.  What are tips for following this plan?    General guidelines  · Avoid eating more than 2,300 mg (milligrams) of salt (sodium) a day. If you have hypertension, you may need to reduce your sodium intake to 1,500 mg a day.  · Limit alcohol intake to no more than 1 drink a day for nonpregnant women and 2 drinks a day for men. One drink equals 12 oz of beer, 5 oz of wine, or 1½ oz of hard liquor.  · Work with your health care provider to maintain a healthy body weight or to lose weight. Ask what an ideal weight is for you.  · Get at least 30 minutes of exercise that causes your heart to beat faster (aerobic exercise) most days of the week. Activities may include walking, swimming, or biking.  · Work with your health care provider or diet and nutrition specialist (dietitian) to adjust your eating plan to your individual calorie needs.  Reading food labels    · Check food labels for the amount of sodium per serving. Choose foods with less than 5 percent of the Daily Value of sodium. Generally, foods with less than 300 mg of sodium per serving fit into this eating plan.  · To find whole grains, look for the word \"whole\" as the first word in the ingredient list.  Shopping  · Buy products labeled as \"low-sodium\" or \"no salt added.\"  · Buy fresh foods. Avoid canned foods and premade or frozen meals.  Cooking  · Avoid adding salt when cooking. Use salt-free seasonings or herbs instead of table salt or sea salt. Check with your health care provider or pharmacist before using salt " substitutes.  · Do not martin foods. Cook foods using healthy methods such as baking, boiling, grilling, and broiling instead.  · Cook with heart-healthy oils, such as olive, canola, soybean, or sunflower oil.  Meal planning  · Eat a balanced diet that includes:  ? 5 or more servings of fruits and vegetables each day. At each meal, try to fill half of your plate with fruits and vegetables.  ? Up to 6-8 servings of whole grains each day.  ? Less than 6 oz of lean meat, poultry, or fish each day. A 3-oz serving of meat is about the same size as a deck of cards. One egg equals 1 oz.  ? 2 servings of low-fat dairy each day.  ? A serving of nuts, seeds, or beans 5 times each week.  ? Heart-healthy fats. Healthy fats called Omega-3 fatty acids are found in foods such as flaxseeds and coldwater fish, like sardines, salmon, and mackerel.  · Limit how much you eat of the following:  ? Canned or prepackaged foods.  ? Food that is high in trans fat, such as fried foods.  ? Food that is high in saturated fat, such as fatty meat.  ? Sweets, desserts, sugary drinks, and other foods with added sugar.  ? Full-fat dairy products.  · Do not salt foods before eating.  · Try to eat at least 2 vegetarian meals each week.  · Eat more home-cooked food and less restaurant, buffet, and fast food.  · When eating at a restaurant, ask that your food be prepared with less salt or no salt, if possible.  What foods are recommended?  The items listed may not be a complete list. Talk with your dietitian about what dietary choices are best for you.  Grains  Whole-grain or whole-wheat bread. Whole-grain or whole-wheat pasta. Brown rice. Oatmeal. Quinoa. Bulgur. Whole-grain and low-sodium cereals. Marline bread. Low-fat, low-sodium crackers. Whole-wheat flour tortillas.  Vegetables  Fresh or frozen vegetables (raw, steamed, roasted, or grilled). Low-sodium or reduced-sodium tomato and vegetable juice. Low-sodium or reduced-sodium tomato sauce and tomato  paste. Low-sodium or reduced-sodium canned vegetables.  Fruits  All fresh, dried, or frozen fruit. Canned fruit in natural juice (without added sugar).  Meat and other protein foods  Skinless chicken or turkey. Ground chicken or turkey. Pork with fat trimmed off. Fish and seafood. Egg whites. Dried beans, peas, or lentils. Unsalted nuts, nut butters, and seeds. Unsalted canned beans. Lean cuts of beef with fat trimmed off. Low-sodium, lean deli meat.  Dairy  Low-fat (1%) or fat-free (skim) milk. Fat-free, low-fat, or reduced-fat cheeses. Nonfat, low-sodium ricotta or cottage cheese. Low-fat or nonfat yogurt. Low-fat, low-sodium cheese.  Fats and oils  Soft margarine without trans fats. Vegetable oil. Low-fat, reduced-fat, or light mayonnaise and salad dressings (reduced-sodium). Canola, safflower, olive, soybean, and sunflower oils. Avocado.  Seasoning and other foods  Herbs. Spices. Seasoning mixes without salt. Unsalted popcorn and pretzels. Fat-free sweets.  What foods are not recommended?  The items listed may not be a complete list. Talk with your dietitian about what dietary choices are best for you.  Grains  Baked goods made with fat, such as croissants, muffins, or some breads. Dry pasta or rice meal packs.  Vegetables  Creamed or fried vegetables. Vegetables in a cheese sauce. Regular canned vegetables (not low-sodium or reduced-sodium). Regular canned tomato sauce and paste (not low-sodium or reduced-sodium). Regular tomato and vegetable juice (not low-sodium or reduced-sodium). Pickles. Olives.  Fruits  Canned fruit in a light or heavy syrup. Fried fruit. Fruit in cream or butter sauce.  Meat and other protein foods  Fatty cuts of meat. Ribs. Fried meat. Chow. Sausage. Bologna and other processed lunch meats. Salami. Fatback. Hotdogs. Bratwurst. Salted nuts and seeds. Canned beans with added salt. Canned or smoked fish. Whole eggs or egg yolks. Chicken or turkey with skin.  Dairy  Whole or 2% milk,  cream, and half-and-half. Whole or full-fat cream cheese. Whole-fat or sweetened yogurt. Full-fat cheese. Nondairy creamers. Whipped toppings. Processed cheese and cheese spreads.  Fats and oils  Butter. Stick margarine. Lard. Shortening. Ghee. Chow fat. Tropical oils, such as coconut, palm kernel, or palm oil.  Seasoning and other foods  Salted popcorn and pretzels. Onion salt, garlic salt, seasoned salt, table salt, and sea salt. Worcestershire sauce. Tartar sauce. Barbecue sauce. Teriyaki sauce. Soy sauce, including reduced-sodium. Steak sauce. Canned and packaged gravies. Fish sauce. Oyster sauce. Cocktail sauce. Horseradish that you find on the shelf. Ketchup. Mustard. Meat flavorings and tenderizers. Bouillon cubes. Hot sauce and Tabasco sauce. Premade or packaged marinades. Premade or packaged taco seasonings. Relishes. Regular salad dressings.  Where to find more information:  · National Heart, Lung, and [employer] [place]: www.nhlbi.nih.gov  · American Heart Association: www.heart.org  Summary  · The DASH eating plan is a healthy eating plan that has been shown to reduce high blood pressure (hypertension). It may also reduce your risk for type 2 diabetes, heart disease, and stroke.  · With the DASH eating plan, you should limit salt (sodium) intake to 2,300 mg a day. If you have hypertension, you may need to reduce your sodium intake to 1,500 mg a day.  · When on the DASH eating plan, aim to eat more fresh fruits and vegetables, whole grains, lean proteins, low-fat dairy, and heart-healthy fats.  · Work with your health care provider or diet and nutrition specialist (dietitian) to adjust your eating plan to your individual calorie needs.  This information is not intended to replace advice given to you by your health care provider. Make sure you discuss any questions you have with your health care provider.  Document Released: 12/06/2012 Document Revised: 12/11/2017 Document Reviewed: 12/11/2017  [employer]  Interactive Patient Education © 2019 [employer]
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
## 2020-06-02 ENCOUNTER — OFFICE VISIT (OUTPATIENT)
Dept: NEUROSURGERY | Facility: CLINIC | Age: 50
End: 2020-06-02

## 2020-06-02 VITALS — HEIGHT: 73 IN | BODY MASS INDEX: 41.75 KG/M2 | WEIGHT: 315 LBS

## 2020-06-02 DIAGNOSIS — M43.10 RETROLISTHESIS: ICD-10-CM

## 2020-06-02 DIAGNOSIS — Z78.9 NON-TOBACCO USER: ICD-10-CM

## 2020-06-02 DIAGNOSIS — E66.01 MORBID OBESITY WITH BMI OF 45.0-49.9, ADULT (HCC): ICD-10-CM

## 2020-06-02 DIAGNOSIS — M51.36 DEGENERATION OF LUMBAR INTERVERTEBRAL DISC: Primary | ICD-10-CM

## 2020-06-02 PROCEDURE — 99213 OFFICE O/P EST LOW 20 MIN: CPT | Performed by: NURSE PRACTITIONER

## 2020-06-02 RX ORDER — CYCLOBENZAPRINE HCL 10 MG
10 TABLET ORAL 3 TIMES DAILY PRN
Status: ON HOLD | COMMUNITY
End: 2022-07-25

## 2020-06-02 RX ORDER — PREGABALIN 75 MG/1
75 CAPSULE ORAL 2 TIMES DAILY
COMMUNITY
End: 2021-01-10

## 2020-06-02 RX ORDER — IBUPROFEN 800 MG/1
800 TABLET ORAL EVERY 8 HOURS PRN
Status: ON HOLD | COMMUNITY
Start: 2020-05-11 | End: 2022-07-26

## 2020-06-02 NOTE — PATIENT INSTRUCTIONS
"PATIENT TO CONTINUE TO FOLLOW UP WITH HIS/HER PRIMARY CARE PROVIDER FOR YEARLY PHYSICAL EXAMS TO ENSURE COMPLETE HEALTH MAINTENANCE      DASH Eating Plan  DASH stands for \"Dietary Approaches to Stop Hypertension.\" The DASH eating plan is a healthy eating plan that has been shown to reduce high blood pressure (hypertension). It may also reduce your risk for type 2 diabetes, heart disease, and stroke. The DASH eating plan may also help with weight loss.  What are tips for following this plan?    General guidelines  · Avoid eating more than 2,300 mg (milligrams) of salt (sodium) a day. If you have hypertension, you may need to reduce your sodium intake to 1,500 mg a day.  · Limit alcohol intake to no more than 1 drink a day for nonpregnant women and 2 drinks a day for men. One drink equals 12 oz of beer, 5 oz of wine, or 1½ oz of hard liquor.  · Work with your health care provider to maintain a healthy body weight or to lose weight. Ask what an ideal weight is for you.  · Get at least 30 minutes of exercise that causes your heart to beat faster (aerobic exercise) most days of the week. Activities may include walking, swimming, or biking.  · Work with your health care provider or diet and nutrition specialist (dietitian) to adjust your eating plan to your individual calorie needs.  Reading food labels    · Check food labels for the amount of sodium per serving. Choose foods with less than 5 percent of the Daily Value of sodium. Generally, foods with less than 300 mg of sodium per serving fit into this eating plan.  · To find whole grains, look for the word \"whole\" as the first word in the ingredient list.  Shopping  · Buy products labeled as \"low-sodium\" or \"no salt added.\"  · Buy fresh foods. Avoid canned foods and premade or frozen meals.  Cooking  · Avoid adding salt when cooking. Use salt-free seasonings or herbs instead of table salt or sea salt. Check with your health care provider or pharmacist before using salt " substitutes.  · Do not martin foods. Cook foods using healthy methods such as baking, boiling, grilling, and broiling instead.  · Cook with heart-healthy oils, such as olive, canola, soybean, or sunflower oil.  Meal planning  · Eat a balanced diet that includes:  ? 5 or more servings of fruits and vegetables each day. At each meal, try to fill half of your plate with fruits and vegetables.  ? Up to 6-8 servings of whole grains each day.  ? Less than 6 oz of lean meat, poultry, or fish each day. A 3-oz serving of meat is about the same size as a deck of cards. One egg equals 1 oz.  ? 2 servings of low-fat dairy each day.  ? A serving of nuts, seeds, or beans 5 times each week.  ? Heart-healthy fats. Healthy fats called Omega-3 fatty acids are found in foods such as flaxseeds and coldwater fish, like sardines, salmon, and mackerel.  · Limit how much you eat of the following:  ? Canned or prepackaged foods.  ? Food that is high in trans fat, such as fried foods.  ? Food that is high in saturated fat, such as fatty meat.  ? Sweets, desserts, sugary drinks, and other foods with added sugar.  ? Full-fat dairy products.  · Do not salt foods before eating.  · Try to eat at least 2 vegetarian meals each week.  · Eat more home-cooked food and less restaurant, buffet, and fast food.  · When eating at a restaurant, ask that your food be prepared with less salt or no salt, if possible.  What foods are recommended?  The items listed may not be a complete list. Talk with your dietitian about what dietary choices are best for you.  Grains  Whole-grain or whole-wheat bread. Whole-grain or whole-wheat pasta. Brown rice. Oatmeal. Quinoa. Bulgur. Whole-grain and low-sodium cereals. Marline bread. Low-fat, low-sodium crackers. Whole-wheat flour tortillas.  Vegetables  Fresh or frozen vegetables (raw, steamed, roasted, or grilled). Low-sodium or reduced-sodium tomato and vegetable juice. Low-sodium or reduced-sodium tomato sauce and tomato  paste. Low-sodium or reduced-sodium canned vegetables.  Fruits  All fresh, dried, or frozen fruit. Canned fruit in natural juice (without added sugar).  Meat and other protein foods  Skinless chicken or turkey. Ground chicken or turkey. Pork with fat trimmed off. Fish and seafood. Egg whites. Dried beans, peas, or lentils. Unsalted nuts, nut butters, and seeds. Unsalted canned beans. Lean cuts of beef with fat trimmed off. Low-sodium, lean deli meat.  Dairy  Low-fat (1%) or fat-free (skim) milk. Fat-free, low-fat, or reduced-fat cheeses. Nonfat, low-sodium ricotta or cottage cheese. Low-fat or nonfat yogurt. Low-fat, low-sodium cheese.  Fats and oils  Soft margarine without trans fats. Vegetable oil. Low-fat, reduced-fat, or light mayonnaise and salad dressings (reduced-sodium). Canola, safflower, olive, soybean, and sunflower oils. Avocado.  Seasoning and other foods  Herbs. Spices. Seasoning mixes without salt. Unsalted popcorn and pretzels. Fat-free sweets.  What foods are not recommended?  The items listed may not be a complete list. Talk with your dietitian about what dietary choices are best for you.  Grains  Baked goods made with fat, such as croissants, muffins, or some breads. Dry pasta or rice meal packs.  Vegetables  Creamed or fried vegetables. Vegetables in a cheese sauce. Regular canned vegetables (not low-sodium or reduced-sodium). Regular canned tomato sauce and paste (not low-sodium or reduced-sodium). Regular tomato and vegetable juice (not low-sodium or reduced-sodium). Pickles. Olives.  Fruits  Canned fruit in a light or heavy syrup. Fried fruit. Fruit in cream or butter sauce.  Meat and other protein foods  Fatty cuts of meat. Ribs. Fried meat. Chow. Sausage. Bologna and other processed lunch meats. Salami. Fatback. Hotdogs. Bratwurst. Salted nuts and seeds. Canned beans with added salt. Canned or smoked fish. Whole eggs or egg yolks. Chicken or turkey with skin.  Dairy  Whole or 2% milk,  cream, and half-and-half. Whole or full-fat cream cheese. Whole-fat or sweetened yogurt. Full-fat cheese. Nondairy creamers. Whipped toppings. Processed cheese and cheese spreads.  Fats and oils  Butter. Stick margarine. Lard. Shortening. Ghee. Chow fat. Tropical oils, such as coconut, palm kernel, or palm oil.  Seasoning and other foods  Salted popcorn and pretzels. Onion salt, garlic salt, seasoned salt, table salt, and sea salt. Worcestershire sauce. Tartar sauce. Barbecue sauce. Teriyaki sauce. Soy sauce, including reduced-sodium. Steak sauce. Canned and packaged gravies. Fish sauce. Oyster sauce. Cocktail sauce. Horseradish that you find on the shelf. Ketchup. Mustard. Meat flavorings and tenderizers. Bouillon cubes. Hot sauce and Tabasco sauce. Premade or packaged marinades. Premade or packaged taco seasonings. Relishes. Regular salad dressings.  Where to find more information:  · National Heart, Lung, and Blood [place]: www.nhlbi.nih.gov  · American Heart Association: www.heart.org  Summary  · The DASH eating plan is a healthy eating plan that has been shown to reduce high blood pressure (hypertension). It may also reduce your risk for type 2 diabetes, heart disease, and stroke.  · With the DASH eating plan, you should limit salt (sodium) intake to 2,300 mg a day. If you have hypertension, you may need to reduce your sodium intake to 1,500 mg a day.  · When on the DASH eating plan, aim to eat more fresh fruits and vegetables, whole grains, lean proteins, low-fat dairy, and heart-healthy fats.  · Work with your health care provider or diet and nutrition specialist (dietitian) to adjust your eating plan to your individual calorie needs.  This information is not intended to replace advice given to you by your health care provider. Make sure you discuss any questions you have with your health care provider.  Document Released: 12/06/2012 Document Revised: 11/30/2018 Document Reviewed: 12/11/2017  [place]  Patient Education © 2020 [employer]

## 2020-06-02 NOTE — PROGRESS NOTES
Chief complaint:   Chief Complaint   Patient presents with   • Back Pain     Patient is here today for back pain, bilateral leg pain and bilateral numbness and tingling.     Subjective     HPI:   From previous note: 2/10/2020.  Randell Reddy is a 48 y.o. male with a significant medical history of prior discectomies at L3-4 and L4-5 and a laminectomy at L5 and S1 ([facility]), hearing loss, and morbid obesity.  He presents with a new problem of worsening lumbar back and intermittent bilateral lower leg pain, right> left with intermittent numbness and paresthesias.  Physical exam findings of BMI 46.3, bilateral hearing loss with hearing aid devices, 1+ left Achilles reflex, equivocal left plantar reflex, mild left lower extremity muscle atrophy, and inability to maintain gait on heel of left foot.  Strength well-maintained, no Morales's or clonus noted.  His imaging shows congenitally shortened pedicles which results in mild cervical stenosis throughout the lumbar spine.  Additionally he has retrolisthesis of L4 and L5 and nearly complete generation of L5/S1.  Prior laminectomies at L5 and S1.  There is a disc bulge asymmetric to the left at L4/5.     Mechanical Lumbar back pain right right greater than left leg numbness  Congenitally shortened pedicles  Retrolisthesis of L4 on L5  The indications for surgery would include severe stenosis, mechanical instability, or intractable radiculopathy.  Currently Randell has an ADÁN of 40 which suggest moderate disability.  However he still able to maintain employment and is relatively young.  We discussed his imaging and conservative therapy versus further decompressions versus stabilization.  At this time I would favor that Randell pursue conservative therapy in conjunction with aggressive weight management.  I believe that this would result and significant improvement in his low back pain and stave off further surgeries for 5 to 10 years.  His spinal  structure is such that he will most likely require continued surgeries, and these will continue to be additive.  Therefore while he is still maintaining good employment I would like to hold off any further surgeries which could leave him with significant disability.  In the meantime I would like to obtain an MRI of the lumbar spine.  We will give him a small prescription of Valium for sedation during this examination.       I would like to see him back for symptoms check in 12 months.     We will also provide him a small prescription of Celebrex and refill his Flexeril once a day to see if this can aid in his pain management.     Numbness and tingling in the bilateral legs  His EMG is most consistent with chronic L4-S1 radiculopathy.  This is most likely due to moderate stenosis that is worsened in the upright position.  Again I would recommend conservative management at this time.  Should he develop bowel and bladder incontinence, worsening weakness, worsening gait, or permanent sensory distribution or loss then we will proceed with surgical intervention.     Morbid obesity  BMI today is 46.3.  Information on the DASH diet provided in the AVS.  We will continue to provided diet and exercise information with the goal of weight loss at each scheduled appointment.  Today he is in favor of being evaluated by weight management program.  We will refer him to Dr. Knott    Interval History: Randell Reddy is a 49 y.o.  male who presents today for follow-up of his lower back and bilateral leg pain, 70% legs, 30% back.  Mr. Reddy continues to complain of intermittent flareups of lower back pain that radiates down the lateral right leg to the ankle, occasionally extending to the plantar surface of the right foot, as well as intermittent left lateral thigh pain that extends just below the knee.  He additionally reports intermittent numbness and paresthesias in the same distributions.  Currently, no significant back  "or leg pain present.  Overall, his symptoms are relatively unchanged since his last encounter.  He states his back discomfort worsens with prolonged sitting and with physical activity.  Relatively unchanged with standing.  Decreases to some extent with avoidance of aggravating factors, stretching, frequently changing positions, and minimal relief with Lyrica, Celebrex, ibuprofen, and Flexeril [facility].  He denies fevers, chills, night sweats, unexplained weight loss, saddle anesthesia, or bowel or bladder dysfunction.  He currently rates the severity of his symptoms 2/10.  No additional concerns at this time.    Oswestry Disability Index = 22%   Score   Pain Intensity Very mild pain-1   Personal Care Look after myself without pain-0   Lifting Can lift heavy weights with extra pain-1   Walking Pain prevents > 1/4 mile-2   Sitting Sit in \"favorite\" chair as long as I like-1   Standing Pain limits standing to < 1/2 hr-3   Sleeping Occasionally disturbed-1   Sex Life (if applicable) Sex causes extra pain-2   Social Life Social life normal, but increases pain-1   Traveling Travel without pain-0   (Williams et al, 1980)    SCORE INTERPRETATION OF THE OSWESTRY LBP DISABILITY QUESTIONNAIRE     0-20% Minimal disability Can cope with most ADLs. Usually no treatment is needed, apart from advice on lifting, sitting, posture, physical fitness, and diet. In this group,  some patients have particular difficulty with sitting and this may be important if their occupation is sedentary (, , etc.)       20-40% Moderate disability This group experiences more pain and problems with sitting, lifting, and standing. Travel and social life are more difficult and they may well be off  work. Personal care, sexual activity, and sleeping are not grossly affected, and the back condition can usually be managed by conservative means.       40-60% Severe disability Pain remains the main problem in this " group of patients, but travel, personal care, social life, sexual activity, and sleep are also affected.  These patients require detailed investigation.     60-80% Crippled Back pain impinges on all aspects of these patients’ lives both at home and at work. Positive intervention is required.     % These patients are either bed-bound or exaggerating their symptoms. This can be evaluated by careful observation of the patient during the  medical examination.    ROS  Review of Systems   Constitutional: Negative.  Negative for chills, fatigue, fever and unexpected weight change.   HENT: Negative.    Eyes: Negative.    Respiratory: Negative.    Cardiovascular: Negative.    Gastrointestinal: Negative.    Endocrine: Negative.    Genitourinary: Negative.    Musculoskeletal: Positive for back pain.   Skin: Negative.    Allergic/Immunologic: Negative.    Neurological: Positive for numbness.   Hematological: Negative.    Psychiatric/Behavioral: Negative.    All other systems reviewed and are negative.    PFSH:  Past Medical History:   Diagnosis Date   • Injury of back    • Kidney stone      Past Surgical History:   Procedure Laterality Date   • BACK SURGERY     • EXTRACORPOREAL SHOCK WAVE LITHOTRIPSY (ESWL)       Objective      Current Outpatient Medications   Medication Sig Dispense Refill   • celecoxib (CeleBREX) 100 MG capsule Take 100 mg by mouth 2 (Two) Times a Day.     • cyclobenzaprine (FLEXERIL) 10 MG tablet Take 10 mg by mouth 3 (Three) Times a Day As Needed for Muscle Spasms.     • EPINEPHrine (EPIPEN) 0.3 MG/0.3ML solution auto-injector injection Inject 0.3 mg into the appropriate muscle as directed by prescriber 1 (One) Time.     • ibuprofen (ADVIL,MOTRIN) 800 MG tablet      • metFORMIN (GLUCOPHAGE) 1000 MG tablet Take 1,000 mg by mouth 2 (Two) Times a Day With Meals.     • pregabalin (LYRICA) 75 MG capsule Take 75 mg by mouth 2 (Two) Times a Day.     • PROAIR  (90 Base) MCG/ACT inhaler Inhale 1 puff  "Every 4 (Four) Hours As Needed.       No current facility-administered medications for this visit.      Vital Signs  Ht 185.4 cm (73\")   Wt (!) 162 kg (357 lb)   BMI 47.10 kg/m²   Physical Exam   Constitutional: He is oriented to person, place, and time. Vital signs are normal. He appears well-developed and well-nourished. He is cooperative.  Non-toxic appearance. He does not have a sickly appearance. He does not appear ill. No distress.   BMI 47.1   HENT:   Head: Normocephalic and atraumatic.   Right Ear: Hearing normal.   Left Ear: Hearing normal.   Mouth/Throat: Mucous membranes are normal.   Eyes: Pupils are equal, round, and reactive to light. Conjunctivae and EOM are normal.   Neck: Trachea normal and full passive range of motion without pain. Neck supple.   Cardiovascular: Normal rate and regular rhythm.   Pulmonary/Chest: Effort normal. No accessory muscle usage. No apnea, no tachypnea and no bradypnea. No respiratory distress.   Abdominal: Soft. Normal appearance.   Musculoskeletal:   Left lower leg muscle atrophy   Neurological: He is alert and oriented to person, place, and time. Gait normal. GCS eye subscore is 4. GCS verbal subscore is 5. GCS motor subscore is 6.   Reflex Scores:       Tricep reflexes are 2+ on the right side and 2+ on the left side.       Bicep reflexes are 2+ on the right side and 2+ on the left side.       Brachioradialis reflexes are 2+ on the right side and 2+ on the left side.       Patellar reflexes are 2+ on the right side and 2+ on the left side.       Achilles reflexes are 1+ on the right side and 1+ on the left side.  Skin: Skin is warm, dry and intact. He is not diaphoretic.   Psychiatric: He has a normal mood and affect. His speech is normal and behavior is normal.   Nursing note and vitals reviewed.    Neurologic Exam     Mental Status   Oriented to person, place, and time.   Attention: normal. Concentration: normal.   Speech: speech is normal   Level of consciousness: " alert    Cranial Nerves     CN II   Visual fields full to confrontation.     CN III, IV, VI   Pupils are equal, round, and reactive to light.  Extraocular motions are normal.     CN V   Facial sensation intact.     CN VII   Facial expression full, symmetric.     CN VIII   CN VIII normal.     CN IX, X   CN IX normal.     CN XI   CN XI normal.     Motor Exam   Muscle bulk: normal  Overall muscle tone: normal  Right arm tone: normal  Left arm tone: normal  Right arm pronator drift: absent  Left arm pronator drift: absent  Right leg tone: normal  Left leg tone: normal    Strength   Right deltoid: 5/5  Left deltoid: 5/5  Right biceps: 5/5  Left biceps: 5/5  Right triceps: 5/5  Left triceps: 5/5  Right wrist extension: 5/5  Left wrist extension: 5/5  Right iliopsoas: 5/5  Left iliopsoas: 5/5  Right quadriceps: 5/5  Left quadriceps: 5/5  Right anterior tibial: 5/5  Left anterior tibial: 5/5  Right posterior tibial: 5/5  Left posterior tibial: 5/5    Sensory Exam   Right arm light touch: normal  Left arm light touch: normal  Right leg light touch: normal  Left leg light touch: normal    Gait, Coordination, and Reflexes     Gait  Gait: normal    Tremor   Resting tremor: absent  Intention tremor: absent  Action tremor: absent    Reflexes   Right brachioradialis: 2+  Left brachioradialis: 2+  Right biceps: 2+  Left biceps: 2+  Right triceps: 2+  Left triceps: 2+  Right patellar: 2+  Left patellar: 2+  Right achilles: 1+  Left achilles: 1+  Right : 4+  Left : 4+  Right plantar: normal  Left plantar: equivocal  Right Morales: absent  Left Morales: absent  Right ankle clonus: absent  Left ankle clonus: absent  Right pendular knee jerk: absent  Left pendular knee jerk: absent  (12 bullet pts)    Results Review: No new imaging    Assessment/Plan: Randell Reddy is a 49 y.o. male with a significant medical history of prior discectomies at L3-4 and L4-5 and a laminectomy at L5 and S1 ([facility]), hearing  loss, and morbid obesity.  He presents today for follow-up of unresolved and unchanged intermittent lower back and bilateral leg pain, 70% legs, 30% back.  Physical exam findings of BMI 47.1, mild left lower muscle atrophy, 1+ bilateral Achilles reflex, and an equivocal left plantar reflex.  No recent imaging.    Recommendations:  Mechanical Lumbar back pain right right greater than left leg numbness  Congenitally shortened pedicles  Retrolisthesis of L4 on L5  Christbriannaalexis complaints and physical exam findings are relatively unchanged from his prior encounter.  His ADÁN has decreased from 40-20, however still suggesting moderate disability.  In conjunction with Dr. Paz previous note, in regards to his lower back and bilateral leg pain, I recommended he continue conservative management at this time, as well as aggressive weight loss management.  A referral to bariatrics, Dr. Knott provided.  The indications for surgery would include severe stenosis, mechanical instability, or intractable radiculopathy.  We will have him return for reassessment with Dr. Paz in 6 months.  I recommended he call to return sooner for any new or additional concerns, to include weakness, worsening paresthesias, gait or balance instabilities, or bowel bladder dysfunction.  Treatment options discussed in detail with Randell and he voiced understanding.     Numbness and tingling in the bilateral legs  His EMG is most consistent with chronic L4-S1 radiculopathy.  This is most likely due to moderate stenosis that is worsened in the upright position.  Again I would recommend conservative management at this time.  Should he develop bowel and bladder incontinence, worsening weakness, worsening gait, or permanent sensory distribution or loss then we will proceed with surgical intervention.    Obese Class III extreme obesity: > or equal to 40kg/m2  Body mass index is 47.1 kg/m².  Information on the DASH diet provided in the AVS.  We  will continue to provided diet and exercise information with the goal of weight loss at each scheduled appointment.  In the interim, referral to bariatrics, Dr. Knott was provided.    Randell was seen today for back pain.    Diagnoses and all orders for this visit:    Degeneration of lumbar intervertebral disc    Retrolisthesis    Non-tobacco user    Morbid obesity with BMI of 45.0-49.9, adult (CMS/[facility])  -     Ambulatory Referral to Bariatric Surgery      Return for keep scheduled appointment.    Level of Risk: Low due to:  one stable chronic illnesss  MDM: Low Complexity  (Mod = 65423, High = 01687)    Thank you, for allowing me to continue to participate in the care of this patient.    Sincerely,  EMERY Jin

## 2020-07-30 ENCOUNTER — LAB (OUTPATIENT)
Dept: LAB | Facility: HOSPITAL | Age: 50
End: 2020-07-30

## 2020-07-30 ENCOUNTER — TRANSCRIBE ORDERS (OUTPATIENT)
Dept: ADMINISTRATIVE | Facility: HOSPITAL | Age: 50
End: 2020-07-30

## 2020-07-30 DIAGNOSIS — R73.9 BLOOD GLUCOSE ELEVATED: ICD-10-CM

## 2020-07-30 DIAGNOSIS — Z00.00 ROUTINE GENERAL MEDICAL EXAMINATION AT A HEALTH CARE FACILITY: ICD-10-CM

## 2020-07-30 DIAGNOSIS — Z00.00 ROUTINE GENERAL MEDICAL EXAMINATION AT A HEALTH CARE FACILITY: Primary | ICD-10-CM

## 2020-07-30 LAB
ALBUMIN SERPL-MCNC: 4.6 G/DL (ref 3.5–5.2)
ALBUMIN/GLOB SERPL: 1.6 G/DL
ALP SERPL-CCNC: 87 U/L (ref 39–117)
ALT SERPL W P-5'-P-CCNC: 41 U/L (ref 1–41)
ANION GAP SERPL CALCULATED.3IONS-SCNC: 12 MMOL/L (ref 5–15)
AST SERPL-CCNC: 25 U/L (ref 1–40)
BASOPHILS # BLD AUTO: 0.04 10*3/MM3 (ref 0–0.2)
BASOPHILS NFR BLD AUTO: 0.6 % (ref 0–1.5)
BILIRUB SERPL-MCNC: 0.4 MG/DL (ref 0–1.2)
BUN SERPL-MCNC: 13 MG/DL (ref 6–20)
BUN/CREAT SERPL: 16 (ref 7–25)
CALCIUM SPEC-SCNC: 9.4 MG/DL (ref 8.6–10.5)
CHLORIDE SERPL-SCNC: 104 MMOL/L (ref 98–107)
CO2 SERPL-SCNC: 25 MMOL/L (ref 22–29)
CREAT SERPL-MCNC: 0.81 MG/DL (ref 0.76–1.27)
DEPRECATED RDW RBC AUTO: 42.6 FL (ref 37–54)
EOSINOPHIL # BLD AUTO: 0.11 10*3/MM3 (ref 0–0.4)
EOSINOPHIL NFR BLD AUTO: 1.6 % (ref 0.3–6.2)
ERYTHROCYTE [DISTWIDTH] IN BLOOD BY AUTOMATED COUNT: 13.5 % (ref 12.3–15.4)
GFR SERPL CREATININE-BSD FRML MDRD: 102 ML/MIN/1.73
GLOBULIN UR ELPH-MCNC: 2.9 GM/DL
GLUCOSE SERPL-MCNC: 93 MG/DL (ref 65–99)
HCT VFR BLD AUTO: 44.2 % (ref 37.5–51)
HGB BLD-MCNC: 14.6 G/DL (ref 13–17.7)
IMM GRANULOCYTES # BLD AUTO: 0.02 10*3/MM3 (ref 0–0.05)
IMM GRANULOCYTES NFR BLD AUTO: 0.3 % (ref 0–0.5)
LYMPHOCYTES # BLD AUTO: 2.11 10*3/MM3 (ref 0.7–3.1)
LYMPHOCYTES NFR BLD AUTO: 31 % (ref 19.6–45.3)
MCH RBC QN AUTO: 28.8 PG (ref 26.6–33)
MCHC RBC AUTO-ENTMCNC: 33 G/DL (ref 31.5–35.7)
MCV RBC AUTO: 87.2 FL (ref 79–97)
MONOCYTES # BLD AUTO: 0.45 10*3/MM3 (ref 0.1–0.9)
MONOCYTES NFR BLD AUTO: 6.6 % (ref 5–12)
NEUTROPHILS NFR BLD AUTO: 4.07 10*3/MM3 (ref 1.7–7)
NEUTROPHILS NFR BLD AUTO: 59.9 % (ref 42.7–76)
NRBC BLD AUTO-RTO: 0 /100 WBC (ref 0–0.2)
PLATELET # BLD AUTO: 194 10*3/MM3 (ref 140–450)
PMV BLD AUTO: 10.5 FL (ref 6–12)
POTASSIUM SERPL-SCNC: 4.2 MMOL/L (ref 3.5–5.2)
PROT SERPL-MCNC: 7.5 G/DL (ref 6–8.5)
RBC # BLD AUTO: 5.07 10*6/MM3 (ref 4.14–5.8)
SODIUM SERPL-SCNC: 141 MMOL/L (ref 136–145)
WBC # BLD AUTO: 6.8 10*3/MM3 (ref 3.4–10.8)

## 2020-07-30 PROCEDURE — 81003 URINALYSIS AUTO W/O SCOPE: CPT | Performed by: NURSE PRACTITIONER

## 2020-07-30 PROCEDURE — 85025 COMPLETE CBC W/AUTO DIFF WBC: CPT | Performed by: NURSE PRACTITIONER

## 2020-07-30 PROCEDURE — 83036 HEMOGLOBIN GLYCOSYLATED A1C: CPT | Performed by: NURSE PRACTITIONER

## 2020-07-30 PROCEDURE — 80061 LIPID PANEL: CPT | Performed by: NURSE PRACTITIONER

## 2020-07-30 PROCEDURE — 80053 COMPREHEN METABOLIC PANEL: CPT | Performed by: NURSE PRACTITIONER

## 2020-07-30 PROCEDURE — 36415 COLL VENOUS BLD VENIPUNCTURE: CPT

## 2020-07-31 LAB
BILIRUB UR QL STRIP: NEGATIVE
CHOLEST SERPL-MCNC: 174 MG/DL (ref 0–200)
CLARITY UR: CLEAR
COLOR UR: YELLOW
GLUCOSE UR STRIP-MCNC: NEGATIVE MG/DL
HBA1C MFR BLD: 6.4 % (ref 4.8–5.6)
HDLC SERPL-MCNC: 34 MG/DL (ref 40–60)
HGB UR QL STRIP.AUTO: NEGATIVE
KETONES UR QL STRIP: NEGATIVE
LDLC SERPL CALC-MCNC: 108 MG/DL (ref 0–100)
LDLC/HDLC SERPL: 3.18 {RATIO}
LEUKOCYTE ESTERASE UR QL STRIP.AUTO: NEGATIVE
NITRITE UR QL STRIP: NEGATIVE
PH UR STRIP.AUTO: <=5 [PH] (ref 5–8)
PROT UR QL STRIP: NEGATIVE
SP GR UR STRIP: 1.02 (ref 1–1.03)
TRIGL SERPL-MCNC: 159 MG/DL (ref 0–150)
UROBILINOGEN UR QL STRIP: NORMAL
VLDLC SERPL-MCNC: 31.8 MG/DL (ref 5–40)

## 2021-01-10 PROCEDURE — 87635 SARS-COV-2 COVID-19 AMP PRB: CPT | Performed by: NURSE PRACTITIONER

## 2021-01-12 ENCOUNTER — NURSE TRIAGE (OUTPATIENT)
Dept: CALL CENTER | Facility: HOSPITAL | Age: 51
End: 2021-01-12

## 2021-01-13 NOTE — TELEPHONE ENCOUNTER
Reason for Disposition  • [1] COVID-19 diagnosed by positive lab test AND [2] mild symptoms (e.g., cough, fever, others) AND [3] no complications or SOB    Additional Information  • Negative: SEVERE difficulty breathing (e.g., struggling for each breath, speaks in single words)  • Negative: Difficult to awaken or acting confused (e.g., disoriented, slurred speech)  • Negative: Bluish (or gray) lips or face now  • Negative: Shock suspected (e.g., cold/pale/clammy skin, too weak to stand, low BP, rapid pulse)  • Negative: Sounds like a life-threatening emergency to the triager  • Negative: [1] COVID-19 exposure AND [2] no symptoms  • Negative: [1] Lives with someone known to have influenza (flu test positive) AND [2] flu-like symptoms (e.g., cough, runny nose, sore throat, SOB; with or without fever)  • Negative: [1] Adult with possible COVID-19 symptoms AND [2] triager concerned about severity of symptoms or other causes  • Negative: Immunization reaction suspected (e.g., fever, headache, muscle aches occurring during days 1-3 days after immunization)  • Negative: COVID-19 and breastfeeding, questions about  • Negative: SEVERE or constant chest pain or pressure (Exception: mild central chest pain, present only when coughing)  • Negative: MODERATE difficulty breathing (e.g., speaks in phrases, SOB even at rest, pulse 100-120)  • Negative: [1] Headache AND [2] stiff neck (can't touch chin to chest)  • Negative: MILD difficulty breathing (e.g., minimal/no SOB at rest, SOB with walking, pulse <100)  • Negative: Chest pain or pressure  • Negative: Patient sounds very sick or weak to the triager  • Negative: Fever > 103 F (39.4 C)  • Negative: [1] Fever > 101 F (38.3 C) AND [2] age > 60  • Negative: [1] Fever > 100.0 F (37.8 C) AND [2] bedridden (e.g., nursing home patient, CVA, chronic illness, recovering from surgery)  • Negative: [1] HIGH RISK patient (e.g., age > 64 years, diabetes, heart or lung disease, weak  "immune system) AND [2] new or worsening symptoms  • Negative: [1] HIGH RISK patient AND [2] influenza is widespread in the community AND [3] ONE OR MORE respiratory symptoms: cough, sore throat, runny or stuffy nose  • Negative: [1] HIGH RISK patient AND [2] influenza exposure within the last 7 days AND [3] ONE OR MORE respiratory symptoms: cough, sore throat, runny or stuffy nose  • Negative: Fever present > 3 days (72 hours)  • Negative: [1] Fever returns after gone for over 24 hours AND [2] symptoms worse or not improved  • Negative: [1] Continuous (nonstop) coughing interferes with work or school AND [2] no improvement using cough treatment per protocol  • Negative: [1] COVID-19 infection suspected by caller or triager AND [2] mild symptoms (cough, fever, or others) AND [3] no complications or SOB  • Negative: Cough present > 3 weeks    Answer Assessment - Initial Assessment Questions  1. COVID-19 DIAGNOSIS: \"Who made your Coronavirus (COVID-19) diagnosis?\" \"Was it confirmed by a positive lab test?\" If not diagnosed by a HCP, ask \"Are there lots of cases (community spread) where you live?\" (See public health department website, if unsure)      Clinic; positive  2. COVID-19 EXPOSURE: \"Was there any known exposure to COVID before the symptoms began?\" CDC Definition of close contact: within 6 feet (2 meters) for a total of 15 minutes or more over a 24-hour period.       no  3. ONSET: \"When did the COVID-19 symptoms start?\"       Saturday  4. WORST SYMPTOM: \"What is your worst symptom?\" (e.g., cough, fever, shortness of breath, muscle aches)      fever  5. COUGH: \"Do you have a cough?\" If so, ask: \"How bad is the cough?\"        mild  6. FEVER: \"Do you have a fever?\" If so, ask: \"What is your temperature, how was it measured, and when did it start?\"      Yes; 101  7. RESPIRATORY STATUS: \"Describe your breathing?\" (e.g., shortness of breath, wheezing, unable to speak)       normal  8. BETTER-SAME-WORSE: \"Are you " "getting better, staying the same or getting worse compared to yesterday?\"  If getting worse, ask, \"In what way?\"      worse  9. HIGH RISK DISEASE: \"Do you have any chronic medical problems?\" (e.g., asthma, heart or lung disease, weak immune system, obesity, etc.)      no  10. PREGNANCY: \"Is there any chance you are pregnant?\" \"When was your last menstrual period?\"        na  11. OTHER SYMPTOMS: \"Do you have any other symptoms?\"  (e.g., chills, fatigue, headache, loss of smell or taste, muscle pain, sore throat; new loss of smell or taste especially support the diagnosis of COVID-19)        Fatigue, headache, muscle aches, congestion    Protocols used: CORONAVIRUS (COVID-19) DIAGNOSED OR SUSPECTED-ADULT-AH      "

## 2021-01-14 ENCOUNTER — HOSPITAL ENCOUNTER (EMERGENCY)
Facility: HOSPITAL | Age: 51
Discharge: HOME | End: 2021-01-14
Attending: EMERGENCY MEDICINE | Admitting: EMERGENCY MEDICINE

## 2021-01-14 ENCOUNTER — APPOINTMENT (OUTPATIENT)
Dept: GENERAL RADIOLOGY | Facility: HOSPITAL | Age: 51
End: 2021-01-14

## 2021-01-14 VITALS
HEART RATE: 101 BPM | RESPIRATION RATE: 22 BRPM | OXYGEN SATURATION: 90 % | BODY MASS INDEX: 40.43 KG/M2 | WEIGHT: 315 LBS | SYSTOLIC BLOOD PRESSURE: 159 MMHG | HEIGHT: 74 IN | TEMPERATURE: 99.9 F | DIASTOLIC BLOOD PRESSURE: 95 MMHG

## 2021-01-14 DIAGNOSIS — U07.1 COVID-19: Primary | ICD-10-CM

## 2021-01-14 LAB
ALBUMIN SERPL-MCNC: 4 G/DL (ref 3.5–5.2)
ALBUMIN/GLOB SERPL: 1.1 G/DL
ALP SERPL-CCNC: 96 U/L (ref 39–117)
ALT SERPL W P-5'-P-CCNC: 36 U/L (ref 1–41)
ANION GAP SERPL CALCULATED.3IONS-SCNC: 14 MMOL/L (ref 5–15)
AST SERPL-CCNC: 24 U/L (ref 1–40)
BASOPHILS # BLD AUTO: 0.03 10*3/MM3 (ref 0–0.2)
BASOPHILS NFR BLD AUTO: 0.6 % (ref 0–1.5)
BILIRUB SERPL-MCNC: 0.3 MG/DL (ref 0–1.2)
BUN SERPL-MCNC: 14 MG/DL (ref 6–20)
BUN/CREAT SERPL: 15.2 (ref 7–25)
CALCIUM SPEC-SCNC: 8.9 MG/DL (ref 8.6–10.5)
CHLORIDE SERPL-SCNC: 100 MMOL/L (ref 98–107)
CO2 SERPL-SCNC: 23 MMOL/L (ref 22–29)
CREAT SERPL-MCNC: 0.92 MG/DL (ref 0.76–1.27)
D DIMER PPP FEU-MCNC: 0.32 MG/L (FEU) (ref 0–0.5)
D-LACTATE SERPL-SCNC: 1.4 MMOL/L (ref 0.5–2)
DEPRECATED RDW RBC AUTO: 43.1 FL (ref 37–54)
EOSINOPHIL # BLD AUTO: 0.01 10*3/MM3 (ref 0–0.4)
EOSINOPHIL NFR BLD AUTO: 0.2 % (ref 0.3–6.2)
ERYTHROCYTE [DISTWIDTH] IN BLOOD BY AUTOMATED COUNT: 13.9 % (ref 12.3–15.4)
GFR SERPL CREATININE-BSD FRML MDRD: 87 ML/MIN/1.73
GLOBULIN UR ELPH-MCNC: 3.7 GM/DL
GLUCOSE SERPL-MCNC: 99 MG/DL (ref 65–99)
HCT VFR BLD AUTO: 46.7 % (ref 37.5–51)
HGB BLD-MCNC: 16.4 G/DL (ref 13–17.7)
HOLD SPECIMEN: NORMAL
HOLD SPECIMEN: NORMAL
LYMPHOCYTES # BLD AUTO: 1.53 10*3/MM3 (ref 0.7–3.1)
LYMPHOCYTES NFR BLD AUTO: 28.8 % (ref 19.6–45.3)
MCH RBC QN AUTO: 29.9 PG (ref 26.6–33)
MCHC RBC AUTO-ENTMCNC: 35.1 G/DL (ref 31.5–35.7)
MCV RBC AUTO: 85.2 FL (ref 79–97)
MONOCYTES # BLD AUTO: 0.5 10*3/MM3 (ref 0.1–0.9)
MONOCYTES NFR BLD AUTO: 9.4 % (ref 5–12)
NEUTROPHILS NFR BLD AUTO: 3.23 10*3/MM3 (ref 1.7–7)
NEUTROPHILS NFR BLD AUTO: 60.6 % (ref 42.7–76)
NT-PROBNP SERPL-MCNC: <5 PG/ML (ref 0–450)
PLATELET # BLD AUTO: 139 10*3/MM3 (ref 140–450)
PMV BLD AUTO: 10.4 FL (ref 6–12)
POTASSIUM SERPL-SCNC: 4.2 MMOL/L (ref 3.5–5.2)
PROT SERPL-MCNC: 7.7 G/DL (ref 6–8.5)
RBC # BLD AUTO: 5.48 10*6/MM3 (ref 4.14–5.8)
SODIUM SERPL-SCNC: 137 MMOL/L (ref 136–145)
TROPONIN T SERPL-MCNC: <0.01 NG/ML (ref 0–0.03)
WBC # BLD AUTO: 5.32 10*3/MM3 (ref 3.4–10.8)
WHOLE BLOOD HOLD SPECIMEN: NORMAL
WHOLE BLOOD HOLD SPECIMEN: NORMAL

## 2021-01-14 PROCEDURE — 63710000001 ONDANSETRON ODT 4 MG TABLET DISPERSIBLE: Performed by: EMERGENCY MEDICINE

## 2021-01-14 PROCEDURE — 84484 ASSAY OF TROPONIN QUANT: CPT | Performed by: EMERGENCY MEDICINE

## 2021-01-14 PROCEDURE — 83880 ASSAY OF NATRIURETIC PEPTIDE: CPT | Performed by: EMERGENCY MEDICINE

## 2021-01-14 PROCEDURE — 80053 COMPREHEN METABOLIC PANEL: CPT | Performed by: EMERGENCY MEDICINE

## 2021-01-14 PROCEDURE — 83605 ASSAY OF LACTIC ACID: CPT | Performed by: EMERGENCY MEDICINE

## 2021-01-14 PROCEDURE — 85025 COMPLETE CBC W/AUTO DIFF WBC: CPT | Performed by: EMERGENCY MEDICINE

## 2021-01-14 PROCEDURE — 85379 FIBRIN DEGRADATION QUANT: CPT | Performed by: EMERGENCY MEDICINE

## 2021-01-14 PROCEDURE — 71045 X-RAY EXAM CHEST 1 VIEW: CPT

## 2021-01-14 PROCEDURE — 87040 BLOOD CULTURE FOR BACTERIA: CPT | Performed by: EMERGENCY MEDICINE

## 2021-01-14 PROCEDURE — 99284 EMERGENCY DEPT VISIT MOD MDM: CPT

## 2021-01-14 RX ORDER — EPINEPHRINE 1 MG/ML
0.3 INJECTION, SOLUTION, CONCENTRATE INTRAVENOUS AS NEEDED
Status: CANCELLED | OUTPATIENT
Start: 2021-01-15

## 2021-01-14 RX ORDER — ONDANSETRON 2 MG/ML
4 INJECTION INTRAMUSCULAR; INTRAVENOUS ONCE
Status: DISCONTINUED | OUTPATIENT
Start: 2021-01-14 | End: 2021-01-14

## 2021-01-14 RX ORDER — SODIUM CHLORIDE 9 MG/ML
250 INJECTION, SOLUTION INTRAVENOUS ONCE
Status: CANCELLED | OUTPATIENT
Start: 2021-01-15

## 2021-01-14 RX ORDER — SODIUM CHLORIDE 0.9 % (FLUSH) 0.9 %
10 SYRINGE (ML) INJECTION AS NEEDED
Status: DISCONTINUED | OUTPATIENT
Start: 2021-01-14 | End: 2021-01-15 | Stop reason: HOSPADM

## 2021-01-14 RX ORDER — METHYLPREDNISOLONE SODIUM SUCCINATE 125 MG/2ML
125 INJECTION, POWDER, LYOPHILIZED, FOR SOLUTION INTRAMUSCULAR; INTRAVENOUS AS NEEDED
Status: CANCELLED | OUTPATIENT
Start: 2021-01-15

## 2021-01-14 RX ORDER — ACETAMINOPHEN 500 MG
1000 TABLET ORAL ONCE
Status: COMPLETED | OUTPATIENT
Start: 2021-01-14 | End: 2021-01-14

## 2021-01-14 RX ORDER — ONDANSETRON 4 MG/1
4 TABLET, ORALLY DISINTEGRATING ORAL ONCE
Status: COMPLETED | OUTPATIENT
Start: 2021-01-14 | End: 2021-01-14

## 2021-01-14 RX ORDER — DIPHENHYDRAMINE HYDROCHLORIDE 50 MG/ML
50 INJECTION INTRAMUSCULAR; INTRAVENOUS AS NEEDED
Status: CANCELLED | OUTPATIENT
Start: 2021-01-15

## 2021-01-14 RX ADMIN — ACETAMINOPHEN 1000 MG: 500 TABLET, FILM COATED ORAL at 22:45

## 2021-01-14 RX ADMIN — ONDANSETRON 4 MG: 4 TABLET, ORALLY DISINTEGRATING ORAL at 21:55

## 2021-01-15 ENCOUNTER — HOSPITAL ENCOUNTER (OUTPATIENT)
Dept: INFUSION THERAPY | Facility: HOSPITAL | Age: 51
Discharge: HOME | End: 2021-01-15
Admitting: INTERNAL MEDICINE

## 2021-01-15 ENCOUNTER — HOSPITAL ENCOUNTER (EMERGENCY)
Facility: HOSPITAL | Age: 51
Discharge: HOME | End: 2021-01-15
Attending: EMERGENCY MEDICINE | Admitting: EMERGENCY MEDICINE

## 2021-01-15 ENCOUNTER — EPISODE CHANGES (OUTPATIENT)
Dept: CASE MANAGEMENT | Facility: OTHER | Age: 51
End: 2021-01-15

## 2021-01-15 VITALS
TEMPERATURE: 98.9 F | SYSTOLIC BLOOD PRESSURE: 138 MMHG | RESPIRATION RATE: 16 BRPM | OXYGEN SATURATION: 98 % | HEART RATE: 89 BPM | DIASTOLIC BLOOD PRESSURE: 83 MMHG

## 2021-01-15 VITALS
WEIGHT: 315 LBS | BODY MASS INDEX: 40.43 KG/M2 | HEART RATE: 97 BPM | HEIGHT: 74 IN | OXYGEN SATURATION: 95 % | RESPIRATION RATE: 18 BRPM | SYSTOLIC BLOOD PRESSURE: 130 MMHG | TEMPERATURE: 99.9 F | DIASTOLIC BLOOD PRESSURE: 79 MMHG

## 2021-01-15 DIAGNOSIS — U07.1 COVID-19: Primary | ICD-10-CM

## 2021-01-15 LAB
ALBUMIN SERPL-MCNC: 4.2 G/DL (ref 3.5–5.2)
ALBUMIN/GLOB SERPL: 1.1 G/DL
ALP SERPL-CCNC: 97 U/L (ref 39–117)
ALT SERPL W P-5'-P-CCNC: 32 U/L (ref 1–41)
ANION GAP SERPL CALCULATED.3IONS-SCNC: 12 MMOL/L (ref 5–15)
AST SERPL-CCNC: 24 U/L (ref 1–40)
BASOPHILS # BLD AUTO: 0.02 10*3/MM3 (ref 0–0.2)
BASOPHILS NFR BLD AUTO: 0.3 % (ref 0–1.5)
BILIRUB SERPL-MCNC: 0.4 MG/DL (ref 0–1.2)
BUN SERPL-MCNC: 17 MG/DL (ref 6–20)
BUN/CREAT SERPL: 20 (ref 7–25)
CALCIUM SPEC-SCNC: 8.9 MG/DL (ref 8.6–10.5)
CHLORIDE SERPL-SCNC: 100 MMOL/L (ref 98–107)
CO2 SERPL-SCNC: 25 MMOL/L (ref 22–29)
CREAT SERPL-MCNC: 0.85 MG/DL (ref 0.76–1.27)
D-LACTATE SERPL-SCNC: 1.8 MMOL/L (ref 0.5–2)
DEPRECATED RDW RBC AUTO: 43 FL (ref 37–54)
EOSINOPHIL # BLD AUTO: 0.02 10*3/MM3 (ref 0–0.4)
EOSINOPHIL NFR BLD AUTO: 0.3 % (ref 0.3–6.2)
ERYTHROCYTE [DISTWIDTH] IN BLOOD BY AUTOMATED COUNT: 13.8 % (ref 12.3–15.4)
GFR SERPL CREATININE-BSD FRML MDRD: 96 ML/MIN/1.73
GLOBULIN UR ELPH-MCNC: 3.7 GM/DL
GLUCOSE SERPL-MCNC: 123 MG/DL (ref 65–99)
HCT VFR BLD AUTO: 46.8 % (ref 37.5–51)
HGB BLD-MCNC: 16.6 G/DL (ref 13–17.7)
HOLD SPECIMEN: NORMAL
IMM GRANULOCYTES # BLD AUTO: 0.02 10*3/MM3 (ref 0–0.05)
IMM GRANULOCYTES NFR BLD AUTO: 0.3 % (ref 0–0.5)
LYMPHOCYTES # BLD AUTO: 1.22 10*3/MM3 (ref 0.7–3.1)
LYMPHOCYTES NFR BLD AUTO: 19.1 % (ref 19.6–45.3)
MCH RBC QN AUTO: 30.1 PG (ref 26.6–33)
MCHC RBC AUTO-ENTMCNC: 35.5 G/DL (ref 31.5–35.7)
MCV RBC AUTO: 84.9 FL (ref 79–97)
MONOCYTES # BLD AUTO: 0.38 10*3/MM3 (ref 0.1–0.9)
MONOCYTES NFR BLD AUTO: 6 % (ref 5–12)
NEUTROPHILS NFR BLD AUTO: 4.72 10*3/MM3 (ref 1.7–7)
NEUTROPHILS NFR BLD AUTO: 74 % (ref 42.7–76)
NRBC BLD AUTO-RTO: 0 /100 WBC (ref 0–0.2)
PLATELET # BLD AUTO: 125 10*3/MM3 (ref 140–450)
PMV BLD AUTO: 10.5 FL (ref 6–12)
POTASSIUM SERPL-SCNC: 4.2 MMOL/L (ref 3.5–5.2)
PROT SERPL-MCNC: 7.9 G/DL (ref 6–8.5)
RBC # BLD AUTO: 5.51 10*6/MM3 (ref 4.14–5.8)
SODIUM SERPL-SCNC: 137 MMOL/L (ref 136–145)
WBC # BLD AUTO: 6.38 10*3/MM3 (ref 3.4–10.8)
WHOLE BLOOD HOLD SPECIMEN: NORMAL
WHOLE BLOOD HOLD SPECIMEN: NORMAL

## 2021-01-15 PROCEDURE — 25010000006 CASIRIVIMAB 1332 MG/11.1ML SOLUTION 11.1 ML VIAL: Performed by: INTERNAL MEDICINE

## 2021-01-15 PROCEDURE — 83605 ASSAY OF LACTIC ACID: CPT | Performed by: EMERGENCY MEDICINE

## 2021-01-15 PROCEDURE — 93010 ELECTROCARDIOGRAM REPORT: CPT | Performed by: EMERGENCY MEDICINE

## 2021-01-15 PROCEDURE — 85025 COMPLETE CBC W/AUTO DIFF WBC: CPT | Performed by: EMERGENCY MEDICINE

## 2021-01-15 PROCEDURE — 80053 COMPREHEN METABOLIC PANEL: CPT | Performed by: EMERGENCY MEDICINE

## 2021-01-15 PROCEDURE — M0243 CASIRIVI AND IMDEVI INFUSION: HCPCS | Performed by: INTERNAL MEDICINE

## 2021-01-15 PROCEDURE — 93005 ELECTROCARDIOGRAM TRACING: CPT | Performed by: EMERGENCY MEDICINE

## 2021-01-15 PROCEDURE — 99284 EMERGENCY DEPT VISIT MOD MDM: CPT

## 2021-01-15 PROCEDURE — 25010000006 IMDEVIMAB 300 MG/2.5ML SOLUTION 2.5 ML VIAL: Performed by: INTERNAL MEDICINE

## 2021-01-15 PROCEDURE — 87040 BLOOD CULTURE FOR BACTERIA: CPT | Performed by: EMERGENCY MEDICINE

## 2021-01-15 RX ORDER — DIPHENHYDRAMINE HYDROCHLORIDE 50 MG/ML
50 INJECTION INTRAMUSCULAR; INTRAVENOUS AS NEEDED
Status: DISCONTINUED | OUTPATIENT
Start: 2021-01-15 | End: 2021-01-17 | Stop reason: HOSPADM

## 2021-01-15 RX ORDER — EPINEPHRINE 1 MG/ML
0.3 INJECTION, SOLUTION, CONCENTRATE INTRAVENOUS AS NEEDED
Status: CANCELLED | OUTPATIENT
Start: 2021-01-15

## 2021-01-15 RX ORDER — DIPHENHYDRAMINE HYDROCHLORIDE 50 MG/ML
50 INJECTION INTRAMUSCULAR; INTRAVENOUS AS NEEDED
Status: CANCELLED | OUTPATIENT
Start: 2021-01-15

## 2021-01-15 RX ORDER — SODIUM CHLORIDE 0.9 % (FLUSH) 0.9 %
10 SYRINGE (ML) INJECTION AS NEEDED
Status: DISCONTINUED | OUTPATIENT
Start: 2021-01-15 | End: 2021-01-16 | Stop reason: HOSPADM

## 2021-01-15 RX ORDER — METHYLPREDNISOLONE SODIUM SUCCINATE 125 MG/2ML
125 INJECTION, POWDER, LYOPHILIZED, FOR SOLUTION INTRAMUSCULAR; INTRAVENOUS AS NEEDED
Status: DISCONTINUED | OUTPATIENT
Start: 2021-01-15 | End: 2021-01-17 | Stop reason: HOSPADM

## 2021-01-15 RX ORDER — METHYLPREDNISOLONE SODIUM SUCCINATE 125 MG/2ML
125 INJECTION, POWDER, LYOPHILIZED, FOR SOLUTION INTRAMUSCULAR; INTRAVENOUS AS NEEDED
Status: CANCELLED | OUTPATIENT
Start: 2021-01-15

## 2021-01-15 RX ORDER — SODIUM CHLORIDE 9 MG/ML
250 INJECTION, SOLUTION INTRAVENOUS ONCE
Status: COMPLETED | OUTPATIENT
Start: 2021-01-15 | End: 2021-01-15

## 2021-01-15 RX ORDER — SODIUM CHLORIDE 9 MG/ML
250 INJECTION, SOLUTION INTRAVENOUS ONCE
Status: CANCELLED | OUTPATIENT
Start: 2021-01-15

## 2021-01-15 RX ORDER — EPINEPHRINE 1 MG/ML
0.3 INJECTION, SOLUTION, CONCENTRATE INTRAVENOUS AS NEEDED
Status: DISCONTINUED | OUTPATIENT
Start: 2021-01-15 | End: 2021-01-17 | Stop reason: HOSPADM

## 2021-01-15 RX ORDER — ACETAMINOPHEN 500 MG
1000 TABLET ORAL ONCE
Status: COMPLETED | OUTPATIENT
Start: 2021-01-15 | End: 2021-01-15

## 2021-01-15 RX ADMIN — SODIUM CHLORIDE 1000 ML: 9 INJECTION, SOLUTION INTRAVENOUS at 20:48

## 2021-01-15 RX ADMIN — ACETAMINOPHEN 1000 MG: 500 TABLET, FILM COATED ORAL at 21:46

## 2021-01-15 RX ADMIN — SODIUM CHLORIDE 250 ML: 9 INJECTION, SOLUTION INTRAVENOUS at 13:30

## 2021-01-15 RX ADMIN — IMDEVIMAB: 300 INJECTION, SOLUTION, CONCENTRATE INTRAVENOUS at 13:30

## 2021-01-15 NOTE — ED PROVIDER NOTES
Subjective   Patient presents with a complaint of fever that was as high as 103.5 the day at home.  He says he started having some cough and congestion on Saturday night on 9 January.  He was tested positive for Covid on 10 January.  Since that time he has had general malaise and some low-grade fever had some symptom given headaches and cough.  His doctor called him in some Fioricet for the headaches.  Today he ran fever as high as 103.5 and had one episode where his pulse ox dropped into the 80s so his family thought he should be checked out.      History provided by:  Patient   used: No    Fever  Max temp prior to arrival:  103.5  Temp source:  Oral  Severity:  Severe  Onset quality:  Gradual  Duration:  4 days  Timing:  Constant  Progression:  Worsening  Chronicity:  New  Relieved by:  Acetaminophen  Worsened by:  Nothing  Ineffective treatments:  None tried  Associated symptoms: chills, cough, headaches and myalgias    Associated symptoms: no chest pain, no confusion, no congestion, no diarrhea, no dysuria, no ear pain, no nausea, no rash, no rhinorrhea, no somnolence, no sore throat and no vomiting    Risk factors: sick contacts    Risk factors: no contaminated food, no contaminated water, no hx of cancer, no immunosuppression, no occupational exposure, no recent sickness and no recent travel        Review of Systems   Constitutional: Positive for chills and fever.   HENT: Negative for congestion, ear pain, rhinorrhea and sore throat.    Respiratory: Positive for cough.    Cardiovascular: Negative.  Negative for chest pain.   Gastrointestinal: Negative.  Negative for diarrhea, nausea and vomiting.   Genitourinary: Negative.  Negative for dysuria.   Musculoskeletal: Positive for myalgias.   Skin: Negative.  Negative for rash.   Neurological: Positive for headaches.   Psychiatric/Behavioral: Negative.  Negative for confusion.   All other systems reviewed and are negative.      Past Medical  History:   Diagnosis Date   • Injury of back    • Kidney stone        No Known Allergies    Past Surgical History:   Procedure Laterality Date   • BACK SURGERY      prior discectomies at L3-4 and L4-5 and a laminectomy at L5 and S1 ([facility])   • EXTRACORPOREAL SHOCK WAVE LITHOTRIPSY ([facility])         Family History   Problem Relation Age of Onset   • No Known Problems Father    • No Known Problems Mother        Social History     Socioeconomic History   • Marital status:      Spouse name: Not on file   • Number of children: Not on file   • Years of education: Not on file   • Highest education level: Not on file   Tobacco Use   • Smoking status: Never Smoker   • Smokeless tobacco: Never Used   Substance and Sexual Activity   • Alcohol use: No     Frequency: Never   • Drug use: No       Prior to Admission medications    Medication Sig Start Date End Date Taking? Authorizing Provider   cyclobenzaprine (FLEXERIL) 10 MG tablet Take 10 mg by mouth 3 (Three) Times a Day As Needed for Muscle Spasms.    ProviderTianna MD   EPINEPHrine (EPIPEN) 0.3 MG/0.3ML solution auto-injector injection Inject 0.3 mg into the appropriate muscle as directed by prescriber 1 (One) Time. 7/26/19   Tianna Goodrich MD   ibuprofen (ADVIL,MOTRIN) 800 MG tablet  5/11/20   Tianna Goodrich MD   metFORMIN (GLUCOPHAGE) 1000 MG tablet Take 1,000 mg by mouth 2 (Two) Times a Day With Meals. 7/20/19   Tianna Goodrich MD   PROAIR  (90 Base) MCG/ACT inhaler Inhale 1 puff Every 4 (Four) Hours As Needed. 9/20/18   Tianna Goodrich MD       Medications   sodium chloride 0.9 % flush 10 mL (has no administration in time range)   ondansetron ODT (ZOFRAN-ODT) disintegrating tablet 4 mg (4 mg Oral Given 1/14/21 2155)   acetaminophen (TYLENOL) tablet 1,000 mg (1,000 mg Oral Given 1/14/21 2245)       Vitals:    01/14/21 2241   BP:    Pulse:    Resp:    Temp: (!) 101.6 °F (38.7 °C)   SpO2:          Objective    Physical Exam  Vitals signs and nursing note reviewed.   Constitutional:       Appearance: Normal appearance.   HENT:      Head: Normocephalic.      Mouth/Throat:      Mouth: Mucous membranes are moist.      Pharynx: Oropharynx is clear.   Neck:      Musculoskeletal: Normal range of motion and neck supple.   Cardiovascular:      Rate and Rhythm: Normal rate and regular rhythm.   Pulmonary:      Effort: Pulmonary effort is normal.   Abdominal:      General: Bowel sounds are normal.      Palpations: Abdomen is soft.      Tenderness: There is no abdominal tenderness.   Musculoskeletal: Normal range of motion.   Skin:     General: Skin is warm and dry.   Neurological:      General: No focal deficit present.      Mental Status: He is alert and oriented to person, place, and time.   Psychiatric:         Mood and Affect: Mood normal.         Behavior: Behavior normal.         Procedures         Lab Results (last 24 hours)     Procedure Component Value Units Date/Time    CBC & Differential [[id number]]  (Abnormal) Collected: 01/14/21 2130    Specimen: Blood from Arm, Left Updated: 01/14/21 2147    Narrative:      The following orders were created for panel order CBC & Differential.  Procedure                               Abnormality         Status                     ---------                               -----------         ------                     CBC Auto Differential[[id number]]        Abnormal            Final result                 Please view results for these tests on the individual orders.    Blood Culture - Blood, Arm, Left [[id number]] Collected: 01/14/21 2130    Specimen: Blood from Arm, Left Updated: 01/14/21 2147    Lactic Acid, Plasma [[id number]]  (Normal) Collected: 01/14/21 2130    Specimen: Blood from Arm, Left Updated: 01/14/21 2158     Lactate 1.4 mmol/L     CBC Auto Differential [[id number]]  (Abnormal) Collected: 01/14/21 2130    Specimen: Blood from Arm, Left Updated: 01/14/21 2147     WBC 5.32  10*3/mm3      RBC 5.48 10*6/mm3      Hemoglobin 16.4 g/dL      Hematocrit 46.7 %      MCV 85.2 fL      MCH 29.9 pg      MCHC 35.1 g/dL      RDW 13.9 %      RDW-SD 43.1 fl      MPV 10.4 fL      Platelets 139 10*3/mm3      Neutrophil % 60.6 %      Lymphocyte % 28.8 %      Monocyte % 9.4 %      Eosinophil % 0.2 %      Basophil % 0.6 %      Neutrophils, Absolute 3.23 10*3/mm3      Lymphocytes, Absolute 1.53 10*3/mm3      Monocytes, Absolute 0.50 10*3/mm3      Eosinophils, Absolute 0.01 10*3/mm3      Basophils, Absolute 0.03 10*3/mm3     D-dimer, Quantitative [[id number]]  (Normal) Collected: 01/14/21 2138    Specimen: Blood Updated: 01/14/21 2153     D-Dimer, Quantitative 0.32 mg/L (FEU)     Narrative:      Reference Range is 0-0.50 mg/L FEU. However, results <0.50 mg/L FEU tends to rule out DVT or PE. Results >0.50 mg/L FEU are not useful in predicting absence or presence of DVT or PE.      Blood Culture - Blood, Arm, Right [[id number]] Collected: 01/14/21 2140    Specimen: Blood from Arm, Right Updated: 01/14/21 2147    Comprehensive Metabolic Panel [[id number]] Collected: 01/14/21 2230    Specimen: Blood from Arm, Left Updated: 01/14/21 2301     Glucose 99 mg/dL      BUN 14 mg/dL      Creatinine 0.92 mg/dL      Sodium 137 mmol/L      Potassium 4.2 mmol/L      Chloride 100 mmol/L      CO2 23.0 mmol/L      Calcium 8.9 mg/dL      Total Protein 7.7 g/dL      Albumin 4.00 g/dL      ALT (SGPT) 36 U/L      AST (SGOT) 24 U/L      Alkaline Phosphatase 96 U/L      Total Bilirubin 0.3 mg/dL      eGFR Non African Amer 87 mL/min/1.73      Globulin 3.7 gm/dL      A/G Ratio 1.1 g/dL      BUN/Creatinine Ratio 15.2     Anion Gap 14.0 mmol/L     Narrative:      GFR Normal >60  Chronic Kidney Disease <60  Kidney Failure <15      BNP [[id number]]  (Normal) Collected: 01/14/21 2230    Specimen: Blood from Arm, Left Updated: 01/14/21 2304     proBNP <5.0 pg/mL     Narrative:      Among patients with dyspnea, NT-proBNP is highly sensitive for  the detection of acute congestive heart failure. In addition NT-proBNP of <300 pg/ml effectively rules out acute congestive heart failure with 99% negative predictive value.    Results may be falsely decreased if patient taking Biotin.      Troponin [[id number]]  (Normal) Collected: 01/14/21 2230    Specimen: Blood from Arm, Left Updated: 01/14/21 2301     Troponin T <0.010 ng/mL     Narrative:      Troponin T Reference Range:  <= 0.03 ng/mL-   Negative for AMI  >0.03 ng/mL-     Abnormal for myocardial necrosis.  Clinicians would have to utilize clinical acumen, EKG, Troponin and serial changes to determine if it is an Acute Myocardial Infarction or myocardial injury due to an underlying chronic condition.       Results may be falsely decreased if patient taking Biotin.            XR Chest 1 View   Final Result   Gross hypoaeration of the lungs with bibasilar atelectasis   and mild central vascular crowding. No definite acute infiltrates or   consolidation.   This report was finalized on 01/14/2021 21:12 by Dr. Raud More MD.          ED Course  ED Course as of Jan 14 2314   Thu Jan 14, 2021 2313 I told the patient his testing is all turned out fine and apparently he just battling Covid.  I know he feels miserable but just take time and fluids and rest.  We did talk about treatment of his fever.  He is discharged in stable condition.    [TR]      ED Course User Index  [TR] Aakash Trevizo Jr., MD          MDM  Number of Diagnoses or Management Options  COVID-19: established and worsening     Amount and/or Complexity of Data Reviewed  Clinical lab tests: ordered and reviewed  Tests in the radiology section of CPT®: ordered and reviewed    Risk of Complications, Morbidity, and/or Mortality  Presenting problems: moderate  Diagnostic procedures: moderate  Management options: moderate    Patient Progress  Patient progress: stable      Final diagnoses:   COVID-19          Aakash Trevizo Jr., MD  01/14/21  2451

## 2021-01-16 LAB
QT INTERVAL: 320 MS
QTC INTERVAL: 442 MS

## 2021-01-16 NOTE — PROGRESS NOTES
Notified SOBEIDA Perdue that this is a [facility] patient and asked her to notify Dr Trevizo.  .21:01 CST

## 2021-01-16 NOTE — ED PROVIDER NOTES
Subjective   Patient returns to the ER with a complaint of return of his fever and chills.  He was here last night with similar symptoms.  He was diagnosed with Covid last week.  He was concerned because his fever last night was so high but evaluation turned out everything to be okay.  Apparently was just part of his Covid illness.  However today he had the BAM infusion therapy by his out patient doctor.  But he had fever this afternoon that made him quite hard chills and he had tingling in his fingertips and his wife felt like his lips were blue and brought him in for reevaluation.  His pulse ox never dropped at home but they wanted him reevaluated.      History provided by:  Patient   used: No    Fever  Max temp prior to arrival:  102.5  Temp source:  Oral  Severity:  Severe  Onset quality:  Sudden  Duration:  1 hour  Timing:  Constant  Progression:  Partially resolved  Chronicity:  Recurrent  Relieved by:  Acetaminophen  Worsened by:  Nothing  Ineffective treatments:  None tried  Associated symptoms: chills, cough, headaches and myalgias    Risk factors: recent sickness    Risk factors: no contaminated food, no contaminated water, no occupational exposure and no recent travel        Review of Systems   Constitutional: Positive for chills and fever.   Respiratory: Positive for cough.    Cardiovascular: Negative.    Gastrointestinal: Negative.    Genitourinary: Negative.    Musculoskeletal: Positive for myalgias.   Skin: Negative.    Neurological: Positive for headaches.   Psychiatric/Behavioral: Negative.    All other systems reviewed and are negative.      Past Medical History:   Diagnosis Date   • COVID-19    • Injury of back    • Kidney stone        Allergies   Allergen Reactions   • Bee Venom Anaphylaxis       Past Surgical History:   Procedure Laterality Date   • BACK SURGERY      prior discectomies at L3-4 and L4-5 and a laminectomy at L5 and S1 ([facility]   • EXTRACORPOREAL  SHOCK WAVE LITHOTRIPSY (ESWL)         Family History   Problem Relation Age of Onset   • No Known Problems Father    • No Known Problems Mother        Social History     Socioeconomic History   • Marital status:      Spouse name: Not on file   • Number of children: Not on file   • Years of education: Not on file   • Highest education level: Not on file   Tobacco Use   • Smoking status: Never Smoker   • Smokeless tobacco: Never Used   Substance and Sexual Activity   • Alcohol use: No     Frequency: Never   • Drug use: No       Prior to Admission medications    Medication Sig Start Date End Date Taking? Authorizing Provider   cyclobenzaprine (FLEXERIL) 10 MG tablet Take 10 mg by mouth 3 (Three) Times a Day As Needed for Muscle Spasms.   Yes Tianna Goodrich MD   EPINEPHrine (EPIPEN) 0.3 MG/0.3ML solution auto-injector injection Inject 0.3 mg into the appropriate muscle as directed by prescriber 1 (One) Time. 7/26/19  Yes Tianna Goodrich MD   ibuprofen (ADVIL,MOTRIN) 800 MG tablet  5/11/20  Yes Tianna Goodrich MD   metFORMIN (GLUCOPHAGE) 1000 MG tablet Take 1,000 mg by mouth 2 (Two) Times a Day With Meals. 7/20/19  Yes Tianna Goodrich MD   PROAIR  (90 Base) MCG/ACT inhaler Inhale 1 puff Every 4 (Four) Hours As Needed. 9/20/18  Yes Tianna Goodrich MD       Medications   sodium chloride 0.9 % flush 10 mL (has no administration in time range)   sodium chloride 0.9 % bolus 1,000 mL (0 mL Intravenous Stopped 1/15/21 2147)   acetaminophen (TYLENOL) tablet 1,000 mg (1,000 mg Oral Given 1/15/21 2146)       Vitals:    01/15/21 2147   BP: 152/82   Pulse: 98   Resp: 19   Temp: 99.9 °F (37.7 °C)   SpO2: 94%         Objective   Physical Exam  Vitals signs and nursing note reviewed.   Constitutional:       Appearance: He is well-developed.   HENT:      Head: Normocephalic and atraumatic.   Neck:      Musculoskeletal: Normal range of motion and neck supple.   Cardiovascular:      Rate and  Rhythm: Normal rate.   Pulmonary:      Effort: Pulmonary effort is normal.      Breath sounds: Normal breath sounds.   Abdominal:      General: Bowel sounds are normal.      Palpations: Abdomen is soft.   Skin:     General: Skin is warm and dry.   Neurological:      General: No focal deficit present.      Mental Status: He is alert and oriented to person, place, and time.   Psychiatric:         Mood and Affect: Mood normal.         Behavior: Behavior normal.         Procedures         Lab Results (last 24 hours)     Procedure Component Value Units Date/Time    Comprehensive Metabolic Panel [[id number]] Collected: 01/14/21 2230    Specimen: Blood from Arm, Left Updated: 01/14/21 2301     Glucose 99 mg/dL      BUN 14 mg/dL      Creatinine 0.92 mg/dL      Sodium 137 mmol/L      Potassium 4.2 mmol/L      Chloride 100 mmol/L      CO2 23.0 mmol/L      Calcium 8.9 mg/dL      Total Protein 7.7 g/dL      Albumin 4.00 g/dL      ALT (SGPT) 36 U/L      AST (SGOT) 24 U/L      Alkaline Phosphatase 96 U/L      Total Bilirubin 0.3 mg/dL      eGFR Non African Amer 87 mL/min/1.73      Globulin 3.7 gm/dL      A/G Ratio 1.1 g/dL      BUN/Creatinine Ratio 15.2     Anion Gap 14.0 mmol/L     Narrative:      GFR Normal >60  Chronic Kidney Disease <60  Kidney Failure <15      BNP [[id number]]  (Normal) Collected: 01/14/21 2230    Specimen: Blood from Arm, Left Updated: 01/14/21 2304     proBNP <5.0 pg/mL     Narrative:      Among patients with dyspnea, NT-proBNP is highly sensitive for the detection of acute congestive heart failure. In addition NT-proBNP of <300 pg/ml effectively rules out acute congestive heart failure with 99% negative predictive value.    Results may be falsely decreased if patient taking Biotin.      Troponin [[id number]]  (Normal) Collected: 01/14/21 2230    Specimen: Blood from Arm, Left Updated: 01/14/21 2301     Troponin T <0.010 ng/mL     Narrative:      Troponin T Reference Range:  <= 0.03 ng/mL-   Negative for  AMI  >0.03 ng/mL-     Abnormal for myocardial necrosis.  Clinicians would have to utilize clinical acumen, EKG, Troponin and serial changes to determine if it is an Acute Myocardial Infarction or myocardial injury due to an underlying chronic condition.       Results may be falsely decreased if patient taking Biotin.      [place] Blood Culture Bottle Set [[id number]] Collected: 01/15/21 1958    Specimen: Blood from Arm, Right Updated: 01/15/21 2101     Extra Tube Hold for add-ons.     Comment: Auto resulted.       CBC & Differential [[id number]]  (Abnormal) Collected: 01/15/21 1958    Specimen: Blood Updated: 01/15/21 2045    Narrative:      The following orders were created for panel order CBC & Differential.  Procedure                               Abnormality         Status                     ---------                               -----------         ------                     CBC Auto Differential[[id number]]        Abnormal            Final result                 Please view results for these tests on the individual orders.    Comprehensive Metabolic Panel [[id number]]  (Abnormal) Collected: 01/15/21 1958    Specimen: Blood Updated: 01/15/21 2056     Glucose 123 mg/dL      BUN 17 mg/dL      Creatinine 0.85 mg/dL      Sodium 137 mmol/L      Potassium 4.2 mmol/L      Chloride 100 mmol/L      CO2 25.0 mmol/L      Calcium 8.9 mg/dL      Total Protein 7.9 g/dL      Albumin 4.20 g/dL      ALT (SGPT) 32 U/L      AST (SGOT) 24 U/L      Alkaline Phosphatase 97 U/L      Total Bilirubin 0.4 mg/dL      eGFR Non African Amer 96 mL/min/1.73      Globulin 3.7 gm/dL      A/G Ratio 1.1 g/dL      BUN/Creatinine Ratio 20.0     Anion Gap 12.0 mmol/L     Narrative:      GFR Normal >60  Chronic Kidney Disease <60  Kidney Failure <15      Lactic Acid, Plasma [[id number]]  (Normal) Collected: 01/15/21 1958    Specimen: Blood Updated: 01/15/21 2050     Lactate 1.8 mmol/L     CBC Auto Differential [[id number]]  (Abnormal) Collected:  01/15/21 1958    Specimen: Blood Updated: 01/15/21 2045     WBC 6.38 10*3/mm3      RBC 5.51 10*6/mm3      Hemoglobin 16.6 g/dL      Hematocrit 46.8 %      MCV 84.9 fL      MCH 30.1 pg      MCHC 35.5 g/dL      RDW 13.8 %      RDW-SD 43.0 fl      MPV 10.5 fL      Platelets 125 10*3/mm3      Neutrophil % 74.0 %      Lymphocyte % 19.1 %      Monocyte % 6.0 %      Eosinophil % 0.3 %      Basophil % 0.3 %      Immature Grans % 0.3 %      Neutrophils, Absolute 4.72 10*3/mm3      Lymphocytes, Absolute 1.22 10*3/mm3      Monocytes, Absolute 0.38 10*3/mm3      Eosinophils, Absolute 0.02 10*3/mm3      Basophils, Absolute 0.02 10*3/mm3      Immature Grans, Absolute 0.02 10*3/mm3      nRBC 0.0 /100 WBC     Blood Culture - Blood, Arm, Right [[id number]] Collected: 01/15/21 1958    Specimen: Blood from Arm, Right Updated: 01/15/21 2103    Blood Culture - Blood, Arm, Left [[id number]] Collected: 01/15/21 2047    Specimen: Blood from Arm, Left Updated: 01/15/21 2110          No orders to display       ED Course  ED Course as of Jamar 15 2157   Fri Jamar 15, 2021   2156 I told the patient is retesting all looked okay.  This is just a matter of his Covid also makes him feel so bad and will clear as he gets better but will take some time.  He seems to understand this.  He is discharged in stable condition.    [TR]      ED Course User Index  [TR] Aakash Trevizo Jr., MD          MDM  Number of Diagnoses or Management Options  COVID-19: established and worsening     Amount and/or Complexity of Data Reviewed  Clinical lab tests: ordered and reviewed    Risk of Complications, Morbidity, and/or Mortality  Presenting problems: moderate  Diagnostic procedures: moderate  Management options: moderate    Patient Progress  Patient progress: stable      Final diagnoses:   COVID-19          Aakash Trevizo Jr., MD  01/15/21 2157

## 2021-01-18 ENCOUNTER — PATIENT OUTREACH (OUTPATIENT)
Dept: CASE MANAGEMENT | Facility: OTHER | Age: 51
End: 2021-01-18

## 2021-01-18 NOTE — OUTREACH NOTE
"ED Potential Covid Discharge Follow-up    Patient seen at [facility] ED 1-15-21 for SOB. He is COVID-19 positive and received the BAM infusion as an outpatient earlier on 1-15-21. He developed numbness in his fingertips and blue lips per his wife's report. Today he denies SOB and oxygen saturation is averaging 96%. Educated to return to ED if oxygen saturation is less than 90% for more than 2 mintues or with any worsening symptoms. He experiences \"mild headaches\" but is using Tylenol with relief. Discussed 24/7 nurse triage line and COVID-19 hotline listed on AVS. He denied needs today.     Zoila Parisi RN  Ambulatory     1/18/2021, 13:33 CST      "

## 2021-01-19 LAB
BACTERIA SPEC AEROBE CULT: NORMAL
BACTERIA SPEC AEROBE CULT: NORMAL

## 2021-01-20 ENCOUNTER — PATIENT OUTREACH (OUTPATIENT)
Dept: PHARMACY | Facility: HOSPITAL | Age: 51
End: 2021-01-20

## 2021-01-20 LAB — BACTERIA SPEC AEROBE CULT: NORMAL

## 2021-01-21 ENCOUNTER — PATIENT OUTREACH (OUTPATIENT)
Dept: PHARMACY | Facility: HOSPITAL | Age: 51
End: 2021-01-21

## 2021-01-22 LAB
BACTERIA SPEC AEROBE CULT: ABNORMAL
GRAM STN SPEC: ABNORMAL

## 2021-01-23 ENCOUNTER — TELEPHONE (OUTPATIENT)
Dept: EMERGENCY DEPT | Facility: HOSPITAL | Age: 51
End: 2021-01-23

## 2021-01-26 ENCOUNTER — PATIENT OUTREACH (OUTPATIENT)
Dept: PHARMACY | Facility: HOSPITAL | Age: 51
End: 2021-01-26

## 2021-01-26 NOTE — OUTREACH NOTE
COVID-19 Monoclonal Antibody Pharmacy Patient Outreach        Consult Details  Patient Name (): Randell Reddy  (1970)   PCP: Radu Haley MD   Medication: bamlanivimab   Date of Administration: 1/15/21     Discussion:  Called patient to see check on their status post-administration. I inquired about symptoms and respiratory function (O2 saturation if they have monitor).    Patient stated he has improved overall and is even back to work.  Numbness in fingertips and blue lips improved shortly after 21.  The mild headaches are gone.    Summary:  I encouraged to reach out to PCP if any worsening of symptoms is noted or report to the ED.      Edison Goldberg, PharmD  2021 11:13 CST

## 2021-02-03 ENCOUNTER — TELEPHONE (OUTPATIENT)
Dept: NEUROSURGERY | Facility: CLINIC | Age: 51
End: 2021-02-03

## 2021-02-03 NOTE — TELEPHONE ENCOUNTER
CALLED PT AND INFORMED THAT WE NEED [facility] FROM [facility] - HE HAS SEEN HIS PRIMARY THIS WEEK.  TOLD HIM TO CALL AND INFORM THEM THAT HE HAS APPMT WITH DR. GUARDADO SO THEY CAN PUT IN A NEW CONSULT.  I HAVE FA[facility]ED OVER THE [facility]APERWORK TO [facility] FOR NEW AUTH

## 2021-02-08 ENCOUNTER — OFFICE VISIT (OUTPATIENT)
Dept: NEUROSURGERY | Facility: CLINIC | Age: 51
End: 2021-02-08

## 2021-02-08 DIAGNOSIS — M51.36 DEGENERATION OF LUMBAR INTERVERTEBRAL DISC: Primary | ICD-10-CM

## 2021-02-08 DIAGNOSIS — E66.01 MORBID OBESITY WITH BMI OF 40.0-44.9, ADULT (HCC): ICD-10-CM

## 2021-02-08 PROCEDURE — 99215 OFFICE O/P EST HI 40 MIN: CPT | Performed by: NEUROLOGICAL SURGERY

## 2021-02-08 RX ORDER — METFORMIN HYDROCHLORIDE 500 MG/1
500 TABLET, EXTENDED RELEASE ORAL
COMMUNITY
Start: 2021-01-22

## 2021-02-08 RX ORDER — ROSUVASTATIN CALCIUM 40 MG/1
40 TABLET, COATED ORAL DAILY
Status: ON HOLD | COMMUNITY
Start: 2021-01-30 | End: 2022-07-25

## 2021-02-08 NOTE — PROGRESS NOTES
Chief complaint:   Chief Complaint   Patient presents with   • Back Pain     1 year F/U low back and B leg pain. States that leg pain is lateral and runs to feet.        Subjective     HPI:   Interval History: Randell is well-known to our office.  He is here for routine follow-up regarding low back pain and radiculopathy.  He previously saw my nurse practitioner in June 2020 for back pain and bilateral leg pain.  This is his 1 year follow-up.  He complains of back pain radiating to his legs approximately 2-3 times a week.  He does not miss work for his back pain but occasionally for a kidney stone. He has undergone 2 discectomies and a laminectomy at [facility].  He has previous imaging and an MRI from January 2020.    Currently is complaining of 7 out of 10 pain.  70% back pain and 30% leg pain.  He has had left leg pain since his initial injuries.  This is associated with a left calf atrophy.  He has numbness and tingling in his bilateral calfs and numbness in his feet particularly when walking.  He has no loss of fine motor function.  He does occasionally feel like his left leg is weak but he does not use a cane or walker.  He has shooting pains while walking.  He has no bowel or bladder incontinence.  His pain is exacerbated by walking, standing, and or lifting.  And it is relieved by heat and muscle relaxer.  He has completed a course of physical therapy and Occupational Therapy with minimal improvement..      Oswestry Disability Index Lumbar = 34%   Score   Pain Intensity Fairly severe pain-3   Personal Care I can look after myself but it is slow and painful-2   Lifting Can lift heavy weights with extra pain-1   Walking Pain prevents > 100 yards-3   Sitting Pain prevents sitting > 1 hr-2   Standing Pain limits standing to < 1/2 hr-3   Sleeping Occasionally disturbed-1   Sex Life (if applicable) Sex causes extra pain-2   Social Life Social life is normal-0   Traveling Travel gives me extra pain-1    (Trisha et al, 1980)    SCORE INTERPRETATION OF THE OSWESTRY LBP DISABILITY QUESTIONNAIRE     20-40% Moderate disability This group experiences more pain and problems with sitting, lifting, and standing. Travel and social life are more difficult and they may well be off work. Personal care, sexual activity, and sleeping are not grossly affected, and the back condition can usually be managed by conservative means.           Review of Systems   HENT: Negative.    Eyes: Negative.    Respiratory: Negative.    Cardiovascular: Negative.    Gastrointestinal: Negative.    Endocrine: Negative.    Genitourinary: Negative.    Musculoskeletal: Positive for back pain and gait problem.   Skin: Negative.    Allergic/Immunologic: Negative.    Neurological: Positive for weakness and numbness.   Hematological: Negative.    Psychiatric/Behavioral: Negative.        PFSH:  Past Medical History:   Diagnosis Date   • COVID-19    • Injury of back    • Kidney stone        Past Surgical History:   Procedure Laterality Date   • BACK SURGERY      prior discectomies at L3-4 and L4-5 and a laminectomy at L5 and S1 ([facility])   • EXTRACORPOREAL SHOCK WAVE LITHOTRIPSY (ESWL)         Objective      Current Outpatient Medications   Medication Sig Dispense Refill   • cyclobenzaprine (FLEXERIL) 10 MG tablet Take 10 mg by mouth 3 (Three) Times a Day As Needed for Muscle Spasms.     • EPINEPHrine (EPIPEN) 0.3 MG/0.3ML solution auto-injector injection Inject 0.3 mg into the appropriate muscle as directed by prescriber 1 (One) Time.     • ibuprofen (ADVIL,MOTRIN) 800 MG tablet Take 800 mg by mouth Every 8 (Eight) Hours As Needed.     • metFORMIN ER (GLUCOPHAGE-XR) 500 MG 24 hr tablet Take 500 mg by mouth Daily With Breakfast.     • PROAIR  (90 Base) MCG/ACT inhaler Inhale 1 puff Every 4 (Four) Hours As Needed.     • rosuvastatin (CRESTOR) 40 MG tablet Take 40 mg by mouth Daily.       No current facility-administered medications  for this visit.        Vital Signs  There were no vitals taken for this visit.  Physical Exam  Eyes:      Extraocular Movements: EOM normal.      Pupils: Pupils are equal, round, and reactive to light.   Neurological:      Mental Status: He is oriented to person, place, and time.      Gait: Gait is intact.      Deep Tendon Reflexes:      Reflex Scores:       Tricep reflexes are 2+ on the right side and 2+ on the left side.       Bicep reflexes are 2+ on the right side and 2+ on the left side.       Brachioradialis reflexes are 2+ on the right side and 2+ on the left side.       Patellar reflexes are 2+ on the right side and 2+ on the left side.       Achilles reflexes are 1+ on the right side and 1+ on the left side.  Psychiatric:         Speech: Speech normal.       Neurologic Exam     Mental Status   Oriented to person, place, and time.   Speech: speech is normal     Cranial Nerves     CN II   Visual fields full to confrontation.     CN III, IV, VI   Pupils are equal, round, and reactive to light.  Extraocular motions are normal.     CN V   Right facial sensation deficit: none  Left facial sensation deficit: none    CN VII   Facial expression full, symmetric.     CN VIII   Hearing: intact    CN IX, X   Palate: symmetric    CN XI   Right sternocleidomastoid strength: normal  Left sternocleidomastoid strength: normal    CN XII   Tongue deviation: none    Motor Exam     Strength   Right deltoid: 5/5  Left deltoid: 5/5  Right biceps: 5/5  Left biceps: 5/5  Right triceps: 5/5  Left triceps: 5/5  Right interossei: 5/5  Left interossei: 5/5  Right iliopsoas: 5/5  Left iliopsoas: 5/5  Right quadriceps: 5/5  Left quadriceps: 5/5  Right anterior tibial: 5/5  Left anterior tibial: 5/5  Right gastroc: 5/5  Left gastroc: 4/5    Sensory Exam   Right arm light touch: normal  Left arm light touch: normal  Right leg light touch: decreased from knee  Left leg light touch: decreased from knee    Gait, Coordination, and Reflexes      Gait  Gait: normal    Reflexes   Right brachioradialis: 2+  Left brachioradialis: 2+  Right biceps: 2+  Left biceps: 2+  Right triceps: 2+  Left triceps: 2+  Right patellar: 2+  Left patellar: 2+  Right achilles: 1+  Left achilles: 1+  Right plantar: normal  Left plantar: normal  Right Morales: absent  Left Morales: absent    (12 bullet pts)    Results Review:   Xr Chest 1 View    Result Date: 1/14/2021  Gross hypoaeration of the lungs with bibasilar atelectasis and mild central vascular crowding. No definite acute infiltrates or consolidation. This report was finalized on 01/14/2021 21:12 by Dr. Radu More MD.    Assessment and plan:  Randell Reddy is a 50 y.o. male with a significant medical history of prior discectomies at L3-4 and L4-5 and an open laminectomy at L5 and S1 ([facility] and then [facility]), hearing loss, and morbid obesity.  He presents today for follow-up of unchanged intermittent lower back and bilateral leg claudication pain, 70% legs, 30% back.  Physical exam findings of BMI 47.1, mild left gastrocnemius muscle atrophy (left equals 42.5 cm, right equals 95.5 cm circumference), 1+ bilateral Achilles reflex, and an equivocal left plantar reflex.  No recent imaging.     Recommendations:  Mechanical Lumbar back pain  Advanced disc degeneration L5/S1 > L4/5  Stable herniated disc at L4/5 asymmetric to the left  Bilateral left greater than right claudicating symptoms  Retrolisthesis of L4 on L5  Status post discectomy and laminectomy (OSH)  Outcomes  Justin et al., showed that among adult scoliosis corrections with 2-year follow up the Oswestry Disability Index (ADÁN) had a average decrease of 15 points.  While the SRS showed a decrease of 23.1.  Daankit et al showed a ADÁN decrease from 49 to 25.    Gilberto et al Statistically significant improvements were seen in physical functioning, social function, bodily pain, and perceived health change on the SF 36.    Two papers by the spinal deformity  study group use prospectively collected data on non-match cohorts to look at leg pain and disability and quality of life. They showed that it two years those in the operative group were better off than those in the nonoperative group in spite of having more disability, leg pain and lower quality of life preop. (Spine. [id number]: 2009)(Spine. [id number]: 2009)    Christophers complaints and physical exam findings are relatively unchanged from his prior encounter.  His ADÁN has decreased from 40-20-34%.  Randell's physical exam, EMG and nerve conduction study showing bilateral radiculopathy, and MRI are consistent with symptomatic stenosis at L4/5.  Given his prior laminectomies his next step would be a fusion.  He is reticent to proceed with his fusion for fear of adjacent segment disease and requiring further spine surgeries.  We discussed the likelihood of return to work after two-level lumbar fusion is approximately 85%.  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  At this point he is not ready to proceed with a two-level fusion.  At the point that he is ready to proceed I would recommend a noncontrast CT of the lumbar spine and repeat MRI of the lumbar spine with and without contrast for preoperative planning.  We will see him back in 1 year with my nurse practitioner for reevaluation.     Obese Class III extreme obesity: > or equal to 40kg/m2  Body mass index is 47.1 kg/m².  Information on the DASH diet provided in the AVS.  We will continue to provided diet and exercise information with the goal of weight loss at each scheduled appointment.  In the interim, referral to bariatrics, Dr. Knott was provided.      1. Degeneration of lumbar intervertebral disc    2. Morbid obesity with BMI of 40.0-44.9, adult (CMS/[facility])        Recommendations:  Diagnoses and all orders for this visit:    1. Degeneration of lumbar intervertebral disc (Primary)    2. Morbid obesity with  BMI of 40.0-44.9, adult (CMS/HCC)        Return in about 1 year (around 2/8/2022) for symptom check, with Dr. Paz.    Level of Risk: Moderate due to: two stable chronic illnesses  MDM: Low Complexity  (Mod = 58434, High = 41509)    Thank you, for allowing me to continue to participate in the care of this patient.    Sincerely,  Gigi Paz MD

## 2021-08-03 ENCOUNTER — HOSPITAL ENCOUNTER (OUTPATIENT)
Dept: GENERAL RADIOLOGY | Facility: HOSPITAL | Age: 51
Discharge: HOME | End: 2021-08-03
Admitting: NURSE PRACTITIONER

## 2021-08-03 ENCOUNTER — TRANSCRIBE ORDERS (OUTPATIENT)
Dept: ADMINISTRATIVE | Facility: HOSPITAL | Age: 51
End: 2021-08-03

## 2021-08-03 DIAGNOSIS — M25.571 PAIN IN RIGHT ANKLE AND JOINTS OF RIGHT FOOT: Primary | ICD-10-CM

## 2021-08-03 PROCEDURE — 73610 X-RAY EXAM OF ANKLE: CPT

## 2021-09-14 ENCOUNTER — OFFICE VISIT (OUTPATIENT)
Dept: UROLOGY | Facility: CLINIC | Age: 51
End: 2021-09-14

## 2021-09-14 ENCOUNTER — HOSPITAL ENCOUNTER (OUTPATIENT)
Dept: GENERAL RADIOLOGY | Facility: HOSPITAL | Age: 51
Discharge: HOME | End: 2021-09-14
Admitting: PHYSICIAN ASSISTANT

## 2021-09-14 VITALS — TEMPERATURE: 97.4 F | HEIGHT: 74 IN | WEIGHT: 315 LBS | BODY MASS INDEX: 40.43 KG/M2

## 2021-09-14 DIAGNOSIS — Z87.442 HISTORY OF KIDNEY STONES: ICD-10-CM

## 2021-09-14 DIAGNOSIS — R10.9 FLANK PAIN: ICD-10-CM

## 2021-09-14 DIAGNOSIS — Z87.442 HISTORY OF KIDNEY STONES: Primary | ICD-10-CM

## 2021-09-14 LAB
BILIRUB BLD-MCNC: NEGATIVE MG/DL
CLARITY, POC: CLEAR
COLOR UR: ABNORMAL
GLUCOSE UR STRIP-MCNC: NEGATIVE MG/DL
KETONES UR QL: NEGATIVE
LEUKOCYTE EST, POC: NEGATIVE
NITRITE UR-MCNC: NEGATIVE MG/ML
PH UR: 5.5 [PH] (ref 5–8)
PROT UR STRIP-MCNC: ABNORMAL MG/DL
RBC # UR STRIP: ABNORMAL /UL
SP GR UR: 1.02 (ref 1–1.03)
UROBILINOGEN UR QL: NORMAL

## 2021-09-14 PROCEDURE — 99214 OFFICE O/P EST MOD 30 MIN: CPT | Performed by: PHYSICIAN ASSISTANT

## 2021-09-14 PROCEDURE — 81001 URINALYSIS AUTO W/SCOPE: CPT | Performed by: PHYSICIAN ASSISTANT

## 2021-09-14 PROCEDURE — 74018 RADEX ABDOMEN 1 VIEW: CPT

## 2021-09-14 RX ORDER — TAMSULOSIN HYDROCHLORIDE 0.4 MG/1
0.4 CAPSULE ORAL DAILY
Status: ON HOLD | COMMUNITY
Start: 2021-08-26 | End: 2022-07-26 | Stop reason: SDUPTHER

## 2021-09-14 NOTE — PROGRESS NOTES
Subjective    Mr. Reddy is 50 y.o. male    Chief Complaint:     History of Present Illness  Patient is a 50-year-old gentleman with history of recurrent kidney stones we have not seen him since 2018.  He has had intermittent right flank right lower back pain which comes and goes he has passed multiple stones since he was last seen he showed me samples of stones that he has passed we have done a previous stone analysis and he has undergone 2 previous 24-hour urine for stone risk.  Patient got a KUB and shows a stone in the left lower pole which appears larger than previous although I did not see any other obvious stones.  The following portions of the patient's history were reviewed and updated as appropriate: allergies, current medications, past family history, past medical history, past social history, past surgical history and problem list.    Review of Systems   Constitutional: Negative for chills and fever.   Gastrointestinal: Negative for abdominal pain, anal bleeding, blood in stool, nausea and vomiting.   Genitourinary: Positive for frequency. Negative for dysuria, hematuria and urgency. Flank pain: right side.         Current Outpatient Medications:   •  cyclobenzaprine (FLEXERIL) 10 MG tablet, Take 10 mg by mouth 3 (Three) Times a Day As Needed for Muscle Spasms., Disp: , Rfl:   •  EPINEPHrine (EPIPEN) 0.3 MG/0.3ML solution auto-injector injection, Inject 0.3 mg into the appropriate muscle as directed by prescriber 1 (One) Time., Disp: , Rfl:   •  ibuprofen (ADVIL,MOTRIN) 800 MG tablet, Take 800 mg by mouth Every 8 (Eight) Hours As Needed., Disp: , Rfl:   •  metFORMIN ER (GLUCOPHAGE-XR) 500 MG 24 hr tablet, Take 500 mg by mouth Daily With Breakfast., Disp: , Rfl:   •  PROAIR  (90 Base) MCG/ACT inhaler, Inhale 1 puff Every 4 (Four) Hours As Needed., Disp: , Rfl:   •  tamsulosin (FLOMAX) 0.4 MG capsule 24 hr capsule, , Disp: , Rfl:   •  rosuvastatin (CRESTOR) 40 MG tablet, Take 40 mg by mouth Daily.,  "Disp: , Rfl:     Past Medical History:   Diagnosis Date   • COVID-19    • Injury of back    • Kidney stone        Past Surgical History:   Procedure Laterality Date   • BACK SURGERY      prior discectomies at L3-4 and L4-5 and a laminectomy at L5 and S1 ([facility]   • EXTRACORPOREAL SHOCK WAVE LITHOTRIPSY (ESWL)         Social History     Socioeconomic History   • Marital status:      Spouse name: Not on file   • Number of children: Not on file   • Years of education: Not on file   • Highest education level: Not on file   Tobacco Use   • Smoking status: Never Smoker   • Smokeless tobacco: Never Used   Vaping Use   • Vaping Use: Never used   Substance and Sexual Activity   • Alcohol use: No   • Drug use: No   • Sexual activity: Defer       Family History   Problem Relation Age of Onset   • No Known Problems Father    • No Known Problems Mother        Objective    Temp 97.4 °F (36.3 °C) (Temporal)   Ht 188 cm (74\")   Wt (!) 163 kg (360 lb)   BMI 46.22 kg/m²     Physical Exam  Vitals reviewed.   Constitutional:       Appearance: Normal appearance.   HENT:      Head: Normocephalic and atraumatic.      Right Ear: External ear normal.      Left Ear: External ear normal.   Pulmonary:      Effort: Pulmonary effort is normal.   Abdominal:      Palpations: Abdomen is soft.      Tenderness: There is no abdominal tenderness. There is no right CVA tenderness or left CVA tenderness.   Neurological:      General: No focal deficit present.      Mental Status: He is alert and oriented to person, place, and time.   Psychiatric:         Mood and Affect: Mood normal.         Behavior: Behavior normal.             Results for orders placed or performed in visit on 09/14/21   POC Urinalysis Dipstick, Multipro    Specimen: Urine   Result Value Ref Range    Color Dark Yellow Yellow, Straw, Dark Yellow, Liya    Clarity, UA Clear Clear    Glucose, UA Negative Negative, 1000 mg/dL (3+) mg/dL    Bilirubin Negative " Negative    Ketones, UA Negative Negative    Specific Gravity  1.020 1.005 - 1.030    Blood, UA Trace (A) Negative    pH, Urine 5.5 5.0 - 8.0    Protein, POC 30 mg/dL (A) Negative mg/dL    Urobilinogen, UA Normal Normal    Nitrite, UA Negative Negative    Leukocytes Negative Negative       KUB independent review    A KUB is available for me to review today.  The image is inspected for a bowel gas pattern and the general bone structure of the spine and pelvis. The kidneys are then inspected closely.  Renal outline is noted if identifiable. The kidney, collecting system, and anticipated path of the ureter are examined for calcifications including those in the true pelvis.  This film reveals:    On the right there are no obvious stones.    On the left there is a single renal stone measuring 8-9 mm.    Assessment and Plan    Diagnoses and all orders for this visit:    1. History of kidney stones (Primary)  -     Cancel: POC Urinalysis Dipstick, Multipro  -     XR abdomen kub; Future  -     POC Urinalysis Dipstick, Multipro  -     CT Abdomen Pelvis Without Contrast; Future    2. Flank pain  -     CT Abdomen Pelvis Without Contrast; Future    Patient with history of recurrent kidney stones he has not been here since 2018 he has passed multiple stones since he was last seen he brought a bottle of stones he is passed we have done a previous stone analysis as well as 24 urine previously.  He is having intermittent pain primarily on the right side.  I saw stone in the left lower pole but no other obvious stones in the course of either ureter I feel he needs to be scheduled for CT renal stone protocol due to his work schedule he would need to do the CT after 330 and he can see me the following week to discuss CT scan findings.

## 2021-09-17 ENCOUNTER — HOSPITAL ENCOUNTER (OUTPATIENT)
Dept: CT IMAGING | Facility: HOSPITAL | Age: 51
Discharge: HOME | End: 2021-09-17
Admitting: PHYSICIAN ASSISTANT

## 2021-09-17 DIAGNOSIS — Z87.442 HISTORY OF KIDNEY STONES: ICD-10-CM

## 2021-09-17 DIAGNOSIS — R10.9 FLANK PAIN: ICD-10-CM

## 2021-09-17 PROCEDURE — 74176 CT ABD & PELVIS W/O CONTRAST: CPT

## 2021-10-22 ENCOUNTER — TELEPHONE (OUTPATIENT)
Dept: NEUROSURGERY | Facility: CLINIC | Age: 51
End: 2021-10-22

## 2021-10-22 NOTE — TELEPHONE ENCOUNTER
Called patient to reschedule his appt with Dr Paz from 2/9/22 to 2/7/22.  Patient was agreeable and new appt was made.  A reminder was mailed to patient at his request.    nadiya osborne CMA

## 2021-11-22 ENCOUNTER — NURSE TRIAGE (OUTPATIENT)
Dept: CALL CENTER | Facility: HOSPITAL | Age: 51
End: 2021-11-22

## 2022-03-07 ENCOUNTER — HOSPITAL ENCOUNTER (OUTPATIENT)
Dept: GENERAL RADIOLOGY | Facility: HOSPITAL | Age: 52
Discharge: HOME | End: 2022-03-07
Admitting: INTERNAL MEDICINE

## 2022-03-07 ENCOUNTER — TRANSCRIBE ORDERS (OUTPATIENT)
Dept: ADMINISTRATIVE | Facility: HOSPITAL | Age: 52
End: 2022-03-07

## 2022-03-07 DIAGNOSIS — R50.9 FEVER, UNSPECIFIED: ICD-10-CM

## 2022-03-07 DIAGNOSIS — R05.9 COUGH: Primary | ICD-10-CM

## 2022-03-07 PROCEDURE — 71046 X-RAY EXAM CHEST 2 VIEWS: CPT

## 2022-06-25 ENCOUNTER — HOSPITAL ENCOUNTER (EMERGENCY)
Facility: HOSPITAL | Age: 52
Discharge: HOME | End: 2022-06-25
Attending: EMERGENCY MEDICINE | Admitting: EMERGENCY MEDICINE

## 2022-06-25 ENCOUNTER — APPOINTMENT (OUTPATIENT)
Dept: CT IMAGING | Facility: HOSPITAL | Age: 52
End: 2022-06-25

## 2022-06-25 VITALS
SYSTOLIC BLOOD PRESSURE: 139 MMHG | RESPIRATION RATE: 18 BRPM | HEIGHT: 74 IN | DIASTOLIC BLOOD PRESSURE: 84 MMHG | BODY MASS INDEX: 40.43 KG/M2 | WEIGHT: 315 LBS | TEMPERATURE: 98.2 F | OXYGEN SATURATION: 99 % | HEART RATE: 91 BPM

## 2022-06-25 DIAGNOSIS — N23 RENAL COLIC ON LEFT SIDE: Primary | ICD-10-CM

## 2022-06-25 DIAGNOSIS — R10.9 ACUTE LEFT FLANK PAIN: ICD-10-CM

## 2022-06-25 LAB
ALBUMIN SERPL-MCNC: 4.6 G/DL (ref 3.5–5.2)
ALBUMIN/GLOB SERPL: 1.4 G/DL
ALP SERPL-CCNC: 101 U/L (ref 39–117)
ALT SERPL W P-5'-P-CCNC: 84 U/L (ref 1–41)
ANION GAP SERPL CALCULATED.3IONS-SCNC: 12 MMOL/L (ref 5–15)
AST SERPL-CCNC: 44 U/L (ref 1–40)
BACTERIA UR QL AUTO: ABNORMAL /HPF
BASOPHILS # BLD AUTO: 0.03 10*3/MM3 (ref 0–0.2)
BASOPHILS NFR BLD AUTO: 0.4 % (ref 0–1.5)
BILIRUB SERPL-MCNC: 0.4 MG/DL (ref 0–1.2)
BILIRUB UR QL STRIP: NEGATIVE
BUN SERPL-MCNC: 11 MG/DL (ref 6–20)
BUN/CREAT SERPL: 12.6 (ref 7–25)
CALCIUM SPEC-SCNC: 9.7 MG/DL (ref 8.6–10.5)
CHLORIDE SERPL-SCNC: 104 MMOL/L (ref 98–107)
CLARITY UR: CLEAR
CO2 SERPL-SCNC: 23 MMOL/L (ref 22–29)
COLOR UR: ABNORMAL
CREAT SERPL-MCNC: 0.87 MG/DL (ref 0.76–1.27)
DEPRECATED RDW RBC AUTO: 44.7 FL (ref 37–54)
EGFRCR SERPLBLD CKD-EPI 2021: 105.1 ML/MIN/1.73
EOSINOPHIL # BLD AUTO: 0.07 10*3/MM3 (ref 0–0.4)
EOSINOPHIL NFR BLD AUTO: 0.8 % (ref 0.3–6.2)
ERYTHROCYTE [DISTWIDTH] IN BLOOD BY AUTOMATED COUNT: 13.8 % (ref 12.3–15.4)
GLOBULIN UR ELPH-MCNC: 3.2 GM/DL
GLUCOSE SERPL-MCNC: 144 MG/DL (ref 65–99)
GLUCOSE UR STRIP-MCNC: NEGATIVE MG/DL
HCT VFR BLD AUTO: 45 % (ref 37.5–51)
HGB BLD-MCNC: 15.1 G/DL (ref 13–17.7)
HGB UR QL STRIP.AUTO: ABNORMAL
HYALINE CASTS UR QL AUTO: ABNORMAL /LPF
IMM GRANULOCYTES # BLD AUTO: 0.03 10*3/MM3 (ref 0–0.05)
IMM GRANULOCYTES NFR BLD AUTO: 0.4 % (ref 0–0.5)
KETONES UR QL STRIP: NEGATIVE
LEUKOCYTE ESTERASE UR QL STRIP.AUTO: NEGATIVE
LYMPHOCYTES # BLD AUTO: 1.31 10*3/MM3 (ref 0.7–3.1)
LYMPHOCYTES NFR BLD AUTO: 15.4 % (ref 19.6–45.3)
MCH RBC QN AUTO: 30 PG (ref 26.6–33)
MCHC RBC AUTO-ENTMCNC: 33.6 G/DL (ref 31.5–35.7)
MCV RBC AUTO: 89.3 FL (ref 79–97)
MONOCYTES # BLD AUTO: 0.55 10*3/MM3 (ref 0.1–0.9)
MONOCYTES NFR BLD AUTO: 6.5 % (ref 5–12)
NEUTROPHILS NFR BLD AUTO: 6.53 10*3/MM3 (ref 1.7–7)
NEUTROPHILS NFR BLD AUTO: 76.5 % (ref 42.7–76)
NITRITE UR QL STRIP: NEGATIVE
NRBC BLD AUTO-RTO: 0 /100 WBC (ref 0–0.2)
PH UR STRIP.AUTO: 7.5 [PH] (ref 5–8)
PLATELET # BLD AUTO: 193 10*3/MM3 (ref 140–450)
PMV BLD AUTO: 10.3 FL (ref 6–12)
POTASSIUM SERPL-SCNC: 4.3 MMOL/L (ref 3.5–5.2)
PROT SERPL-MCNC: 7.8 G/DL (ref 6–8.5)
PROT UR QL STRIP: ABNORMAL
RBC # BLD AUTO: 5.04 10*6/MM3 (ref 4.14–5.8)
RBC # UR STRIP: ABNORMAL /HPF
REF LAB TEST METHOD: ABNORMAL
SODIUM SERPL-SCNC: 139 MMOL/L (ref 136–145)
SP GR UR STRIP: 1.01 (ref 1–1.03)
SQUAMOUS #/AREA URNS HPF: ABNORMAL /HPF
UROBILINOGEN UR QL STRIP: ABNORMAL
WBC # UR STRIP: ABNORMAL /HPF
WBC NRBC COR # BLD: 8.52 10*3/MM3 (ref 3.4–10.8)

## 2022-06-25 PROCEDURE — 96376 TX/PRO/DX INJ SAME DRUG ADON: CPT

## 2022-06-25 PROCEDURE — 25010000002 KETOROLAC TROMETHAMINE PER 15 MG: Performed by: EMERGENCY MEDICINE

## 2022-06-25 PROCEDURE — 81001 URINALYSIS AUTO W/SCOPE: CPT | Performed by: EMERGENCY MEDICINE

## 2022-06-25 PROCEDURE — 96375 TX/PRO/DX INJ NEW DRUG ADDON: CPT

## 2022-06-25 PROCEDURE — 0 HYDROMORPHONE 1 MG/ML SOLUTION: Performed by: EMERGENCY MEDICINE

## 2022-06-25 PROCEDURE — 80053 COMPREHEN METABOLIC PANEL: CPT | Performed by: EMERGENCY MEDICINE

## 2022-06-25 PROCEDURE — 96374 THER/PROPH/DIAG INJ IV PUSH: CPT

## 2022-06-25 PROCEDURE — 85025 COMPLETE CBC W/AUTO DIFF WBC: CPT | Performed by: EMERGENCY MEDICINE

## 2022-06-25 PROCEDURE — 99284 EMERGENCY DEPT VISIT MOD MDM: CPT

## 2022-06-25 PROCEDURE — 25010000002 ONDANSETRON PER 1 MG: Performed by: EMERGENCY MEDICINE

## 2022-06-25 PROCEDURE — 74176 CT ABD & PELVIS W/O CONTRAST: CPT

## 2022-06-25 RX ORDER — KETOROLAC TROMETHAMINE 15 MG/ML
15 INJECTION, SOLUTION INTRAMUSCULAR; INTRAVENOUS ONCE
Status: COMPLETED | OUTPATIENT
Start: 2022-06-25 | End: 2022-06-25

## 2022-06-25 RX ORDER — HYDROCODONE BITARTRATE AND ACETAMINOPHEN 5; 325 MG/1; MG/1
1 TABLET ORAL EVERY 6 HOURS PRN
Qty: 8 TABLET | Refills: 0 | Status: SHIPPED | OUTPATIENT
Start: 2022-06-25 | End: 2022-06-27

## 2022-06-25 RX ORDER — SODIUM CHLORIDE 0.9 % (FLUSH) 0.9 %
10 SYRINGE (ML) INJECTION AS NEEDED
Status: DISCONTINUED | OUTPATIENT
Start: 2022-06-25 | End: 2022-06-25 | Stop reason: HOSPADM

## 2022-06-25 RX ORDER — ONDANSETRON 2 MG/ML
4 INJECTION INTRAMUSCULAR; INTRAVENOUS ONCE
Status: COMPLETED | OUTPATIENT
Start: 2022-06-25 | End: 2022-06-25

## 2022-06-25 RX ORDER — KETOROLAC TROMETHAMINE 10 MG/1
10 TABLET, FILM COATED ORAL EVERY 8 HOURS PRN
Qty: 15 TABLET | Refills: 0 | Status: SHIPPED | OUTPATIENT
Start: 2022-06-25 | End: 2022-07-01 | Stop reason: HOSPADM

## 2022-06-25 RX ADMIN — HYDROMORPHONE HYDROCHLORIDE 1 MG: 1 INJECTION, SOLUTION INTRAMUSCULAR; INTRAVENOUS; SUBCUTANEOUS at 17:03

## 2022-06-25 RX ADMIN — HYDROMORPHONE HYDROCHLORIDE 1 MG: 1 INJECTION, SOLUTION INTRAMUSCULAR; INTRAVENOUS; SUBCUTANEOUS at 15:31

## 2022-06-25 RX ADMIN — SODIUM CHLORIDE, POTASSIUM CHLORIDE, SODIUM LACTATE AND CALCIUM CHLORIDE 1000 ML: 600; 310; 30; 20 INJECTION, SOLUTION INTRAVENOUS at 15:30

## 2022-06-25 RX ADMIN — KETOROLAC TROMETHAMINE 15 MG: 15 INJECTION, SOLUTION INTRAMUSCULAR; INTRAVENOUS at 16:41

## 2022-06-25 RX ADMIN — ONDANSETRON 4 MG: 2 INJECTION INTRAMUSCULAR; INTRAVENOUS at 15:31

## 2022-06-29 ENCOUNTER — TELEPHONE (OUTPATIENT)
Dept: UROLOGY | Facility: CLINIC | Age: 52
End: 2022-06-29

## 2022-06-29 NOTE — TELEPHONE ENCOUNTER
Caller: KIYA GREGORY      Best call back number: [phone]    PATIent is needing: APPT ASAP. PT HAS A 12MM STONE AND IMAGING IS IN CHART.

## 2022-06-30 ENCOUNTER — TELEPHONE (OUTPATIENT)
Dept: UROLOGY | Facility: CLINIC | Age: 52
End: 2022-06-30

## 2022-06-30 ENCOUNTER — OFFICE VISIT (OUTPATIENT)
Dept: UROLOGY | Facility: CLINIC | Age: 52
End: 2022-06-30

## 2022-06-30 ENCOUNTER — HOSPITAL ENCOUNTER (OUTPATIENT)
Dept: GENERAL RADIOLOGY | Facility: HOSPITAL | Age: 52
Discharge: HOME | End: 2022-06-30

## 2022-06-30 ENCOUNTER — PRE-ADMISSION TESTING (OUTPATIENT)
Dept: PREADMISSION TESTING | Facility: HOSPITAL | Age: 52
End: 2022-06-30

## 2022-06-30 ENCOUNTER — LAB (OUTPATIENT)
Dept: LAB | Facility: HOSPITAL | Age: 52
End: 2022-06-30

## 2022-06-30 VITALS
SYSTOLIC BLOOD PRESSURE: 174 MMHG | BODY MASS INDEX: 40.43 KG/M2 | RESPIRATION RATE: 20 BRPM | HEIGHT: 74 IN | DIASTOLIC BLOOD PRESSURE: 92 MMHG | WEIGHT: 315 LBS | OXYGEN SATURATION: 96 % | HEART RATE: 74 BPM

## 2022-06-30 VITALS — TEMPERATURE: 97.1 F | HEIGHT: 74 IN | BODY MASS INDEX: 40.43 KG/M2 | WEIGHT: 315 LBS

## 2022-06-30 DIAGNOSIS — R10.9 LEFT FLANK PAIN: ICD-10-CM

## 2022-06-30 DIAGNOSIS — N20.1 LEFT URETERAL STONE: Primary | ICD-10-CM

## 2022-06-30 DIAGNOSIS — Z87.442 HISTORY OF KIDNEY STONES: ICD-10-CM

## 2022-06-30 DIAGNOSIS — N20.1 LEFT URETERAL STONE: ICD-10-CM

## 2022-06-30 LAB
ANION GAP SERPL CALCULATED.3IONS-SCNC: 8 MMOL/L (ref 5–15)
BILIRUB BLD-MCNC: NEGATIVE MG/DL
BUN SERPL-MCNC: 20 MG/DL (ref 6–20)
BUN/CREAT SERPL: 19.6 (ref 7–25)
CALCIUM SPEC-SCNC: 10.6 MG/DL (ref 8.6–10.5)
CHLORIDE SERPL-SCNC: 103 MMOL/L (ref 98–107)
CLARITY, POC: CLEAR
CO2 SERPL-SCNC: 29 MMOL/L (ref 22–29)
COLOR UR: YELLOW
CREAT SERPL-MCNC: 1.02 MG/DL (ref 0.76–1.27)
DEPRECATED RDW RBC AUTO: 45.1 FL (ref 37–54)
EGFRCR SERPLBLD CKD-EPI 2021: 89.5 ML/MIN/1.73
ERYTHROCYTE [DISTWIDTH] IN BLOOD BY AUTOMATED COUNT: 13.2 % (ref 12.3–15.4)
GLUCOSE SERPL-MCNC: 118 MG/DL (ref 65–99)
GLUCOSE UR STRIP-MCNC: NEGATIVE MG/DL
HCT VFR BLD AUTO: 43.2 % (ref 37.5–51)
HGB BLD-MCNC: 13.8 G/DL (ref 13–17.7)
INR PPP: 0.99 (ref 0.91–1.09)
KETONES UR QL: NEGATIVE
LEUKOCYTE EST, POC: ABNORMAL
MCH RBC QN AUTO: 29.3 PG (ref 26.6–33)
MCHC RBC AUTO-ENTMCNC: 31.9 G/DL (ref 31.5–35.7)
MCV RBC AUTO: 91.7 FL (ref 79–97)
NITRITE UR-MCNC: NEGATIVE MG/ML
PH UR: 5.5 [PH] (ref 5–8)
PLATELET # BLD AUTO: 197 10*3/MM3 (ref 140–450)
PMV BLD AUTO: 10.2 FL (ref 6–12)
POTASSIUM SERPL-SCNC: 4.5 MMOL/L (ref 3.5–5.2)
PROT UR STRIP-MCNC: NEGATIVE MG/DL
PROTHROMBIN TIME: 12.7 SECONDS (ref 11.9–14.6)
RBC # BLD AUTO: 4.71 10*6/MM3 (ref 4.14–5.8)
RBC # UR STRIP: ABNORMAL /UL
SARS-COV-2 RNA PNL SPEC NAA+PROBE: NOT DETECTED
SODIUM SERPL-SCNC: 140 MMOL/L (ref 136–145)
SP GR UR: 1.02 (ref 1–1.03)
UROBILINOGEN UR QL: NORMAL
WBC NRBC COR # BLD: 5.92 10*3/MM3 (ref 3.4–10.8)

## 2022-06-30 PROCEDURE — 93010 ELECTROCARDIOGRAM REPORT: CPT | Performed by: INTERNAL MEDICINE

## 2022-06-30 PROCEDURE — 36415 COLL VENOUS BLD VENIPUNCTURE: CPT

## 2022-06-30 PROCEDURE — 81001 URINALYSIS AUTO W/SCOPE: CPT | Performed by: PHYSICIAN ASSISTANT

## 2022-06-30 PROCEDURE — 93005 ELECTROCARDIOGRAM TRACING: CPT

## 2022-06-30 PROCEDURE — 99214 OFFICE O/P EST MOD 30 MIN: CPT | Performed by: PHYSICIAN ASSISTANT

## 2022-06-30 PROCEDURE — 87635 SARS-COV-2 COVID-19 AMP PRB: CPT

## 2022-06-30 PROCEDURE — C9803 HOPD COVID-19 SPEC COLLECT: HCPCS

## 2022-06-30 PROCEDURE — 80048 BASIC METABOLIC PNL TOTAL CA: CPT

## 2022-06-30 PROCEDURE — 74018 RADEX ABDOMEN 1 VIEW: CPT

## 2022-06-30 PROCEDURE — 85610 PROTHROMBIN TIME: CPT

## 2022-06-30 PROCEDURE — 85027 COMPLETE CBC AUTOMATED: CPT

## 2022-06-30 RX ORDER — ZINC GLUCONATE 50 MG
50 TABLET ORAL DAILY
COMMUNITY

## 2022-06-30 RX ORDER — EPINEPHRINE 0.3 MG/.3ML
1 INJECTION SUBCUTANEOUS
COMMUNITY

## 2022-06-30 RX ORDER — UREA 10 %
800 LOTION (ML) TOPICAL DAILY
COMMUNITY

## 2022-06-30 RX ORDER — HYDROCODONE BITARTRATE AND ACETAMINOPHEN 5; 325 MG/1; MG/1
5 TABLET ORAL EVERY 6 HOURS PRN
COMMUNITY
Start: 2022-06-28 | End: 2022-07-01 | Stop reason: HOSPADM

## 2022-06-30 RX ORDER — AMLODIPINE BESYLATE 5 MG/1
5 TABLET ORAL DAILY
COMMUNITY
Start: 2022-03-25

## 2022-06-30 RX ORDER — EZETIMIBE 10 MG/1
TABLET ORAL
COMMUNITY
End: 2022-06-30

## 2022-06-30 NOTE — PROGRESS NOTES
Subjective    Mr. Reddy is 51 y.o. male    Chief Complaint: er follow up kidney stones    History of Present Illness  Urolithiasis  Patient complains of left abdominal pain and flank pain without radiation to the groin. Onset of symptoms was abrupt starting 5 days ago with unchanged course since that time. Patient describes the pain as colicky and cramping, continuous and rated as severe. The patient has had nausea and no nausea. There has been no fever or chills. The patient is not complaining of dysuria, frequency, or urgency.  Previous management of stones includes ESWL and spontaneous passage.  Patient states he passed a few stones prior to the more severe episode requiring him to go to the emergency department.  CT scan showed a approximate 12 mm stone in the left proximal ureter.  Other small nonobstructing stones in both kidneys.    The following portions of the patient's history were reviewed and updated as appropriate: allergies, current medications, past family history, past medical history, past social history, past surgical history and problem list.    Review of Systems   Constitutional: Negative.    Gastrointestinal: Positive for abdominal pain and nausea.   Genitourinary: Positive for flank pain.         Current Outpatient Medications:   •  ascorbic acid (VITAMIN C) 1000 MG tablet, Take 1,000 mg by mouth Daily., Disp: , Rfl:   •  Cholecalciferol 25 MCG (1000 UT) capsule, Take 1,000 Units by mouth Daily., Disp: , Rfl:   •  cyclobenzaprine (FLEXERIL) 10 MG tablet, Take 10 mg by mouth 3 (Three) Times a Day As Needed for Muscle Spasms., Disp: , Rfl:   •  EPINEPHrine (EPIPEN) 0.3 MG/0.3ML solution auto-injector injection, Inject 0.3 mg into the appropriate muscle as directed by prescriber 1 (One) Time., Disp: , Rfl:   •  EPINEPHrine (EPIPEN) 0.3 MG/0.3ML solution auto-injector injection, Inject 1 syringe into the appropriate muscle as directed by prescriber., Disp: , Rfl:   •  ezetimibe (ZETIA) 10 MG tablet,  "Take  by mouth., Disp: , Rfl:   •  folic acid (FOLVITE) 800 MCG tablet, Take  by mouth., Disp: , Rfl:   •  ibuprofen (ADVIL,MOTRIN) 800 MG tablet, Take 800 mg by mouth Every 8 (Eight) Hours As Needed., Disp: , Rfl:   •  ketorolac (TORADOL) 10 MG tablet, Take 1 tablet by mouth Every 8 (Eight) Hours As Needed for Moderate Pain  for up to 5 days., Disp: 15 tablet, Rfl: 0  •  metFORMIN ER (GLUCOPHAGE-XR) 500 MG 24 hr tablet, Take 500 mg by mouth Daily With Breakfast., Disp: , Rfl:   •  PROAIR  (90 Base) MCG/ACT inhaler, Inhale 1 puff Every 4 (Four) Hours As Needed., Disp: , Rfl:   •  rosuvastatin (CRESTOR) 40 MG tablet, Take 40 mg by mouth Daily., Disp: , Rfl:   •  tamsulosin (FLOMAX) 0.4 MG capsule 24 hr capsule, , Disp: , Rfl:   •  zinc gluconate 50 MG tablet, Take  by mouth Daily., Disp: , Rfl:     Past Medical History:   Diagnosis Date   • COVID-19    • Diabetes mellitus (HCC)    • Injury of back    • Kidney stone        Past Surgical History:   Procedure Laterality Date   • BACK SURGERY      prior discectomies at L3-4 and L4-5 and a laminectomy at L5 and S1 ([facility] [facility])   • EXTRACORPOREAL SHOCK WAVE LITHOTRIPSY (ESWL)         Social History     Socioeconomic History   • Marital status:    Tobacco Use   • Smoking status: Former Smoker     Packs/day: 1.00     Years: 15.00     Pack years: 15.00   • Smokeless tobacco: Never Used   Vaping Use   • Vaping Use: Never used   Substance and Sexual Activity   • Alcohol use: No   • Drug use: No   • Sexual activity: Yes     Partners: Female       Family History   Problem Relation Age of Onset   • No Known Problems Father    • No Known Problems Mother        Objective    Temp 97.1 °F (36.2 °C)   Ht 188 cm (74\")   Wt (!) 166 kg (365 lb 9.6 oz)   BMI 46.94 kg/m²     Physical Exam  Vitals reviewed.   Constitutional:       Appearance: Normal appearance.   HENT:      Head: Normocephalic and atraumatic.      Nose: No congestion.   Pulmonary:      Effort: " Pulmonary effort is normal.   Abdominal:      Palpations: Abdomen is soft.      Tenderness: There is abdominal tenderness. There is left CVA tenderness.   Musculoskeletal:         General: Tenderness present.      Comments: Left mid back pain   Skin:     General: Skin is warm and dry.   Neurological:      Mental Status: He is alert and oriented to person, place, and time.   Psychiatric:         Mood and Affect: Mood normal.         Behavior: Behavior normal.             Results for orders placed or performed in visit on 06/30/22   POC Urinalysis Dipstick, Multipro    Specimen: Urine   Result Value Ref Range    Color Yellow Yellow, Straw, Dark Yellow, Liya    Clarity, UA Clear Clear    Glucose, UA Negative Negative mg/dL    Bilirubin Negative Negative    Ketones, UA Negative Negative    Specific Gravity  1.025 1.005 - 1.030    Blood, UA Small (A) Negative    pH, Urine 5.5 5.0 - 8.0    Protein, POC Negative Negative mg/dL    Urobilinogen, UA Normal Normal    Nitrite, UA Negative Negative    Leukocytes Trace (A) Negative     Independent review of a CT scan of abdomen and pelvis without contrast  The CT scan of the abdomen/pelvis done without contrast is available for me to review.  Treatment recommendations require an independent review.  First I scanned the liver, spleen, and bowel pattern.  The retroperitoneum including the major vessels and lymphatic packages are briefly reviewed.  This film as been reviewed by the radiologist to determine any non urologic abnormalities that are present.  The kidneys are closely inspected for size, symmetry, contour, parenchymal thickness, perinephric reaction, presence of calcifications, and intrarenal dilation of the collecting system.  The ureters are inspected for their course, caliber, and any calcifications.  The bladder is inspected for its thickness, size, and presence of any calcifications.  This scan shows approximate 12 mm stone in the left proximal ureter with mild  hydro on the left side proximal to the stone proximal to the stone.  Bilateral nonobstructing stones.    KUB independent review    A KUB is available for me to review today.  The image is inspected for a bowel gas pattern and the general bone structure of the spine and pelvis. The kidneys are then inspected closely.  Renal outline is noted if identifiable. The kidney, collecting system, and anticipated path of the ureter are examined for calcifications including those in the true pelvis.  This film reveals:    On the right there are no calcificaitons seen in the kidney or the expected course of the ureter. .    On the left there is a single mid-ureteral stone measuring 10 mm.      Assessment and Plan    Diagnoses and all orders for this visit:    1. Left ureteral stone (Primary)  -     XR abdomen kub; Future  -     POC Urinalysis Dipstick, Multipro  -     Case Request; Standing  -     COVID PRE-OP / PRE-PROCEDURE SCREENING ORDER (NO ISOLATION) - Swab, Nasopharynx; Future  -     Protime-INR; Future  -     Case Request    2. Left flank pain  -     Case Request; Standing  -     COVID PRE-OP / PRE-PROCEDURE SCREENING ORDER (NO ISOLATION) - Swab, Nasopharynx; Future  -     Protime-INR; Future  -     Case Request    Other orders  -     Follow Anesthesia Guidelines / Standing Orders; Future  -     Provide NPO Instructions to Patient; Future    Patient with previous history of kidney stones who has not had any intervention for several years he has had previous ESWL he has not seen Dr. Stewart since 2018 he presents today after going to the emergency department 06/25/2022 with severe left flank left abdominal pain.  CT scan showed an approximate 12 mm stone at the described in the left UPJ but is more in the left proximal ureter stone is clearly visualized on the KUB today at approximately L3-L4 level.  After discussion and due to the ongoing severity of his symptoms which are controlled with Toradol and pain medication we will  schedule him tomorrow for left ESWL possible cystoscopy stent placement which I told him he may have to help pass the stones or if the stone does not break up.  After discussion he will be scheduled tomorrow with Dr. Pulido for left ESWL possible cystoscopy stent.

## 2022-06-30 NOTE — TELEPHONE ENCOUNTER
Per Basim MORALES, to call  Patient to tell patient to get imaging one hour prior to their appointment. I call the patient and lmvm to have this done

## 2022-07-01 ENCOUNTER — ANESTHESIA EVENT (OUTPATIENT)
Dept: PERIOP | Facility: HOSPITAL | Age: 52
End: 2022-07-01

## 2022-07-01 ENCOUNTER — ANESTHESIA (OUTPATIENT)
Dept: PERIOP | Facility: HOSPITAL | Age: 52
End: 2022-07-01

## 2022-07-01 ENCOUNTER — HOSPITAL ENCOUNTER (OUTPATIENT)
Facility: HOSPITAL | Age: 52
Setting detail: HOSPITAL OUTPATIENT SURGERY
Discharge: HOME | End: 2022-07-01
Attending: UROLOGY | Admitting: UROLOGY

## 2022-07-01 ENCOUNTER — APPOINTMENT (OUTPATIENT)
Dept: GENERAL RADIOLOGY | Facility: HOSPITAL | Age: 52
End: 2022-07-01

## 2022-07-01 VITALS
SYSTOLIC BLOOD PRESSURE: 149 MMHG | TEMPERATURE: 97.2 F | RESPIRATION RATE: 16 BRPM | DIASTOLIC BLOOD PRESSURE: 70 MMHG | OXYGEN SATURATION: 94 % | HEART RATE: 63 BPM

## 2022-07-01 DIAGNOSIS — N20.1 LEFT URETERAL STONE: ICD-10-CM

## 2022-07-01 DIAGNOSIS — R10.9 LEFT FLANK PAIN: ICD-10-CM

## 2022-07-01 LAB
GLUCOSE BLDC GLUCOMTR-MCNC: 102 MG/DL (ref 70–130)
GLUCOSE BLDC GLUCOMTR-MCNC: 120 MG/DL (ref 70–130)

## 2022-07-01 PROCEDURE — 25010000002 FENTANYL CITRATE (PF) 100 MCG/2ML SOLUTION: Performed by: NURSE ANESTHETIST, CERTIFIED REGISTERED

## 2022-07-01 PROCEDURE — 74018 RADEX ABDOMEN 1 VIEW: CPT

## 2022-07-01 PROCEDURE — 25010000002 ONDANSETRON PER 1 MG: Performed by: NURSE ANESTHETIST, CERTIFIED REGISTERED

## 2022-07-01 PROCEDURE — 82962 GLUCOSE BLOOD TEST: CPT

## 2022-07-01 PROCEDURE — 50590 FRAGMENTING OF KIDNEY STONE: CPT | Performed by: UROLOGY

## 2022-07-01 PROCEDURE — 25010000002 PROPOFOL 10 MG/ML EMULSION: Performed by: NURSE ANESTHETIST, CERTIFIED REGISTERED

## 2022-07-01 PROCEDURE — 25010000002 FUROSEMIDE PER 20 MG: Performed by: NURSE ANESTHETIST, CERTIFIED REGISTERED

## 2022-07-01 PROCEDURE — 25010000002 CEFAZOLIN PER 500 MG: Performed by: PHYSICIAN ASSISTANT

## 2022-07-01 RX ORDER — FENTANYL CITRATE 50 UG/ML
INJECTION, SOLUTION INTRAMUSCULAR; INTRAVENOUS AS NEEDED
Status: DISCONTINUED | OUTPATIENT
Start: 2022-07-01 | End: 2022-07-01 | Stop reason: SURG

## 2022-07-01 RX ORDER — FLUMAZENIL 0.1 MG/ML
0.2 INJECTION INTRAVENOUS AS NEEDED
Status: DISCONTINUED | OUTPATIENT
Start: 2022-07-01 | End: 2022-07-01 | Stop reason: HOSPADM

## 2022-07-01 RX ORDER — ONDANSETRON 2 MG/ML
INJECTION INTRAMUSCULAR; INTRAVENOUS AS NEEDED
Status: DISCONTINUED | OUTPATIENT
Start: 2022-07-01 | End: 2022-07-01 | Stop reason: SURG

## 2022-07-01 RX ORDER — ONDANSETRON 4 MG/1
4 TABLET, ORALLY DISINTEGRATING ORAL EVERY 6 HOURS PRN
Qty: 6 TABLET | Refills: 1 | Status: SHIPPED | OUTPATIENT
Start: 2022-07-01 | End: 2022-08-05

## 2022-07-01 RX ORDER — OXYCODONE AND ACETAMINOPHEN 10; 325 MG/1; MG/1
1 TABLET ORAL ONCE AS NEEDED
Status: DISCONTINUED | OUTPATIENT
Start: 2022-07-01 | End: 2022-07-01 | Stop reason: HOSPADM

## 2022-07-01 RX ORDER — LABETALOL HYDROCHLORIDE 5 MG/ML
5 INJECTION, SOLUTION INTRAVENOUS
Status: DISCONTINUED | OUTPATIENT
Start: 2022-07-01 | End: 2022-07-01 | Stop reason: HOSPADM

## 2022-07-01 RX ORDER — DROPERIDOL 2.5 MG/ML
0.62 INJECTION, SOLUTION INTRAMUSCULAR; INTRAVENOUS ONCE AS NEEDED
Status: DISCONTINUED | OUTPATIENT
Start: 2022-07-01 | End: 2022-07-01 | Stop reason: HOSPADM

## 2022-07-01 RX ORDER — ACETAMINOPHEN 500 MG
1000 TABLET ORAL ONCE
Status: COMPLETED | OUTPATIENT
Start: 2022-07-01 | End: 2022-07-01

## 2022-07-01 RX ORDER — SODIUM CHLORIDE 0.9 % (FLUSH) 0.9 %
3 SYRINGE (ML) INJECTION AS NEEDED
Status: DISCONTINUED | OUTPATIENT
Start: 2022-07-01 | End: 2022-07-01 | Stop reason: HOSPADM

## 2022-07-01 RX ORDER — ONDANSETRON 2 MG/ML
4 INJECTION INTRAMUSCULAR; INTRAVENOUS ONCE AS NEEDED
Status: DISCONTINUED | OUTPATIENT
Start: 2022-07-01 | End: 2022-07-01 | Stop reason: HOSPADM

## 2022-07-01 RX ORDER — SODIUM CHLORIDE 0.9 % (FLUSH) 0.9 %
3 SYRINGE (ML) INJECTION EVERY 12 HOURS SCHEDULED
Status: DISCONTINUED | OUTPATIENT
Start: 2022-07-01 | End: 2022-07-01 | Stop reason: HOSPADM

## 2022-07-01 RX ORDER — MIDAZOLAM HYDROCHLORIDE 1 MG/ML
1 INJECTION INTRAMUSCULAR; INTRAVENOUS
Status: DISCONTINUED | OUTPATIENT
Start: 2022-07-01 | End: 2022-07-01 | Stop reason: HOSPADM

## 2022-07-01 RX ORDER — NALOXONE HCL 0.4 MG/ML
0.4 VIAL (ML) INJECTION AS NEEDED
Status: DISCONTINUED | OUTPATIENT
Start: 2022-07-01 | End: 2022-07-01 | Stop reason: HOSPADM

## 2022-07-01 RX ORDER — LIDOCAINE HYDROCHLORIDE 10 MG/ML
0.5 INJECTION, SOLUTION EPIDURAL; INFILTRATION; INTRACAUDAL; PERINEURAL ONCE AS NEEDED
Status: DISCONTINUED | OUTPATIENT
Start: 2022-07-01 | End: 2022-07-01 | Stop reason: HOSPADM

## 2022-07-01 RX ORDER — ALBUTEROL SULFATE 90 UG/1
AEROSOL, METERED RESPIRATORY (INHALATION) AS NEEDED
Status: DISCONTINUED | OUTPATIENT
Start: 2022-07-01 | End: 2022-07-01 | Stop reason: SURG

## 2022-07-01 RX ORDER — FENTANYL CITRATE 50 UG/ML
25 INJECTION, SOLUTION INTRAMUSCULAR; INTRAVENOUS
Status: DISCONTINUED | OUTPATIENT
Start: 2022-07-01 | End: 2022-07-01 | Stop reason: HOSPADM

## 2022-07-01 RX ORDER — HYDROCODONE BITARTRATE AND ACETAMINOPHEN 7.5; 325 MG/1; MG/1
1 TABLET ORAL ONCE AS NEEDED
Status: DISCONTINUED | OUTPATIENT
Start: 2022-07-01 | End: 2022-07-01 | Stop reason: HOSPADM

## 2022-07-01 RX ORDER — CEFAZOLIN SODIUM IN 0.9 % NACL 3 G/100 ML
3 INTRAVENOUS SOLUTION, PIGGYBACK (ML) INTRAVENOUS ONCE
Status: COMPLETED | OUTPATIENT
Start: 2022-07-01 | End: 2022-07-01

## 2022-07-01 RX ORDER — PROPOFOL 10 MG/ML
VIAL (ML) INTRAVENOUS AS NEEDED
Status: DISCONTINUED | OUTPATIENT
Start: 2022-07-01 | End: 2022-07-01 | Stop reason: SURG

## 2022-07-01 RX ORDER — SODIUM CHLORIDE 0.9 % (FLUSH) 0.9 %
3-10 SYRINGE (ML) INJECTION AS NEEDED
Status: DISCONTINUED | OUTPATIENT
Start: 2022-07-01 | End: 2022-07-01 | Stop reason: HOSPADM

## 2022-07-01 RX ORDER — TAMSULOSIN HYDROCHLORIDE 0.4 MG/1
1 CAPSULE ORAL DAILY
Qty: 30 CAPSULE | Refills: 1 | Status: SHIPPED | OUTPATIENT
Start: 2022-07-01

## 2022-07-01 RX ORDER — HYDROCODONE BITARTRATE AND ACETAMINOPHEN 7.5; 325 MG/1; MG/1
1 TABLET ORAL EVERY 6 HOURS PRN
Qty: 18 TABLET | Refills: 0 | Status: ON HOLD | OUTPATIENT
Start: 2022-07-01 | End: 2022-07-25

## 2022-07-01 RX ORDER — FUROSEMIDE 10 MG/ML
INJECTION INTRAMUSCULAR; INTRAVENOUS AS NEEDED
Status: DISCONTINUED | OUTPATIENT
Start: 2022-07-01 | End: 2022-07-01 | Stop reason: SURG

## 2022-07-01 RX ORDER — SODIUM CHLORIDE, SODIUM LACTATE, POTASSIUM CHLORIDE, CALCIUM CHLORIDE 600; 310; 30; 20 MG/100ML; MG/100ML; MG/100ML; MG/100ML
1000 INJECTION, SOLUTION INTRAVENOUS CONTINUOUS
Status: DISCONTINUED | OUTPATIENT
Start: 2022-07-01 | End: 2022-07-01 | Stop reason: HOSPADM

## 2022-07-01 RX ORDER — SCOLOPAMINE TRANSDERMAL SYSTEM 1 MG/1
1 PATCH, EXTENDED RELEASE TRANSDERMAL ONCE
Status: DISCONTINUED | OUTPATIENT
Start: 2022-07-01 | End: 2022-07-01 | Stop reason: HOSPADM

## 2022-07-01 RX ORDER — PHENYLEPHRINE HCL IN 0.9% NACL 1 MG/10 ML
SYRINGE (ML) INTRAVENOUS AS NEEDED
Status: DISCONTINUED | OUTPATIENT
Start: 2022-07-01 | End: 2022-07-01 | Stop reason: SURG

## 2022-07-01 RX ORDER — IBUPROFEN 600 MG/1
600 TABLET ORAL ONCE AS NEEDED
Status: DISCONTINUED | OUTPATIENT
Start: 2022-07-01 | End: 2022-07-01 | Stop reason: HOSPADM

## 2022-07-01 RX ORDER — OXYCODONE AND ACETAMINOPHEN 7.5; 325 MG/1; MG/1
2 TABLET ORAL EVERY 4 HOURS PRN
Status: DISCONTINUED | OUTPATIENT
Start: 2022-07-01 | End: 2022-07-01 | Stop reason: HOSPADM

## 2022-07-01 RX ORDER — SODIUM CHLORIDE, SODIUM LACTATE, POTASSIUM CHLORIDE, CALCIUM CHLORIDE 600; 310; 30; 20 MG/100ML; MG/100ML; MG/100ML; MG/100ML
100 INJECTION, SOLUTION INTRAVENOUS CONTINUOUS
Status: DISCONTINUED | OUTPATIENT
Start: 2022-07-01 | End: 2022-07-01 | Stop reason: HOSPADM

## 2022-07-01 RX ORDER — ROCURONIUM BROMIDE 10 MG/ML
INJECTION, SOLUTION INTRAVENOUS AS NEEDED
Status: DISCONTINUED | OUTPATIENT
Start: 2022-07-01 | End: 2022-07-01 | Stop reason: SURG

## 2022-07-01 RX ADMIN — GLYCOPYRROLATE 0.2 MG: 0.2 INJECTION INTRAMUSCULAR; INTRAVENOUS at 13:38

## 2022-07-01 RX ADMIN — ALBUTEROL SULFATE 4 PUFF: 90 AEROSOL, METERED RESPIRATORY (INHALATION) at 14:20

## 2022-07-01 RX ADMIN — ROCURONIUM BROMIDE 30 MG: 10 SOLUTION INTRAVENOUS at 13:17

## 2022-07-01 RX ADMIN — SODIUM CHLORIDE, POTASSIUM CHLORIDE, SODIUM LACTATE AND CALCIUM CHLORIDE 1000 ML: 600; 310; 30; 20 INJECTION, SOLUTION INTRAVENOUS at 10:46

## 2022-07-01 RX ADMIN — FUROSEMIDE 10 MG: 10 INJECTION, SOLUTION INTRAMUSCULAR; INTRAVENOUS at 13:20

## 2022-07-01 RX ADMIN — ACETAMINOPHEN 1000 MG: 500 TABLET, FILM COATED ORAL at 12:29

## 2022-07-01 RX ADMIN — PROPOFOL 200 MG: 10 INJECTION, EMULSION INTRAVENOUS at 13:17

## 2022-07-01 RX ADMIN — Medication 3 G: at 13:18

## 2022-07-01 RX ADMIN — Medication 200 MCG: at 13:42

## 2022-07-01 RX ADMIN — Medication 100 MCG: at 13:38

## 2022-07-01 RX ADMIN — FENTANYL CITRATE 100 MCG: 50 INJECTION, SOLUTION INTRAMUSCULAR; INTRAVENOUS at 13:17

## 2022-07-01 RX ADMIN — ONDANSETRON 4 MG: 2 INJECTION INTRAMUSCULAR; INTRAVENOUS at 13:27

## 2022-07-01 RX ADMIN — SCOPALAMINE 1 PATCH: 1 PATCH, EXTENDED RELEASE TRANSDERMAL at 12:29

## 2022-07-01 RX ADMIN — Medication 100 MCG: at 14:00

## 2022-07-01 NOTE — ANESTHESIA POSTPROCEDURE EVALUATION
Patient: Randell Reddy    Procedure Summary     Date: 07/01/22 Room / Location:  [facility] OR 09 /  [facility] OR    Anesthesia Start: 1314 Anesthesia Stop: 1428    Procedure: EXTRACORPOREAL SHOCKWAVE LITHOTRIPSY LEFT (Left ) Diagnosis:       Left ureteral stone      Left flank pain      (Left ureteral stone [N20.1])      (Left flank pain [R10.9])    Surgeons: Lawson Pulido MD Provider: Alexis Cleaning CRNA    Anesthesia Type: general ASA Status: 3          Anesthesia Type: general    Vitals  Vitals Value Taken Time   /80 07/01/22 1450   Temp 97.2 °F (36.2 °C) 07/01/22 1445   Pulse 66 07/01/22 1452   Resp 14 07/01/22 1445   SpO2 94 % 07/01/22 1452   Vitals shown include unvalidated device data.        Post Anesthesia Care and Evaluation    Patient location during evaluation: PACU  Patient participation: complete - patient participated  Level of consciousness: awake and alert  Pain management: adequate    Airway patency: patent  Anesthetic complications: No anesthetic complications  PONV Status: none  Cardiovascular status: acceptable and hemodynamically stable  Respiratory status: acceptable  Hydration status: acceptable    Comments: Blood pressure 154/61, pulse 64, temperature 97.2 °F (36.2 °C), resp. rate 16, SpO2 94 %.    Patient discharged from PACU based upon Tess score. Please see RN notes for further details

## 2022-07-01 NOTE — ANESTHESIA PREPROCEDURE EVALUATION
" Anesthesia Evaluation     Patient summary reviewed   history of anesthetic complications (lots of \"shaking\" after prior anesthestic ):  NPO Solid Status: > 6 hours             Airway   Mallampati: II  TM distance: >3 FB  Neck ROM: full  Large neck circumference  Dental - normal exam     Pulmonary    (+) a smoker Former, sleep apnea on CPAP,   Cardiovascular   Exercise tolerance: good (4-7 METS)    (+) hypertension, hyperlipidemia,   (-) pacemaker, valvular problems/murmurs, past MI, cardiac stents, CABG      Neuro/Psych  (-) seizures, CVA  GI/Hepatic/Renal/Endo    (+) morbid obesity,  renal disease stones, diabetes mellitus,     Musculoskeletal     Abdominal   (+) obese,    Substance History      OB/GYN          Other   arthritis,                    Anesthesia Plan    ASA 3     general     (12 mm stone in the left proximal ureter with mild hydro on the left side proximal to the stone proximal to the stone.  Bilateral nonobstructing stones)  intravenous induction     Anesthetic plan, risks, benefits, and alternatives have been provided, discussed and informed consent has been obtained with: patient.        CODE STATUS:       "

## 2022-07-01 NOTE — OP NOTE
OP NOTE  Randell Reddy    Date of Procedure: 7/1/2022    Pre-op Diagnosis:   Left ureteral stone [N20.1]  Left flank pain [R10.9]    Post-op Diagnosis:     Post-Op Diagnosis Codes:     * Left ureteral stone [N20.1]     * Left flank pain [R10.9]    Procedure/CPT® Codes:      Procedure(s):  LEFT EXTRACORPOREAL SHOCKWAVE LITHOTRIPSY     Surgeon(s):  Lawson Pulido MD    Anesthesia: General    Staff:   Circulator: Gillian Moe RN; Coleen Nair RN  Scrub Person: Sandra Molina  Vendor Representative: Tobi Toure    Indications:   51-year-old male with 1 cm left proximal ureteral stone that failed to pass.  He wished to avoid a ureteral stent if at all possible.  After discussion of options, patient has given informed consent to undergo left ESWL.  All risks, benefits, and alternatives were discussed.    Operative Report: The patient is identified. The KUB is reviewed. After answering any questions, patient was brought to the operating room and placed on the patient table of the [facility].  General endotracheal anesthesia was administered.  Preoperative KUB was reviewed.   An [facility] c-arm fluoroscope was used to identify the stone.    The shock-head is brought into position.  This stone is brought into the F2 plane for focus.  Treatment was initiated.  We gradually increased the energy level from 1 to 7.  This stone was treated at a gated rate due to some ectopy noted on his monitor early in the case.  We paused for 2 minutes after 300 shocks to reduce risk of renal damage.  The stone was periodically checked to make sure that we were on it and getting good breakup.  We checked at 700, 1000, 1500, 2000, and 2500 shocks.  The stone did have good breakup.  I felt it was unnecessary to place a ureteral stent.  At the conclusion of 3000 shocks, the patient was awakened from anesthesia and transferred to the recovery room in satisfactory condition.      Estimated Blood Loss: <30 mL    Specimens:                 None      Drains: * No LDAs found *    Complications: none    Lawson Pulido MD     Date: 7/1/2022  Time: 13:40 CDT

## 2022-07-01 NOTE — ANESTHESIA PROCEDURE NOTES
Airway  Date/Time: 7/1/2022 1:17 PM  Airway not difficult    General Information and Staff    Patient location during procedure: OR  CRNA/CAA: Alexis Cleaning CRNA    Indications and Patient Condition  Indications for airway management: airway protection    Preoxygenated: yes  Mask difficulty assessment: 1 - vent by mask    Final Airway Details  Final airway type: endotracheal airway      Successful airway: ETT  Cuffed: yes   Successful intubation technique: video laryngoscopy  Facilitating devices/methods: Bougie  Endotracheal tube insertion site: oral  Blade: Silver  Blade size: 3  ETT size (mm): 7.5  Cormack-Lehane Classification: grade I - full view of glottis  Placement verified by: chest auscultation and capnometry   Number of attempts at approach: 1  Assessment: lips, teeth, and gum same as pre-op and atraumatic intubation

## 2022-07-03 LAB
QT INTERVAL: 408 MS
QTC INTERVAL: 424 MS

## 2022-07-25 ENCOUNTER — HOSPITAL ENCOUNTER (OUTPATIENT)
Facility: HOSPITAL | Age: 52
Setting detail: OBSERVATION
Discharge: HOME | End: 2022-07-27
Attending: INTERNAL MEDICINE | Admitting: UROLOGY

## 2022-07-25 ENCOUNTER — APPOINTMENT (OUTPATIENT)
Dept: CT IMAGING | Facility: HOSPITAL | Age: 52
End: 2022-07-25

## 2022-07-25 ENCOUNTER — APPOINTMENT (OUTPATIENT)
Dept: GENERAL RADIOLOGY | Facility: HOSPITAL | Age: 52
End: 2022-07-25

## 2022-07-25 DIAGNOSIS — N20.1 URETEROLITHIASIS: Primary | ICD-10-CM

## 2022-07-25 DIAGNOSIS — N20.1 LEFT URETERAL STONE: ICD-10-CM

## 2022-07-25 DIAGNOSIS — R07.9 CHEST PAIN, UNSPECIFIED TYPE: ICD-10-CM

## 2022-07-25 DIAGNOSIS — N13.2 URETERAL STONE WITH HYDRONEPHROSIS: ICD-10-CM

## 2022-07-25 PROBLEM — N13.9 OBSTRUCTIVE UROPATHY: Status: ACTIVE | Noted: 2022-07-25

## 2022-07-25 PROBLEM — E66.813 OBESITY, CLASS III, BMI 40-49.9 (MORBID OBESITY): Status: ACTIVE | Noted: 2019-09-19

## 2022-07-25 PROBLEM — I10 ESSENTIAL HYPERTENSION: Status: ACTIVE | Noted: 2022-07-25

## 2022-07-25 PROBLEM — E11.65 TYPE 2 DIABETES MELLITUS WITH HYPERGLYCEMIA, WITHOUT LONG-TERM CURRENT USE OF INSULIN: Status: ACTIVE | Noted: 2022-07-25

## 2022-07-25 PROBLEM — N17.9 AKI (ACUTE KIDNEY INJURY): Status: ACTIVE | Noted: 2022-07-25

## 2022-07-25 LAB
ALBUMIN SERPL-MCNC: 4.3 G/DL (ref 3.5–5.2)
ALBUMIN/GLOB SERPL: 1.5 G/DL
ALP SERPL-CCNC: 77 U/L (ref 39–117)
ALT SERPL W P-5'-P-CCNC: 54 U/L (ref 1–41)
ANION GAP SERPL CALCULATED.3IONS-SCNC: 12 MMOL/L (ref 5–15)
APTT PPP: 24.7 SECONDS (ref 24.1–35)
AST SERPL-CCNC: 32 U/L (ref 1–40)
BACTERIA UR QL AUTO: ABNORMAL /HPF
BASOPHILS # BLD AUTO: 0.04 10*3/MM3 (ref 0–0.2)
BASOPHILS NFR BLD AUTO: 0.4 % (ref 0–1.5)
BILIRUB SERPL-MCNC: 0.4 MG/DL (ref 0–1.2)
BILIRUB UR QL STRIP: NEGATIVE
BUN SERPL-MCNC: 37 MG/DL (ref 6–20)
BUN/CREAT SERPL: 28.7 (ref 7–25)
CALCIUM SPEC-SCNC: 10 MG/DL (ref 8.6–10.5)
CHLORIDE SERPL-SCNC: 104 MMOL/L (ref 98–107)
CLARITY UR: CLEAR
CO2 SERPL-SCNC: 23 MMOL/L (ref 22–29)
COLOR UR: YELLOW
CREAT SERPL-MCNC: 1.29 MG/DL (ref 0.76–1.27)
D DIMER PPP FEU-MCNC: 0.82 MCGFEU/ML (ref 0–0.5)
DEPRECATED RDW RBC AUTO: 45.5 FL (ref 37–54)
EGFRCR SERPLBLD CKD-EPI 2021: 67.5 ML/MIN/1.73
EOSINOPHIL # BLD AUTO: 0.05 10*3/MM3 (ref 0–0.4)
EOSINOPHIL NFR BLD AUTO: 0.5 % (ref 0.3–6.2)
ERYTHROCYTE [DISTWIDTH] IN BLOOD BY AUTOMATED COUNT: 13.2 % (ref 12.3–15.4)
GLOBULIN UR ELPH-MCNC: 2.8 GM/DL
GLUCOSE BLDC GLUCOMTR-MCNC: 146 MG/DL (ref 70–130)
GLUCOSE SERPL-MCNC: 167 MG/DL (ref 65–99)
GLUCOSE UR STRIP-MCNC: NEGATIVE MG/DL
HCT VFR BLD AUTO: 37.5 % (ref 37.5–51)
HGB BLD-MCNC: 11.6 G/DL (ref 13–17.7)
HGB UR QL STRIP.AUTO: NEGATIVE
HOLD SPECIMEN: NORMAL
HYALINE CASTS UR QL AUTO: ABNORMAL /LPF
IMM GRANULOCYTES # BLD AUTO: 0.05 10*3/MM3 (ref 0–0.05)
IMM GRANULOCYTES NFR BLD AUTO: 0.5 % (ref 0–0.5)
INR PPP: 1.1 (ref 0.91–1.09)
KETONES UR QL STRIP: ABNORMAL
LEUKOCYTE ESTERASE UR QL STRIP.AUTO: ABNORMAL
LYMPHOCYTES # BLD AUTO: 1.66 10*3/MM3 (ref 0.7–3.1)
LYMPHOCYTES NFR BLD AUTO: 17.2 % (ref 19.6–45.3)
MCH RBC QN AUTO: 28.8 PG (ref 26.6–33)
MCHC RBC AUTO-ENTMCNC: 30.9 G/DL (ref 31.5–35.7)
MCV RBC AUTO: 93.1 FL (ref 79–97)
MONOCYTES # BLD AUTO: 0.54 10*3/MM3 (ref 0.1–0.9)
MONOCYTES NFR BLD AUTO: 5.6 % (ref 5–12)
MUCOUS THREADS URNS QL MICRO: ABNORMAL /HPF
NEUTROPHILS NFR BLD AUTO: 7.29 10*3/MM3 (ref 1.7–7)
NEUTROPHILS NFR BLD AUTO: 75.8 % (ref 42.7–76)
NITRITE UR QL STRIP: NEGATIVE
NRBC BLD AUTO-RTO: 0 /100 WBC (ref 0–0.2)
PH UR STRIP.AUTO: 6 [PH] (ref 5–8)
PLATELET # BLD AUTO: 216 10*3/MM3 (ref 140–450)
PMV BLD AUTO: 10.7 FL (ref 6–12)
POTASSIUM SERPL-SCNC: 5.2 MMOL/L (ref 3.5–5.2)
PROT SERPL-MCNC: 7.1 G/DL (ref 6–8.5)
PROT UR QL STRIP: ABNORMAL
PROTHROMBIN TIME: 13.7 SECONDS (ref 11.9–14.6)
RBC # BLD AUTO: 4.03 10*6/MM3 (ref 4.14–5.8)
RBC # UR STRIP: ABNORMAL /HPF
REF LAB TEST METHOD: ABNORMAL
SARS-COV-2 RNA PNL SPEC NAA+PROBE: NOT DETECTED
SODIUM SERPL-SCNC: 139 MMOL/L (ref 136–145)
SP GR UR STRIP: 1.02 (ref 1–1.03)
SQUAMOUS #/AREA URNS HPF: ABNORMAL /HPF
TROPONIN T SERPL-MCNC: <0.01 NG/ML (ref 0–0.03)
TROPONIN T SERPL-MCNC: <0.01 NG/ML (ref 0–0.03)
UROBILINOGEN UR QL STRIP: ABNORMAL
WBC # UR STRIP: ABNORMAL /HPF
WBC NRBC COR # BLD: 9.63 10*3/MM3 (ref 3.4–10.8)
WHOLE BLOOD HOLD COAG: NORMAL
WHOLE BLOOD HOLD SPECIMEN: NORMAL

## 2022-07-25 PROCEDURE — 84484 ASSAY OF TROPONIN QUANT: CPT | Performed by: NURSE PRACTITIONER

## 2022-07-25 PROCEDURE — G0378 HOSPITAL OBSERVATION PER HR: HCPCS

## 2022-07-25 PROCEDURE — 84484 ASSAY OF TROPONIN QUANT: CPT | Performed by: INTERNAL MEDICINE

## 2022-07-25 PROCEDURE — 85025 COMPLETE CBC W/AUTO DIFF WBC: CPT | Performed by: NURSE PRACTITIONER

## 2022-07-25 PROCEDURE — 85027 COMPLETE CBC AUTOMATED: CPT | Performed by: INTERNAL MEDICINE

## 2022-07-25 PROCEDURE — 96374 THER/PROPH/DIAG INJ IV PUSH: CPT

## 2022-07-25 PROCEDURE — 80053 COMPREHEN METABOLIC PANEL: CPT | Performed by: NURSE PRACTITIONER

## 2022-07-25 PROCEDURE — 25010000002 ONDANSETRON PER 1 MG: Performed by: NURSE PRACTITIONER

## 2022-07-25 PROCEDURE — 36415 COLL VENOUS BLD VENIPUNCTURE: CPT

## 2022-07-25 PROCEDURE — 74176 CT ABD & PELVIS W/O CONTRAST: CPT

## 2022-07-25 PROCEDURE — 0 HYDROMORPHONE 1 MG/ML SOLUTION: Performed by: NURSE PRACTITIONER

## 2022-07-25 PROCEDURE — 85730 THROMBOPLASTIN TIME PARTIAL: CPT | Performed by: NURSE PRACTITIONER

## 2022-07-25 PROCEDURE — 96375 TX/PRO/DX INJ NEW DRUG ADDON: CPT

## 2022-07-25 PROCEDURE — 0 IOPAMIDOL PER 1 ML: Performed by: NURSE PRACTITIONER

## 2022-07-25 PROCEDURE — 99285 EMERGENCY DEPT VISIT HI MDM: CPT

## 2022-07-25 PROCEDURE — 85379 FIBRIN DEGRADATION QUANT: CPT | Performed by: NURSE PRACTITIONER

## 2022-07-25 PROCEDURE — 71045 X-RAY EXAM CHEST 1 VIEW: CPT

## 2022-07-25 PROCEDURE — 85610 PROTHROMBIN TIME: CPT | Performed by: NURSE PRACTITIONER

## 2022-07-25 PROCEDURE — 93010 ELECTROCARDIOGRAM REPORT: CPT | Performed by: INTERNAL MEDICINE

## 2022-07-25 PROCEDURE — 96361 HYDRATE IV INFUSION ADD-ON: CPT

## 2022-07-25 PROCEDURE — 82962 GLUCOSE BLOOD TEST: CPT

## 2022-07-25 PROCEDURE — 81001 URINALYSIS AUTO W/SCOPE: CPT | Performed by: NURSE PRACTITIONER

## 2022-07-25 PROCEDURE — 87635 SARS-COV-2 COVID-19 AMP PRB: CPT | Performed by: NURSE PRACTITIONER

## 2022-07-25 PROCEDURE — 96376 TX/PRO/DX INJ SAME DRUG ADON: CPT

## 2022-07-25 PROCEDURE — 93005 ELECTROCARDIOGRAM TRACING: CPT | Performed by: NURSE PRACTITIONER

## 2022-07-25 PROCEDURE — 93005 ELECTROCARDIOGRAM TRACING: CPT | Performed by: FAMILY MEDICINE

## 2022-07-25 PROCEDURE — 36415 COLL VENOUS BLD VENIPUNCTURE: CPT | Performed by: INTERNAL MEDICINE

## 2022-07-25 PROCEDURE — 71275 CT ANGIOGRAPHY CHEST: CPT

## 2022-07-25 PROCEDURE — C9803 HOPD COVID-19 SPEC COLLECT: HCPCS

## 2022-07-25 PROCEDURE — 0 HYDROMORPHONE 1 MG/ML SOLUTION: Performed by: INTERNAL MEDICINE

## 2022-07-25 RX ORDER — AMLODIPINE BESYLATE 5 MG/1
2.5 TABLET ORAL DAILY
Status: DISCONTINUED | OUTPATIENT
Start: 2022-07-26 | End: 2022-07-27 | Stop reason: HOSPADM

## 2022-07-25 RX ORDER — TAMSULOSIN HYDROCHLORIDE 0.4 MG/1
0.4 CAPSULE ORAL NIGHTLY
Status: DISCONTINUED | OUTPATIENT
Start: 2022-07-25 | End: 2022-07-27 | Stop reason: HOSPADM

## 2022-07-25 RX ORDER — ONDANSETRON 2 MG/ML
4 INJECTION INTRAMUSCULAR; INTRAVENOUS EVERY 6 HOURS PRN
Status: DISCONTINUED | OUTPATIENT
Start: 2022-07-25 | End: 2022-07-27 | Stop reason: HOSPADM

## 2022-07-25 RX ORDER — SODIUM CHLORIDE 9 MG/ML
100 INJECTION, SOLUTION INTRAVENOUS CONTINUOUS
Status: DISCONTINUED | OUTPATIENT
Start: 2022-07-25 | End: 2022-07-27 | Stop reason: HOSPADM

## 2022-07-25 RX ORDER — HYDROCODONE BITARTRATE AND ACETAMINOPHEN 5; 325 MG/1; MG/1
1 TABLET ORAL EVERY 6 HOURS PRN
COMMUNITY
End: 2022-08-05

## 2022-07-25 RX ORDER — ACETAMINOPHEN 160 MG/5ML
650 SOLUTION ORAL EVERY 4 HOURS PRN
Status: DISCONTINUED | OUTPATIENT
Start: 2022-07-25 | End: 2022-07-27 | Stop reason: HOSPADM

## 2022-07-25 RX ORDER — ACETAMINOPHEN 325 MG/1
650 TABLET ORAL EVERY 4 HOURS PRN
Status: DISCONTINUED | OUTPATIENT
Start: 2022-07-25 | End: 2022-07-27 | Stop reason: HOSPADM

## 2022-07-25 RX ORDER — SODIUM CHLORIDE 0.9 % (FLUSH) 0.9 %
10 SYRINGE (ML) INJECTION AS NEEDED
Status: DISCONTINUED | OUTPATIENT
Start: 2022-07-25 | End: 2022-07-27 | Stop reason: HOSPADM

## 2022-07-25 RX ORDER — NICOTINE POLACRILEX 4 MG
15 LOZENGE BUCCAL
Status: DISCONTINUED | OUTPATIENT
Start: 2022-07-25 | End: 2022-07-27 | Stop reason: HOSPADM

## 2022-07-25 RX ORDER — OXYCODONE AND ACETAMINOPHEN 10; 325 MG/1; MG/1
1 TABLET ORAL EVERY 4 HOURS PRN
Status: DISCONTINUED | OUTPATIENT
Start: 2022-07-25 | End: 2022-07-27 | Stop reason: HOSPADM

## 2022-07-25 RX ORDER — ACETAMINOPHEN 650 MG/1
650 SUPPOSITORY RECTAL EVERY 4 HOURS PRN
Status: DISCONTINUED | OUTPATIENT
Start: 2022-07-25 | End: 2022-07-27 | Stop reason: HOSPADM

## 2022-07-25 RX ORDER — INSULIN LISPRO 100 [IU]/ML
0-9 INJECTION, SOLUTION INTRAVENOUS; SUBCUTANEOUS
Status: DISCONTINUED | OUTPATIENT
Start: 2022-07-26 | End: 2022-07-27 | Stop reason: HOSPADM

## 2022-07-25 RX ORDER — CYCLOBENZAPRINE HCL 10 MG
10 TABLET ORAL 3 TIMES DAILY PRN
Status: DISCONTINUED | OUTPATIENT
Start: 2022-07-25 | End: 2022-07-27 | Stop reason: HOSPADM

## 2022-07-25 RX ORDER — OXYCODONE HYDROCHLORIDE AND ACETAMINOPHEN 5; 325 MG/1; MG/1
1 TABLET ORAL EVERY 4 HOURS PRN
Status: DISCONTINUED | OUTPATIENT
Start: 2022-07-25 | End: 2022-07-27 | Stop reason: HOSPADM

## 2022-07-25 RX ORDER — DEXTROSE MONOHYDRATE 25 G/50ML
25 INJECTION, SOLUTION INTRAVENOUS
Status: DISCONTINUED | OUTPATIENT
Start: 2022-07-25 | End: 2022-07-27 | Stop reason: HOSPADM

## 2022-07-25 RX ORDER — ONDANSETRON 2 MG/ML
4 INJECTION INTRAMUSCULAR; INTRAVENOUS ONCE
Status: COMPLETED | OUTPATIENT
Start: 2022-07-25 | End: 2022-07-25

## 2022-07-25 RX ORDER — ROSUVASTATIN CALCIUM 20 MG/1
40 TABLET, COATED ORAL DAILY
Status: DISCONTINUED | OUTPATIENT
Start: 2022-07-26 | End: 2022-07-27 | Stop reason: HOSPADM

## 2022-07-25 RX ORDER — SODIUM CHLORIDE 0.9 % (FLUSH) 0.9 %
10 SYRINGE (ML) INJECTION EVERY 12 HOURS SCHEDULED
Status: DISCONTINUED | OUTPATIENT
Start: 2022-07-25 | End: 2022-07-27 | Stop reason: HOSPADM

## 2022-07-25 RX ADMIN — Medication 10 ML: at 23:53

## 2022-07-25 RX ADMIN — SODIUM CHLORIDE 100 ML/HR: 9 INJECTION, SOLUTION INTRAVENOUS at 21:30

## 2022-07-25 RX ADMIN — HYDROMORPHONE HYDROCHLORIDE 0.5 MG: 1 INJECTION, SOLUTION INTRAMUSCULAR; INTRAVENOUS; SUBCUTANEOUS at 15:27

## 2022-07-25 RX ADMIN — ONDANSETRON 4 MG: 2 INJECTION INTRAMUSCULAR; INTRAVENOUS at 15:27

## 2022-07-25 RX ADMIN — IOPAMIDOL 100 ML: 755 INJECTION, SOLUTION INTRAVENOUS at 17:36

## 2022-07-25 RX ADMIN — HYDROMORPHONE HYDROCHLORIDE 1 MG: 1 INJECTION, SOLUTION INTRAMUSCULAR; INTRAVENOUS; SUBCUTANEOUS at 23:53

## 2022-07-25 RX ADMIN — TAMSULOSIN HYDROCHLORIDE 0.4 MG: 0.4 CAPSULE ORAL at 21:25

## 2022-07-25 RX ADMIN — SODIUM CHLORIDE 500 ML: 9 INJECTION, SOLUTION INTRAVENOUS at 15:22

## 2022-07-26 ENCOUNTER — ANESTHESIA (OUTPATIENT)
Dept: PERIOP | Facility: HOSPITAL | Age: 52
End: 2022-07-26

## 2022-07-26 ENCOUNTER — ANESTHESIA EVENT (OUTPATIENT)
Dept: PERIOP | Facility: HOSPITAL | Age: 52
End: 2022-07-26

## 2022-07-26 ENCOUNTER — APPOINTMENT (OUTPATIENT)
Dept: GENERAL RADIOLOGY | Facility: HOSPITAL | Age: 52
End: 2022-07-26

## 2022-07-26 LAB
ANION GAP SERPL CALCULATED.3IONS-SCNC: 11 MMOL/L (ref 5–15)
BUN SERPL-MCNC: 41 MG/DL (ref 6–20)
BUN/CREAT SERPL: 37.3 (ref 7–25)
CALCIUM SPEC-SCNC: 9.6 MG/DL (ref 8.6–10.5)
CHLORIDE SERPL-SCNC: 107 MMOL/L (ref 98–107)
CO2 SERPL-SCNC: 23 MMOL/L (ref 22–29)
CREAT SERPL-MCNC: 1.1 MG/DL (ref 0.76–1.27)
DEPRECATED RDW RBC AUTO: 45.1 FL (ref 37–54)
EGFRCR SERPLBLD CKD-EPI 2021: 81.8 ML/MIN/1.73
ERYTHROCYTE [DISTWIDTH] IN BLOOD BY AUTOMATED COUNT: 13.5 % (ref 12.3–15.4)
GLUCOSE BLDC GLUCOMTR-MCNC: 107 MG/DL (ref 70–130)
GLUCOSE BLDC GLUCOMTR-MCNC: 114 MG/DL (ref 70–130)
GLUCOSE BLDC GLUCOMTR-MCNC: 121 MG/DL (ref 70–130)
GLUCOSE SERPL-MCNC: 116 MG/DL (ref 65–99)
HCT VFR BLD AUTO: 34.1 % (ref 37.5–51)
HGB BLD-MCNC: 11.2 G/DL (ref 13–17.7)
MCH RBC QN AUTO: 29.8 PG (ref 26.6–33)
MCHC RBC AUTO-ENTMCNC: 32.8 G/DL (ref 31.5–35.7)
MCV RBC AUTO: 90.7 FL (ref 79–97)
PLATELET # BLD AUTO: 195 10*3/MM3 (ref 140–450)
PMV BLD AUTO: 10.5 FL (ref 6–12)
POTASSIUM SERPL-SCNC: 4.2 MMOL/L (ref 3.5–5.2)
RBC # BLD AUTO: 3.76 10*6/MM3 (ref 4.14–5.8)
SODIUM SERPL-SCNC: 141 MMOL/L (ref 136–145)
TROPONIN T SERPL-MCNC: <0.01 NG/ML (ref 0–0.03)
WBC NRBC COR # BLD: 7.51 10*3/MM3 (ref 3.4–10.8)

## 2022-07-26 PROCEDURE — 25010000002 FENTANYL CITRATE (PF) 250 MCG/5ML SOLUTION: Performed by: NURSE ANESTHETIST, CERTIFIED REGISTERED

## 2022-07-26 PROCEDURE — 52332 CYSTOSCOPY AND TREATMENT: CPT | Performed by: UROLOGY

## 2022-07-26 PROCEDURE — 25010000002 PROPOFOL 10 MG/ML EMULSION: Performed by: NURSE ANESTHETIST, CERTIFIED REGISTERED

## 2022-07-26 PROCEDURE — 96361 HYDRATE IV INFUSION ADD-ON: CPT

## 2022-07-26 PROCEDURE — G0378 HOSPITAL OBSERVATION PER HR: HCPCS

## 2022-07-26 PROCEDURE — 82962 GLUCOSE BLOOD TEST: CPT

## 2022-07-26 PROCEDURE — 25010000002 CEFAZOLIN PER 500 MG: Performed by: UROLOGY

## 2022-07-26 PROCEDURE — 96376 TX/PRO/DX INJ SAME DRUG ADON: CPT

## 2022-07-26 PROCEDURE — C1769 GUIDE WIRE: HCPCS | Performed by: UROLOGY

## 2022-07-26 PROCEDURE — 25010000002 IOPAMIDOL 61 % SOLUTION: Performed by: UROLOGY

## 2022-07-26 PROCEDURE — 52351 CYSTOURETERO & OR PYELOSCOPE: CPT | Performed by: UROLOGY

## 2022-07-26 PROCEDURE — 74420 UROGRAPHY RTRGR +-KUB: CPT

## 2022-07-26 PROCEDURE — 99214 OFFICE O/P EST MOD 30 MIN: CPT | Performed by: UROLOGY

## 2022-07-26 PROCEDURE — C2617 STENT, NON-COR, TEM W/O DEL: HCPCS | Performed by: UROLOGY

## 2022-07-26 PROCEDURE — 25010000002 ONDANSETRON PER 1 MG: Performed by: NURSE ANESTHETIST, CERTIFIED REGISTERED

## 2022-07-26 PROCEDURE — 25010000002 DROPERIDOL PER 5 MG: Performed by: NURSE ANESTHETIST, CERTIFIED REGISTERED

## 2022-07-26 PROCEDURE — C1758 CATHETER, URETERAL: HCPCS | Performed by: UROLOGY

## 2022-07-26 PROCEDURE — 0 HYDROMORPHONE 1 MG/ML SOLUTION: Performed by: INTERNAL MEDICINE

## 2022-07-26 DEVICE — URETERAL STENT
Type: IMPLANTABLE DEVICE | Site: URETER | Status: FUNCTIONAL
Brand: PERCUFLEX™ PLUS

## 2022-07-26 RX ORDER — LIDOCAINE HYDROCHLORIDE 10 MG/ML
0.5 INJECTION, SOLUTION EPIDURAL; INFILTRATION; INTRACAUDAL; PERINEURAL ONCE AS NEEDED
Status: DISCONTINUED | OUTPATIENT
Start: 2022-07-26 | End: 2022-07-26 | Stop reason: HOSPADM

## 2022-07-26 RX ORDER — LABETALOL HYDROCHLORIDE 5 MG/ML
5 INJECTION, SOLUTION INTRAVENOUS
Status: DISCONTINUED | OUTPATIENT
Start: 2022-07-26 | End: 2022-07-26 | Stop reason: HOSPADM

## 2022-07-26 RX ORDER — CHLORHEXIDINE/GLYCERIN/HE-CELL
JELLY (GRAM) TOPICAL AS NEEDED
Status: DISCONTINUED | OUTPATIENT
Start: 2022-07-26 | End: 2022-07-26 | Stop reason: HOSPADM

## 2022-07-26 RX ORDER — FLUMAZENIL 0.1 MG/ML
0.2 INJECTION INTRAVENOUS AS NEEDED
Status: DISCONTINUED | OUTPATIENT
Start: 2022-07-26 | End: 2022-07-26 | Stop reason: HOSPADM

## 2022-07-26 RX ORDER — LIDOCAINE HYDROCHLORIDE 20 MG/ML
INJECTION, SOLUTION EPIDURAL; INFILTRATION; INTRACAUDAL; PERINEURAL AS NEEDED
Status: DISCONTINUED | OUTPATIENT
Start: 2022-07-26 | End: 2022-07-26 | Stop reason: SURG

## 2022-07-26 RX ORDER — IBUPROFEN 600 MG/1
600 TABLET ORAL ONCE AS NEEDED
Status: DISCONTINUED | OUTPATIENT
Start: 2022-07-26 | End: 2022-07-26 | Stop reason: HOSPADM

## 2022-07-26 RX ORDER — DROPERIDOL 2.5 MG/ML
0.62 INJECTION, SOLUTION INTRAMUSCULAR; INTRAVENOUS ONCE AS NEEDED
Status: DISCONTINUED | OUTPATIENT
Start: 2022-07-26 | End: 2022-07-26 | Stop reason: HOSPADM

## 2022-07-26 RX ORDER — OXYCODONE AND ACETAMINOPHEN 10; 325 MG/1; MG/1
1 TABLET ORAL ONCE AS NEEDED
Status: DISCONTINUED | OUTPATIENT
Start: 2022-07-26 | End: 2022-07-26 | Stop reason: HOSPADM

## 2022-07-26 RX ORDER — MAGNESIUM HYDROXIDE 1200 MG/15ML
LIQUID ORAL AS NEEDED
Status: DISCONTINUED | OUTPATIENT
Start: 2022-07-26 | End: 2022-07-26 | Stop reason: HOSPADM

## 2022-07-26 RX ORDER — VECURONIUM BROMIDE 1 MG/ML
INJECTION, POWDER, LYOPHILIZED, FOR SOLUTION INTRAVENOUS AS NEEDED
Status: DISCONTINUED | OUTPATIENT
Start: 2022-07-26 | End: 2022-07-26 | Stop reason: SURG

## 2022-07-26 RX ORDER — CEFAZOLIN SODIUM IN 0.9 % NACL 3 G/100 ML
3 INTRAVENOUS SOLUTION, PIGGYBACK (ML) INTRAVENOUS ONCE
Status: COMPLETED | OUTPATIENT
Start: 2022-07-26 | End: 2022-07-26

## 2022-07-26 RX ORDER — ONDANSETRON 2 MG/ML
4 INJECTION INTRAMUSCULAR; INTRAVENOUS
Status: DISCONTINUED | OUTPATIENT
Start: 2022-07-26 | End: 2022-07-26 | Stop reason: HOSPADM

## 2022-07-26 RX ORDER — ACETAMINOPHEN 500 MG
1000 TABLET ORAL ONCE
Status: COMPLETED | OUTPATIENT
Start: 2022-07-26 | End: 2022-07-26

## 2022-07-26 RX ORDER — MIDAZOLAM HYDROCHLORIDE 1 MG/ML
1 INJECTION INTRAMUSCULAR; INTRAVENOUS
Status: DISCONTINUED | OUTPATIENT
Start: 2022-07-26 | End: 2022-07-26 | Stop reason: HOSPADM

## 2022-07-26 RX ORDER — SODIUM CHLORIDE 0.9 % (FLUSH) 0.9 %
3 SYRINGE (ML) INJECTION EVERY 12 HOURS SCHEDULED
Status: DISCONTINUED | OUTPATIENT
Start: 2022-07-26 | End: 2022-07-26 | Stop reason: HOSPADM

## 2022-07-26 RX ORDER — NEOSTIGMINE METHYLSULFATE 5 MG/5 ML
SYRINGE (ML) INTRAVENOUS AS NEEDED
Status: DISCONTINUED | OUTPATIENT
Start: 2022-07-26 | End: 2022-07-26 | Stop reason: SURG

## 2022-07-26 RX ORDER — DROPERIDOL 2.5 MG/ML
INJECTION, SOLUTION INTRAMUSCULAR; INTRAVENOUS AS NEEDED
Status: DISCONTINUED | OUTPATIENT
Start: 2022-07-26 | End: 2022-07-26 | Stop reason: SURG

## 2022-07-26 RX ORDER — FENTANYL CITRATE 50 UG/ML
25 INJECTION, SOLUTION INTRAMUSCULAR; INTRAVENOUS
Status: DISCONTINUED | OUTPATIENT
Start: 2022-07-26 | End: 2022-07-26 | Stop reason: HOSPADM

## 2022-07-26 RX ORDER — FENTANYL CITRATE 50 UG/ML
INJECTION, SOLUTION INTRAMUSCULAR; INTRAVENOUS AS NEEDED
Status: DISCONTINUED | OUTPATIENT
Start: 2022-07-26 | End: 2022-07-26 | Stop reason: SURG

## 2022-07-26 RX ORDER — SODIUM CHLORIDE 0.9 % (FLUSH) 0.9 %
3-10 SYRINGE (ML) INJECTION AS NEEDED
Status: DISCONTINUED | OUTPATIENT
Start: 2022-07-26 | End: 2022-07-26 | Stop reason: HOSPADM

## 2022-07-26 RX ORDER — NALOXONE HCL 0.4 MG/ML
0.04 VIAL (ML) INJECTION AS NEEDED
Status: DISCONTINUED | OUTPATIENT
Start: 2022-07-26 | End: 2022-07-26 | Stop reason: HOSPADM

## 2022-07-26 RX ORDER — ONDANSETRON 2 MG/ML
INJECTION INTRAMUSCULAR; INTRAVENOUS AS NEEDED
Status: DISCONTINUED | OUTPATIENT
Start: 2022-07-26 | End: 2022-07-26 | Stop reason: SURG

## 2022-07-26 RX ORDER — SODIUM CHLORIDE, SODIUM LACTATE, POTASSIUM CHLORIDE, CALCIUM CHLORIDE 600; 310; 30; 20 MG/100ML; MG/100ML; MG/100ML; MG/100ML
100 INJECTION, SOLUTION INTRAVENOUS CONTINUOUS
Status: DISCONTINUED | OUTPATIENT
Start: 2022-07-26 | End: 2022-07-26

## 2022-07-26 RX ORDER — PROPOFOL 10 MG/ML
VIAL (ML) INTRAVENOUS AS NEEDED
Status: DISCONTINUED | OUTPATIENT
Start: 2022-07-26 | End: 2022-07-26 | Stop reason: SURG

## 2022-07-26 RX ORDER — HYDROMORPHONE HYDROCHLORIDE 1 MG/ML
0.5 INJECTION, SOLUTION INTRAMUSCULAR; INTRAVENOUS; SUBCUTANEOUS
Status: DISCONTINUED | OUTPATIENT
Start: 2022-07-26 | End: 2022-07-26 | Stop reason: HOSPADM

## 2022-07-26 RX ADMIN — Medication 3.5 MG: at 15:54

## 2022-07-26 RX ADMIN — GLYCOPYRROLATE 0.4 MG: 0.2 INJECTION INTRAMUSCULAR; INTRAVENOUS at 15:54

## 2022-07-26 RX ADMIN — VECURONIUM BROMIDE 5 MG: 1 INJECTION, POWDER, LYOPHILIZED, FOR SOLUTION INTRAVENOUS at 15:31

## 2022-07-26 RX ADMIN — LIDOCAINE HYDROCHLORIDE 200 MG: 20 INJECTION, SOLUTION EPIDURAL; INFILTRATION; INTRACAUDAL at 15:31

## 2022-07-26 RX ADMIN — SODIUM CHLORIDE 100 ML/HR: 9 INJECTION, SOLUTION INTRAVENOUS at 07:44

## 2022-07-26 RX ADMIN — Medication 3 G: at 15:28

## 2022-07-26 RX ADMIN — ONDANSETRON 8 MG: 2 INJECTION INTRAMUSCULAR; INTRAVENOUS at 15:41

## 2022-07-26 RX ADMIN — OXYCODONE HYDROCHLORIDE AND ACETAMINOPHEN 1 TABLET: 5; 325 TABLET ORAL at 23:38

## 2022-07-26 RX ADMIN — AMLODIPINE BESYLATE 2.5 MG: 5 TABLET ORAL at 08:39

## 2022-07-26 RX ADMIN — ACETAMINOPHEN 1000 MG: 500 TABLET ORAL at 15:25

## 2022-07-26 RX ADMIN — TAMSULOSIN HYDROCHLORIDE 0.4 MG: 0.4 CAPSULE ORAL at 20:08

## 2022-07-26 RX ADMIN — OXYCODONE AND ACETAMINOPHEN 1 TABLET: 325; 10 TABLET ORAL at 08:45

## 2022-07-26 RX ADMIN — OXYCODONE HYDROCHLORIDE AND ACETAMINOPHEN 1 TABLET: 5; 325 TABLET ORAL at 17:33

## 2022-07-26 RX ADMIN — SODIUM CHLORIDE, POTASSIUM CHLORIDE, SODIUM LACTATE AND CALCIUM CHLORIDE 100 ML/HR: 600; 310; 30; 20 INJECTION, SOLUTION INTRAVENOUS at 15:24

## 2022-07-26 RX ADMIN — PROPOFOL 200 MG: 10 INJECTION, EMULSION INTRAVENOUS at 15:31

## 2022-07-26 RX ADMIN — Medication 10 ML: at 20:08

## 2022-07-26 RX ADMIN — DROPERIDOL 1.25 MG: 2.5 INJECTION, SOLUTION INTRAMUSCULAR; INTRAVENOUS at 15:31

## 2022-07-26 RX ADMIN — HYDROMORPHONE HYDROCHLORIDE 1 MG: 1 INJECTION, SOLUTION INTRAMUSCULAR; INTRAVENOUS; SUBCUTANEOUS at 12:09

## 2022-07-26 RX ADMIN — FENTANYL CITRATE 250 MCG: 50 INJECTION, SOLUTION INTRAMUSCULAR; INTRAVENOUS at 15:31

## 2022-07-26 RX ADMIN — OXYCODONE HYDROCHLORIDE AND ACETAMINOPHEN 1 TABLET: 5; 325 TABLET ORAL at 02:33

## 2022-07-26 NOTE — ANESTHESIA POSTPROCEDURE EVALUATION
"Patient: Randell Reddy    Procedure Summary     Date: 07/26/22 Room / Location:  [facility] OR 01 /  [facility] OR    Anesthesia Start: 1528 Anesthesia Stop: 1608    Procedure: CYSTOSCOPY RETROGRADE PYELOGRAM AND STENT INSERTION (Left Bladder) Diagnosis:       Left ureteral stone      (Left ureteral stone [N20.1])    Surgeons: Dat Vega MD Provider: Avelino Serna CRNA    Anesthesia Type: general ASA Status: 3          Anesthesia Type: general    Vitals  No vitals data found for the desired time range.          Post Anesthesia Care and Evaluation    Patient location during evaluation: PACU  Patient participation: complete - patient participated  Level of consciousness: awake and alert  Pain management: adequate    Airway patency: patent  Anesthetic complications: No anesthetic complications    Cardiovascular status: acceptable  Respiratory status: acceptable  Hydration status: acceptable    Comments: Blood pressure 133/75, pulse 72, temperature 98.4 °F (36.9 °C), temperature source Oral, resp. rate 16, height 188 cm (74\"), weight (!) 163 kg (359 lb 3.2 oz), SpO2 95 %.    Pt discharged from PACU based on melchor score >8      "

## 2022-07-26 NOTE — ANESTHESIA PROCEDURE NOTES
Airway  Urgency: elective    Date/Time: 7/26/2022 3:31 PM  Airway not difficult    General Information and Staff    Patient location during procedure: OR  CRNA/CAA: Avelino Senra CRNA    Indications and Patient Condition  Indications for airway management: airway protection    Preoxygenated: yes  MILS maintained throughout  Mask difficulty assessment: 1 - vent by mask    Final Airway Details  Final airway type: endotracheal airway      Successful airway: ETT  Cuffed: yes   Successful intubation technique: direct laryngoscopy  Endotracheal tube insertion site: oral  Blade: Weaver  Blade size: 2  ETT size (mm): 7.5  Cormack-Lehane Classification: grade I - full view of glottis  Placement verified by: chest auscultation and capnometry   Cuff volume (mL): 5  Measured from: lips  ETT/EBT  to lips (cm): 20  Number of attempts at approach: 1  Assessment: lips, teeth, and gum same as pre-op and atraumatic intubation

## 2022-07-26 NOTE — ANESTHESIA PREPROCEDURE EVALUATION
Anesthesia Evaluation     Patient summary reviewed and Nursing notes reviewed   NPO Solid Status: > 8 hours  NPO Liquid Status: < 2 hours           Airway   Mallampati: III  TM distance: >3 FB  Neck ROM: full  No difficulty expected  Dental - normal exam     Pulmonary - normal exam   (+) sleep apnea on CPAP,   (-) not a smoker  Cardiovascular - normal exam  Exercise tolerance: good (4-7 METS)    (+) hypertension well controlled, hyperlipidemia,       Neuro/Psych- negative ROS  GI/Hepatic/Renal/Endo    (+) morbid obesity,  renal disease stones, diabetes mellitus type 2,     Musculoskeletal     Abdominal    Substance History      OB/GYN negative ob/gyn ROS         Other   arthritis,                      Anesthesia Plan    ASA 3     general     intravenous induction           CODE STATUS:    Level Of Support Discussed With: Patient  Code Status (Patient has no pulse and is not breathing): CPR (Attempt to Resuscitate)  Medical Interventions (Patient has pulse or is breathing): Full Support

## 2022-07-27 VITALS
SYSTOLIC BLOOD PRESSURE: 160 MMHG | HEIGHT: 74 IN | TEMPERATURE: 97.9 F | WEIGHT: 315 LBS | OXYGEN SATURATION: 95 % | BODY MASS INDEX: 40.43 KG/M2 | DIASTOLIC BLOOD PRESSURE: 91 MMHG | HEART RATE: 77 BPM | RESPIRATION RATE: 16 BRPM

## 2022-07-27 LAB
ANION GAP SERPL CALCULATED.3IONS-SCNC: 7 MMOL/L (ref 5–15)
BUN SERPL-MCNC: 28 MG/DL (ref 6–20)
BUN/CREAT SERPL: 26.2 (ref 7–25)
CALCIUM SPEC-SCNC: 8.8 MG/DL (ref 8.6–10.5)
CHLORIDE SERPL-SCNC: 107 MMOL/L (ref 98–107)
CO2 SERPL-SCNC: 27 MMOL/L (ref 22–29)
CREAT SERPL-MCNC: 1.07 MG/DL (ref 0.76–1.27)
DEPRECATED RDW RBC AUTO: 46.7 FL (ref 37–54)
EGFRCR SERPLBLD CKD-EPI 2021: 84.5 ML/MIN/1.73
ERYTHROCYTE [DISTWIDTH] IN BLOOD BY AUTOMATED COUNT: 13.7 % (ref 12.3–15.4)
GLUCOSE BLDC GLUCOMTR-MCNC: 113 MG/DL (ref 70–130)
GLUCOSE SERPL-MCNC: 129 MG/DL (ref 65–99)
HCT VFR BLD AUTO: 30.8 % (ref 37.5–51)
HGB BLD-MCNC: 9.7 G/DL (ref 13–17.7)
MCH RBC QN AUTO: 29.3 PG (ref 26.6–33)
MCHC RBC AUTO-ENTMCNC: 31.5 G/DL (ref 31.5–35.7)
MCV RBC AUTO: 93.1 FL (ref 79–97)
PLATELET # BLD AUTO: 171 10*3/MM3 (ref 140–450)
PMV BLD AUTO: 10.6 FL (ref 6–12)
POTASSIUM SERPL-SCNC: 4.1 MMOL/L (ref 3.5–5.2)
QT INTERVAL: 352 MS
QT INTERVAL: 386 MS
QTC INTERVAL: 421 MS
QTC INTERVAL: 456 MS
RBC # BLD AUTO: 3.31 10*6/MM3 (ref 4.14–5.8)
SODIUM SERPL-SCNC: 141 MMOL/L (ref 136–145)
WBC NRBC COR # BLD: 7.12 10*3/MM3 (ref 3.4–10.8)

## 2022-07-27 PROCEDURE — 85027 COMPLETE CBC AUTOMATED: CPT | Performed by: UROLOGY

## 2022-07-27 PROCEDURE — G0378 HOSPITAL OBSERVATION PER HR: HCPCS

## 2022-07-27 PROCEDURE — 80048 BASIC METABOLIC PNL TOTAL CA: CPT | Performed by: UROLOGY

## 2022-07-27 PROCEDURE — 82962 GLUCOSE BLOOD TEST: CPT

## 2022-07-27 RX ORDER — ACETAMINOPHEN 325 MG/1
650 TABLET ORAL EVERY 4 HOURS PRN
Start: 2022-07-27

## 2022-07-27 RX ADMIN — OXYCODONE AND ACETAMINOPHEN 1 TABLET: 325; 10 TABLET ORAL at 06:27

## 2022-07-29 ENCOUNTER — HOSPITAL ENCOUNTER (OUTPATIENT)
Dept: CT IMAGING | Facility: HOSPITAL | Age: 52
Discharge: HOME | End: 2022-07-29
Admitting: UROLOGY

## 2022-07-29 DIAGNOSIS — N13.2 URETERAL STONE WITH HYDRONEPHROSIS: ICD-10-CM

## 2022-07-29 PROCEDURE — 74176 CT ABD & PELVIS W/O CONTRAST: CPT

## 2022-08-01 ENCOUNTER — OFFICE VISIT (OUTPATIENT)
Dept: UROLOGY | Facility: CLINIC | Age: 52
End: 2022-08-01

## 2022-08-01 ENCOUNTER — HOSPITAL ENCOUNTER (OUTPATIENT)
Dept: GENERAL RADIOLOGY | Facility: HOSPITAL | Age: 52
Discharge: HOME | End: 2022-08-01
Admitting: PHYSICIAN ASSISTANT

## 2022-08-01 VITALS — TEMPERATURE: 97.5 F | WEIGHT: 315 LBS | HEIGHT: 74 IN | BODY MASS INDEX: 40.43 KG/M2

## 2022-08-01 DIAGNOSIS — N20.1 LEFT URETERAL STONE: ICD-10-CM

## 2022-08-01 DIAGNOSIS — N20.1 LEFT URETERAL STONE: Primary | ICD-10-CM

## 2022-08-01 LAB
BILIRUB BLD-MCNC: NEGATIVE MG/DL
CLARITY, POC: ABNORMAL
COLOR UR: ABNORMAL
GLUCOSE UR STRIP-MCNC: NEGATIVE MG/DL
KETONES UR QL: NEGATIVE
LEUKOCYTE EST, POC: ABNORMAL
NITRITE UR-MCNC: NEGATIVE MG/ML
PH UR: 5.5 [PH] (ref 5–8)
PROT UR STRIP-MCNC: ABNORMAL MG/DL
RBC # UR STRIP: ABNORMAL /UL
SP GR UR: 1.03 (ref 1–1.03)
UROBILINOGEN UR QL: NORMAL

## 2022-08-01 PROCEDURE — 99024 POSTOP FOLLOW-UP VISIT: CPT | Performed by: PHYSICIAN ASSISTANT

## 2022-08-01 PROCEDURE — 81001 URINALYSIS AUTO W/SCOPE: CPT | Performed by: PHYSICIAN ASSISTANT

## 2022-08-01 PROCEDURE — 74018 RADEX ABDOMEN 1 VIEW: CPT

## 2022-08-01 RX ORDER — SODIUM CHLORIDE, SODIUM LACTATE, POTASSIUM CHLORIDE, CALCIUM CHLORIDE 600; 310; 30; 20 MG/100ML; MG/100ML; MG/100ML; MG/100ML
100 INJECTION, SOLUTION INTRAVENOUS CONTINUOUS
Status: CANCELLED | OUTPATIENT
Start: 2022-08-01

## 2022-08-05 ENCOUNTER — PRE-ADMISSION TESTING (OUTPATIENT)
Dept: PREADMISSION TESTING | Facility: HOSPITAL | Age: 52
End: 2022-08-05

## 2022-08-05 ENCOUNTER — LAB (OUTPATIENT)
Dept: LAB | Facility: HOSPITAL | Age: 52
End: 2022-08-05

## 2022-08-05 ENCOUNTER — TELEPHONE (OUTPATIENT)
Dept: UROLOGY | Facility: CLINIC | Age: 52
End: 2022-08-05

## 2022-08-05 VITALS
DIASTOLIC BLOOD PRESSURE: 92 MMHG | WEIGHT: 315 LBS | HEART RATE: 74 BPM | SYSTOLIC BLOOD PRESSURE: 162 MMHG | RESPIRATION RATE: 18 BRPM | BODY MASS INDEX: 40.43 KG/M2 | OXYGEN SATURATION: 97 % | HEIGHT: 74 IN

## 2022-08-05 DIAGNOSIS — N20.1 LEFT URETERAL STONE: ICD-10-CM

## 2022-08-05 LAB
ANION GAP SERPL CALCULATED.3IONS-SCNC: 7 MMOL/L (ref 5–15)
BACTERIA UR QL AUTO: ABNORMAL /HPF
BILIRUB UR QL STRIP: NEGATIVE
BUN SERPL-MCNC: 18 MG/DL (ref 6–20)
BUN/CREAT SERPL: 18.4 (ref 7–25)
CALCIUM SPEC-SCNC: 9.2 MG/DL (ref 8.6–10.5)
CHLORIDE SERPL-SCNC: 107 MMOL/L (ref 98–107)
CLARITY UR: ABNORMAL
CO2 SERPL-SCNC: 26 MMOL/L (ref 22–29)
COLOR UR: ABNORMAL
CREAT SERPL-MCNC: 0.98 MG/DL (ref 0.76–1.27)
DEPRECATED RDW RBC AUTO: 48.4 FL (ref 37–54)
EGFRCR SERPLBLD CKD-EPI 2021: 93.9 ML/MIN/1.73
ERYTHROCYTE [DISTWIDTH] IN BLOOD BY AUTOMATED COUNT: 14.7 % (ref 12.3–15.4)
GLUCOSE SERPL-MCNC: 122 MG/DL (ref 65–99)
GLUCOSE UR STRIP-MCNC: NEGATIVE MG/DL
HCT VFR BLD AUTO: 32.8 % (ref 37.5–51)
HGB BLD-MCNC: 10.5 G/DL (ref 13–17.7)
HGB UR QL STRIP.AUTO: ABNORMAL
KETONES UR QL STRIP: ABNORMAL
LEUKOCYTE ESTERASE UR QL STRIP.AUTO: ABNORMAL
MCH RBC QN AUTO: 29.8 PG (ref 26.6–33)
MCHC RBC AUTO-ENTMCNC: 32 G/DL (ref 31.5–35.7)
MCV RBC AUTO: 93.2 FL (ref 79–97)
NITRITE UR QL STRIP: NEGATIVE
PH UR STRIP.AUTO: 5.5 [PH] (ref 5–8)
PLATELET # BLD AUTO: 245 10*3/MM3 (ref 140–450)
PMV BLD AUTO: 10.2 FL (ref 6–12)
POTASSIUM SERPL-SCNC: 4.5 MMOL/L (ref 3.5–5.2)
PROT UR QL STRIP: ABNORMAL
RBC # BLD AUTO: 3.52 10*6/MM3 (ref 4.14–5.8)
RBC # UR STRIP: ABNORMAL /HPF
REF LAB TEST METHOD: ABNORMAL
SARS-COV-2 ORF1AB RESP QL NAA+PROBE: NOT DETECTED
SODIUM SERPL-SCNC: 140 MMOL/L (ref 136–145)
SP GR UR STRIP: 1.02 (ref 1–1.03)
SQUAMOUS #/AREA URNS HPF: ABNORMAL /HPF
UROBILINOGEN UR QL STRIP: ABNORMAL
WBC # UR STRIP: ABNORMAL /HPF
WBC NRBC COR # BLD: 5.57 10*3/MM3 (ref 3.4–10.8)

## 2022-08-05 PROCEDURE — 36415 COLL VENOUS BLD VENIPUNCTURE: CPT

## 2022-08-05 PROCEDURE — U0004 COV-19 TEST NON-CDC HGH THRU: HCPCS

## 2022-08-05 PROCEDURE — 87086 URINE CULTURE/COLONY COUNT: CPT

## 2022-08-05 PROCEDURE — 80048 BASIC METABOLIC PNL TOTAL CA: CPT

## 2022-08-05 PROCEDURE — 81001 URINALYSIS AUTO W/SCOPE: CPT

## 2022-08-05 PROCEDURE — 85027 COMPLETE CBC AUTOMATED: CPT

## 2022-08-05 PROCEDURE — C9803 HOPD COVID-19 SPEC COLLECT: HCPCS

## 2022-08-05 NOTE — DISCHARGE INSTRUCTIONS
Before you come to the hospital        Arrival time: AS DIRECTED BY OFFICE     YOU MAY TAKE THE FOLLOWING MEDICATION(S) THE MORNING OF SURGERY WITH A SIP OF WATER: ***           ALL OTHER HOME MEDICATION CHECK WITH YOUR PHYSICIAN (especially if you are taking diabetes medicines or blood thinners)    Do not take any Erectile Dysfunction medications (EX: CIALIS, VIAGRA) 24 hours prior to surgery.      If you were given and instructed to use a germ- killing soap, use as directed the night before surgery and the morning of surgery before coming to the hospital.             Eating and drinking restrictions prior to scheduled arrival time    2 Hours before arrival time STOP   Drinking Clear liquids (water, apple juice-no pulp)     6 Hours before arrival time STOP   Milk or drinks that contain milk, full liquids    6 Hours before arrival time STOP   Light meals or foods, such as toast or cereal    8 Hours before arrival time STOP   Heavy foods, such as meat, fried foods, or fatty foods    (It is extremely important that you follow these guidelines to prevent delay or cancelation of your procedure)     Clear Liquids  Water and flavored water                                                                      Clear Fruit juices, such as cranberry juice and apple juice.  Black coffee (NO cream of any kind, including powdered).  Plain tea  Clear bouillon or broth.  Flavored gelatin.  Soda.  Gatorade or Powerade.  Full liquid examples  Juices that have pulp.  Frozen ice pops that contain fruit pieces.  Coffee with creamer  Milk.  Yogurt.                MANAGING PAIN AFTER SURGERY    We know you are probably wondering what your pain will be like after surgery.  Following surgery it is unrealistic to expect you will not have pain.   Pain is how our bodies let us know that something is wrong or cautions us to be careful.  That said, our goal is to make your pain tolerable.    Methods we may use to treat your pain include (oral or  IV medications, PCAs, epidurals, nerve blocks, etc.)   While some procedures require IV pain medications for a short time after surgery, transitioning to pain medications by mouth allows for better management of pain.   Your nurse will encourage you to take oral pain medications whenever possible.  IV medications work almost immediately, but only last a short while.  Taking medications by mouth allows for a more constant level of medication in your blood stream for a longer period of time.      Once your pain is out of control it is harder to get back under control.  It is important you are aware when your next dose of pain medication is due.  If you are admitted, your nurse may write the time of your next dose on the white board in your room to help you remember.      We are interested in your pain and encourage you to inform us about aggravating factors during your visit.   Many times a simple repositioning every few hours can make a big difference.    If your physician says it is okay, do not let your pain prevent you from getting out of bed. Be sure to call your nurse for assistance prior to getting up so you do not fall.      Before surgery, please decide your tolerable pain goal.  These faces help describe the pain ratings we use on a 0-10 scale.   Be prepared to tell us your goal and whether or not you take pain or anxiety medications at home.          Preparing for Surgery  Preparing for surgery is an important part of your care. It can make things go more smoothly and help you avoid complications. The steps leading up to surgery may vary among hospitals. Follow all instructions given to you by your health care providers. Ask questions if you do not understand something. Talk about any concerns that you have.  Here are some questions to consider asking before your surgery:  If my surgery is not an emergency (is elective), when would be the best time to have the surgery?  What arrangements do I need to make  for work, home, or school?  What will my recovery be like? How long will it be before I can return to normal activities?  Will I need to prepare my home? Will I need to arrange care for me or my children?  Should I expect to have pain after surgery? What are my pain management options? Are there nonmedical options that I can try for pain?  Tell a health care provider about:  Any allergies you have.  All medicines you are taking, including vitamins, herbs, eye drops, creams, and over-the-counter medicines.  Any problems you or family members have had with anesthetic medicines.  Any blood disorders you have.  Any surgeries you have had.  Any medical conditions you have.  Whether you are pregnant or may be pregnant.  What are the risks?  The risks and complications of surgery depend on the specific procedure that you have. Discuss all the risks with your health care providers before your surgery. Ask about common surgical complications, which may include:  Infection.  Bleeding or a need for blood replacement (transfusion).  Allergic reactions to medicines.  Damage to surrounding nerves, tissues, or structures.  A blood clot.  Scarring.  Failure of the surgery to correct the problem.  Follow these instructions before the procedure:  Several days or weeks before your procedure  You may have a physical exam by your primary health care provider to make sure it is safe for you to have surgery.  You may have testing. This may include a chest X-ray, blood and urine tests, electrocardiogram (ECG), or other testing.  Ask your health care provider about:  Changing or stopping your regular medicines. This is especially important if you are taking diabetes medicines or blood thinners.  Taking medicines such as aspirin and ibuprofen. These medicines can thin your blood. Do not take these medicines unless your health care provider tells you to take them.  Taking over-the-counter medicines, vitamins, herbs, and supplements.  Do not  use any products that contain nicotine or tobacco, such as cigarettes and e-cigarettes. If you need help quitting, ask your health care provider.  Avoid alcohol.  Ask your health care provider if there are exercises you can do to prepare for surgery.  Eat a healthy diet.   Plan to have someone take you home from the hospital or clinic.  Plan to have a responsible adult care for you for at least 24 hours after you leave the hospital or clinic. This is important.  The day before your procedure  You may be given antibiotic medicine to take by mouth to help prevent infection. Take it as told by your health care provider.  You may be asked to shower with a germ-killing soap.  Follow instructions from your health care provider about eating and drinking restrictions. This includes gum, mints and hard candy.  Pack comfortable clothes according to your procedure.   The day of your procedure  You may need to take another shower with a germ-killing soap before you leave home in the morning.  With a small sip of water, take only the medicines that you are told to take.  Remove all jewelry including rings.   Leave anything you consider valuable at home except hearing aids if needed.  Do not wear any makeup, nail polish, powder, deodorant, lotion, hair accessories, or anything on your skin or body except your clothes.  If you will be staying in the hospital, bring a case to hold your glasses, contacts, or dentures. You may also want to bring your robe and non-skid footwear.  If you wear oxygen at home, bring it with you the day of surgery.  If instructed by your health care provider, bring your sleep apnea device with you on the day of your surgery (if this applies to you).  You may want to leave your suitcase and sleep apnea device in the car until after surgery.   Arrive at the hospital as scheduled.  Bring a friend or family member with you who can help to answer questions and be present while you meet with your health care  provider.  At the hospital  When you arrive at the hospital:  Go to registration located at the main entrance of the hospital. You will be registered and given a beeper and a sheet of name stickers. Take the stickers to the Outpatient nurses desk and place in the black tray. This is to notify staff that you have arrived. Then return to the lobby to wait.   When your beeper lights up and vibrates proceed through the double doors, under the stairs, and a member of the Outpatient Surgery staff will escort you to your preoperative room.  You may have to wear compression sleeves. These help to prevent blood clots and reduce swelling in your legs.  An IV may be inserted into one of your veins.              In the operating room, you may be given one or more of the following:        A medicine to help you relax (sedative).        A medicine to numb the area (local anesthetic).        A medicine to make you fall asleep (general anesthetic).        A medicine that is injected into an area of your body to numb everything below the                      injection site (regional anesthetic).  You may be given an antibiotic through your IV to help prevent infection.  Your surgical site will be marked or identified.    Contact a health care provider if you:  Develop a fever of more than 100.4°F (38°C) or other feelings of illness during the 48 hours before your surgery.  Have symptoms that get worse.  Have questions or concerns about your surgery.  Summary  Preparing for surgery can make the procedure go more smoothly and lower your risk of complications.  Before surgery, make a list of questions and concerns to discuss with your surgeon. Ask about the risks and possible complications.  In the days or weeks before your surgery, follow all instructions from your health care provider. You may need to stop smoking, avoid alcohol, follow eating restrictions, and change or stop your regular medicines.  Contact your surgeon if you  develop a fever or other signs of illness during the few days before your surgery.  This information is not intended to replace advice given to you by your health care provider. Make sure you discuss any questions you have with your health care provider.  Document Revised: 12/21/2018 Document Reviewed: 10/23/2018  Elsevier Patient Education © 2021 [employer]

## 2022-08-05 NOTE — TELEPHONE ENCOUNTER
Called patient and reminded them to be here at patient registration on Monday 08/08 for his procedure @ 0500. Pt acknowledged.

## 2022-08-06 LAB — BACTERIA SPEC AEROBE CULT: NO GROWTH

## 2022-08-08 ENCOUNTER — APPOINTMENT (OUTPATIENT)
Dept: GENERAL RADIOLOGY | Facility: HOSPITAL | Age: 52
End: 2022-08-08

## 2022-08-08 ENCOUNTER — HOSPITAL ENCOUNTER (OUTPATIENT)
Facility: HOSPITAL | Age: 52
Setting detail: HOSPITAL OUTPATIENT SURGERY
Discharge: HOME | End: 2022-08-08
Attending: UROLOGY | Admitting: UROLOGY

## 2022-08-08 ENCOUNTER — ANESTHESIA (OUTPATIENT)
Dept: PERIOP | Facility: HOSPITAL | Age: 52
End: 2022-08-08

## 2022-08-08 ENCOUNTER — ANESTHESIA EVENT (OUTPATIENT)
Dept: PERIOP | Facility: HOSPITAL | Age: 52
End: 2022-08-08

## 2022-08-08 VITALS
SYSTOLIC BLOOD PRESSURE: 152 MMHG | HEART RATE: 73 BPM | DIASTOLIC BLOOD PRESSURE: 99 MMHG | OXYGEN SATURATION: 95 % | TEMPERATURE: 97.1 F | RESPIRATION RATE: 16 BRPM

## 2022-08-08 DIAGNOSIS — N20.1 LEFT URETERAL STONE: ICD-10-CM

## 2022-08-08 LAB
GLUCOSE BLDC GLUCOMTR-MCNC: 123 MG/DL (ref 70–130)
GLUCOSE BLDC GLUCOMTR-MCNC: 129 MG/DL (ref 70–130)

## 2022-08-08 PROCEDURE — C1894 INTRO/SHEATH, NON-LASER: HCPCS | Performed by: UROLOGY

## 2022-08-08 PROCEDURE — C2617 STENT, NON-COR, TEM W/O DEL: HCPCS | Performed by: UROLOGY

## 2022-08-08 PROCEDURE — 88300 SURGICAL PATH GROSS: CPT | Performed by: UROLOGY

## 2022-08-08 PROCEDURE — 82962 GLUCOSE BLOOD TEST: CPT

## 2022-08-08 PROCEDURE — C1769 GUIDE WIRE: HCPCS | Performed by: UROLOGY

## 2022-08-08 PROCEDURE — 25010000002 ONDANSETRON PER 1 MG: Performed by: NURSE ANESTHETIST, CERTIFIED REGISTERED

## 2022-08-08 PROCEDURE — 25010000002 PROPOFOL 10 MG/ML EMULSION: Performed by: NURSE ANESTHETIST, CERTIFIED REGISTERED

## 2022-08-08 PROCEDURE — 25010000002 FENTANYL CITRATE (PF) 100 MCG/2ML SOLUTION: Performed by: NURSE ANESTHETIST, CERTIFIED REGISTERED

## 2022-08-08 PROCEDURE — 82360 CALCULUS ASSAY QUANT: CPT | Performed by: UROLOGY

## 2022-08-08 PROCEDURE — 25010000002 CEFAZOLIN PER 500 MG: Performed by: PHYSICIAN ASSISTANT

## 2022-08-08 PROCEDURE — 25010000002 IOPAMIDOL 61 % SOLUTION: Performed by: UROLOGY

## 2022-08-08 PROCEDURE — 52356 CYSTO/URETERO W/LITHOTRIPSY: CPT | Performed by: UROLOGY

## 2022-08-08 PROCEDURE — 74420 UROGRAPHY RTRGR +-KUB: CPT

## 2022-08-08 DEVICE — FLEXIMA™ URETERAL STENT
Type: IMPLANTABLE DEVICE | Site: URETER | Status: FUNCTIONAL
Brand: STRETCH™ VL

## 2022-08-08 RX ORDER — DEXTROSE MONOHYDRATE 25 G/50ML
12.5 INJECTION, SOLUTION INTRAVENOUS AS NEEDED
Status: DISCONTINUED | OUTPATIENT
Start: 2022-08-08 | End: 2022-08-08 | Stop reason: HOSPADM

## 2022-08-08 RX ORDER — SODIUM CHLORIDE 0.9 % (FLUSH) 0.9 %
10 SYRINGE (ML) INJECTION AS NEEDED
Status: DISCONTINUED | OUTPATIENT
Start: 2022-08-08 | End: 2022-08-08 | Stop reason: HOSPADM

## 2022-08-08 RX ORDER — FLUMAZENIL 0.1 MG/ML
0.2 INJECTION INTRAVENOUS AS NEEDED
Status: DISCONTINUED | OUTPATIENT
Start: 2022-08-08 | End: 2022-08-08 | Stop reason: HOSPADM

## 2022-08-08 RX ORDER — SODIUM CHLORIDE, SODIUM LACTATE, POTASSIUM CHLORIDE, CALCIUM CHLORIDE 600; 310; 30; 20 MG/100ML; MG/100ML; MG/100ML; MG/100ML
100 INJECTION, SOLUTION INTRAVENOUS CONTINUOUS
Status: DISCONTINUED | OUTPATIENT
Start: 2022-08-08 | End: 2022-08-08 | Stop reason: HOSPADM

## 2022-08-08 RX ORDER — DROPERIDOL 2.5 MG/ML
0.62 INJECTION, SOLUTION INTRAMUSCULAR; INTRAVENOUS ONCE AS NEEDED
Status: DISCONTINUED | OUTPATIENT
Start: 2022-08-08 | End: 2022-08-08 | Stop reason: HOSPADM

## 2022-08-08 RX ORDER — HYDROCODONE BITARTRATE AND ACETAMINOPHEN 5; 325 MG/1; MG/1
1 TABLET ORAL EVERY 6 HOURS PRN
Qty: 12 TABLET | Refills: 0 | Status: SHIPPED | OUTPATIENT
Start: 2022-08-08 | End: 2022-08-11

## 2022-08-08 RX ORDER — NALOXONE HCL 0.4 MG/ML
0.4 VIAL (ML) INJECTION AS NEEDED
Status: DISCONTINUED | OUTPATIENT
Start: 2022-08-08 | End: 2022-08-08 | Stop reason: HOSPADM

## 2022-08-08 RX ORDER — CEFAZOLIN SODIUM IN 0.9 % NACL 3 G/100 ML
3 INTRAVENOUS SOLUTION, PIGGYBACK (ML) INTRAVENOUS ONCE
Status: COMPLETED | OUTPATIENT
Start: 2022-08-08 | End: 2022-08-08

## 2022-08-08 RX ORDER — SUCCINYLCHOLINE/SOD CL,ISO/PF 200MG/10ML
SYRINGE (ML) INTRAVENOUS AS NEEDED
Status: DISCONTINUED | OUTPATIENT
Start: 2022-08-08 | End: 2022-08-08 | Stop reason: SURG

## 2022-08-08 RX ORDER — ONDANSETRON 2 MG/ML
4 INJECTION INTRAMUSCULAR; INTRAVENOUS ONCE AS NEEDED
Status: DISCONTINUED | OUTPATIENT
Start: 2022-08-08 | End: 2022-08-08 | Stop reason: HOSPADM

## 2022-08-08 RX ORDER — OXYCODONE HYDROCHLORIDE 5 MG/1
5 CAPSULE ORAL EVERY 4 HOURS PRN
COMMUNITY

## 2022-08-08 RX ORDER — SODIUM CHLORIDE 0.9 % (FLUSH) 0.9 %
10 SYRINGE (ML) INJECTION EVERY 12 HOURS SCHEDULED
Status: DISCONTINUED | OUTPATIENT
Start: 2022-08-08 | End: 2022-08-08 | Stop reason: HOSPADM

## 2022-08-08 RX ORDER — MAGNESIUM HYDROXIDE 1200 MG/15ML
LIQUID ORAL AS NEEDED
Status: DISCONTINUED | OUTPATIENT
Start: 2022-08-08 | End: 2022-08-08 | Stop reason: HOSPADM

## 2022-08-08 RX ORDER — LIDOCAINE HYDROCHLORIDE 20 MG/ML
INJECTION, SOLUTION EPIDURAL; INFILTRATION; INTRACAUDAL; PERINEURAL AS NEEDED
Status: DISCONTINUED | OUTPATIENT
Start: 2022-08-08 | End: 2022-08-08 | Stop reason: SURG

## 2022-08-08 RX ORDER — ROCURONIUM BROMIDE 10 MG/ML
INJECTION, SOLUTION INTRAVENOUS AS NEEDED
Status: DISCONTINUED | OUTPATIENT
Start: 2022-08-08 | End: 2022-08-08 | Stop reason: SURG

## 2022-08-08 RX ORDER — PROPOFOL 10 MG/ML
VIAL (ML) INTRAVENOUS AS NEEDED
Status: DISCONTINUED | OUTPATIENT
Start: 2022-08-08 | End: 2022-08-08 | Stop reason: SURG

## 2022-08-08 RX ORDER — SODIUM CHLORIDE, SODIUM LACTATE, POTASSIUM CHLORIDE, CALCIUM CHLORIDE 600; 310; 30; 20 MG/100ML; MG/100ML; MG/100ML; MG/100ML
9 INJECTION, SOLUTION INTRAVENOUS CONTINUOUS
Status: DISCONTINUED | OUTPATIENT
Start: 2022-08-08 | End: 2022-08-08 | Stop reason: HOSPADM

## 2022-08-08 RX ORDER — OXYCODONE AND ACETAMINOPHEN 7.5; 325 MG/1; MG/1
2 TABLET ORAL EVERY 4 HOURS PRN
Status: DISCONTINUED | OUTPATIENT
Start: 2022-08-08 | End: 2022-08-08 | Stop reason: HOSPADM

## 2022-08-08 RX ORDER — LABETALOL HYDROCHLORIDE 5 MG/ML
5 INJECTION, SOLUTION INTRAVENOUS
Status: DISCONTINUED | OUTPATIENT
Start: 2022-08-08 | End: 2022-08-08 | Stop reason: HOSPADM

## 2022-08-08 RX ORDER — ONDANSETRON 2 MG/ML
INJECTION INTRAMUSCULAR; INTRAVENOUS AS NEEDED
Status: DISCONTINUED | OUTPATIENT
Start: 2022-08-08 | End: 2022-08-08 | Stop reason: SURG

## 2022-08-08 RX ORDER — FENTANYL CITRATE 50 UG/ML
INJECTION, SOLUTION INTRAMUSCULAR; INTRAVENOUS AS NEEDED
Status: DISCONTINUED | OUTPATIENT
Start: 2022-08-08 | End: 2022-08-08 | Stop reason: SURG

## 2022-08-08 RX ORDER — IBUPROFEN 600 MG/1
600 TABLET ORAL ONCE AS NEEDED
Status: DISCONTINUED | OUTPATIENT
Start: 2022-08-08 | End: 2022-08-08 | Stop reason: HOSPADM

## 2022-08-08 RX ORDER — FENTANYL CITRATE 50 UG/ML
25 INJECTION, SOLUTION INTRAMUSCULAR; INTRAVENOUS
Status: DISCONTINUED | OUTPATIENT
Start: 2022-08-08 | End: 2022-08-08 | Stop reason: HOSPADM

## 2022-08-08 RX ORDER — OXYCODONE AND ACETAMINOPHEN 10; 325 MG/1; MG/1
1 TABLET ORAL ONCE AS NEEDED
Status: DISCONTINUED | OUTPATIENT
Start: 2022-08-08 | End: 2022-08-08 | Stop reason: HOSPADM

## 2022-08-08 RX ORDER — LIDOCAINE HYDROCHLORIDE 10 MG/ML
0.5 INJECTION, SOLUTION EPIDURAL; INFILTRATION; INTRACAUDAL; PERINEURAL ONCE AS NEEDED
Status: DISCONTINUED | OUTPATIENT
Start: 2022-08-08 | End: 2022-08-08 | Stop reason: HOSPADM

## 2022-08-08 RX ADMIN — FENTANYL CITRATE 50 MCG: 50 INJECTION, SOLUTION INTRAMUSCULAR; INTRAVENOUS at 07:38

## 2022-08-08 RX ADMIN — PROPOFOL 100 MG: 10 INJECTION, EMULSION INTRAVENOUS at 07:36

## 2022-08-08 RX ADMIN — FENTANYL CITRATE 100 MCG: 50 INJECTION, SOLUTION INTRAMUSCULAR; INTRAVENOUS at 07:02

## 2022-08-08 RX ADMIN — Medication 3 G: at 07:09

## 2022-08-08 RX ADMIN — PROPOFOL 100 MG: 10 INJECTION, EMULSION INTRAVENOUS at 07:07

## 2022-08-08 RX ADMIN — ROCURONIUM BROMIDE 10 MG: 10 SOLUTION INTRAVENOUS at 07:04

## 2022-08-08 RX ADMIN — FENTANYL CITRATE 50 MCG: 50 INJECTION, SOLUTION INTRAMUSCULAR; INTRAVENOUS at 07:36

## 2022-08-08 RX ADMIN — PROPOFOL 100 MG: 10 INJECTION, EMULSION INTRAVENOUS at 07:04

## 2022-08-08 RX ADMIN — Medication 200 MG: at 07:05

## 2022-08-08 RX ADMIN — PROPOFOL 100 MG: 10 INJECTION, EMULSION INTRAVENOUS at 07:03

## 2022-08-08 RX ADMIN — LIDOCAINE HYDROCHLORIDE 100 MG: 20 INJECTION, SOLUTION EPIDURAL; INFILTRATION; INTRACAUDAL at 07:02

## 2022-08-08 RX ADMIN — ONDANSETRON 4 MG: 2 INJECTION INTRAMUSCULAR; INTRAVENOUS at 07:55

## 2022-08-08 RX ADMIN — SODIUM CHLORIDE, POTASSIUM CHLORIDE, SODIUM LACTATE AND CALCIUM CHLORIDE 100 ML/HR: 600; 310; 30; 20 INJECTION, SOLUTION INTRAVENOUS at 06:34

## 2022-08-08 NOTE — ANESTHESIA PREPROCEDURE EVALUATION
" Anesthesia Evaluation     Patient summary reviewed   history of anesthetic complications (lots of \"shaking\" after prior anesthestic ):  NPO Solid Status: > 6 hours             Airway   Mallampati: II  TM distance: >3 FB  Neck ROM: full  Large neck circumference  Dental - normal exam     Pulmonary    (+) a smoker Former, sleep apnea on CPAP,   Cardiovascular   Exercise tolerance: good (4-7 METS)    (+) hypertension, hyperlipidemia,   (-) pacemaker, valvular problems/murmurs, past MI, cardiac stents, CABG      Neuro/Psych  (-) seizures, CVA  GI/Hepatic/Renal/Endo    (+) morbid obesity,  renal disease stones, diabetes mellitus,     Musculoskeletal     Abdominal    Substance History      OB/GYN          Other   arthritis,                        Anesthesia Plan    ASA 3     general     intravenous induction     Anesthetic plan, risks, benefits, and alternatives have been provided, discussed and informed consent has been obtained with: patient.        CODE STATUS:       "

## 2022-08-08 NOTE — OP NOTE
Operative Summary    Randell Reddy  Date of Procedure: 8/8/2022    Pre-op Diagnosis:   Left ureteral stone [N20.1]    Post-op Diagnosis:     Post-Op Diagnosis Codes:     * Left ureteral stone [N20.1]    Procedure/CPT® Codes:      Procedure(s):  URETEROSCOPY LASER LITHOTRIPSY WITH STENT INSERTION LEFT    Surgeon(s):  Fernando Trinidad MD    Anesthesia: General    Staff:   Circulator: Adore Goldberg RN  Scrub Person: Sandra Molina; Coleen Nair RN    Indications for procedure:  Left proximal ureteral calculus, impacted status post ureteral stent placement    Findings:   Cystoscopy findings: Lateral lobe enlargement was mild.  No urothelial lesions in bladder.  Moderate periureteral edema from stent.  Ureteroscopic findings: Yellow crystalline stone with more of a dark brown crystalline core probably consistent with calcium oxalate monohydrate    Procedure details:     After appropriate anesthesia, positioning, prep and drape, timeout protocol was observed.      A 22 Malaysian cystoscope sheath with a 30° lens is inserted.   30 and 70 degree lens inspection of bladder is carried out.  Cystoscopy findings are described above.    The stent is seen exiting the left ureteral orifice.  Its grasped and pulled the external urethral meatus.A 0.038 mm Sensor guidewire is then passed under direct fluoroscopic vision through the stent and m I manipulated this up into the renal pelvis to make it easier to fragment the stone and remove the pieces with a basket.  Anipulated by the stone to reach the renal pelvis.     At this point a dual-lumen catheter is used to pass a second wire, Amplatz 0.038 mm Super Stiff wire,  which served as the working wire.  I then passed an 11 Malaysian/13 Malaysian x 36 cm ureteral access sheath is passed over the working wire under fluoroscopic vision below the stone.  I then took the Dunham digital flexible ureteroscope negotiated this through the access sheath and ureter to where the stone(s) was  located in the proximal ureter..  A 200 µ holmium laser fiber is used with initial settings of 0.8 J and a repetition rate of 8 that were titrated upward for efficacy.  After the stone was felt to be fragmented adequately I took a 0 tip basket and remove fragments that would be concerning for ureteral obstruction as well as stone analysis.  The ureteroscope was then reintroduced all the way to the ureteropelvic junction and no other obstructing stones are identified.  Ureteroscope was then removed.           The wire is then back loaded through the cystoscope and the orifice visualized with the wire appropriately positioned. I passed a ureteral stent over the guidewire into the right renal pelvis and pulled the wire back seeing a good curl in the renal pelvis on fluoroscopy. The string cut at the level of the external urethral meatus the wire is removed in its entirety and I could see a good curl of the stent in the bladder. The bladder is then drained. The patient tolerated the procedure without difficulty.      Patient is now transferred to recovery room in stable condition.      Estimated Blood Loss: Less than 30 mL    Specimens:                Specimens     ID Source Type Tests Collected By Collected At Frozen?    1 Ureter, Left Calculus · STONE ANALYSIS   Fernando Trinidad MD 8/8/22 0807     This specimen was not marked as sent.            Drains:   Ureteral Drain/Stent Left ureter 6 Fr. (Active)       Complications: none    Plan: Office follow-up later this week to have stent removed.    Fernando Trinidad MD     Date: 8/8/2022  Time: 08:07 CDT

## 2022-08-08 NOTE — ANESTHESIA PROCEDURE NOTES
Airway  Urgency: elective    Date/Time: 8/8/2022 7:08 AM  Airway not difficult    General Information and Staff    Patient location during procedure: OR  CRNA/CAA: Wale Lagos CRNA    Indications and Patient Condition  Indications for airway management: airway protection    Preoxygenated: yes  MILS maintained throughout  Mask difficulty assessment: 2 - vent by mask + OA or adjuvant +/- NMBA    Final Airway Details  Final airway type: endotracheal airway      Successful airway: ETT  Cuffed: yes   Successful intubation technique: direct laryngoscopy and video laryngoscopy  Facilitating devices/methods: intubating stylet  Endotracheal tube insertion site: oral  Blade: Silver  Blade size: 4  ETT size (mm): 7.5  Cormack-Lehane Classification: grade IIa - partial view of glottis  Placement verified by: chest auscultation and capnometry   Cuff volume (mL): 8  Measured from: lips  ETT/EBT  to lips (cm): 23  Number of attempts at approach: 2  Assessment: lips, teeth, and gum same as pre-op and atraumatic intubation    Additional Comments  Intubated by LACY Arreola. First attempt using a Weaver 2 blade, unable to get adequate view. Second attempt using Silver 4 was able to obtain Grade IIa view and easily intubate.

## 2022-08-08 NOTE — ANESTHESIA POSTPROCEDURE EVALUATION
Patient: Randell Reddy    Procedure Summary     Date: 08/08/22 Room / Location:  [facility] OR  /  [facility]    Anesthesia Start: 0659 Anesthesia Stop: 0814    Procedure: URETEROSCOPY LASER LITHOTRIPSY WITH STENT INSERTION LEFT (Left Ureter) Diagnosis:       Left ureteral stone      (Left ureteral stone [N20.1])    Surgeons: Fernando Trinidad MD Provider: Wale Lagos CRNA    Anesthesia Type: general ASA Status: 3          Anesthesia Type: general    Vitals  Vitals Value Taken Time   /76 08/08/22 0846   Temp 97.1 °F (36.2 °C) 08/08/22 0845   Pulse 75 08/08/22 0855   Resp 18 08/08/22 0845   SpO2 95 % 08/08/22 0855   Vitals shown include unvalidated device data.        Post Anesthesia Care and Evaluation    Patient location during evaluation: PACU  Patient participation: complete - patient participated  Level of consciousness: awake and alert  Pain management: adequate    Airway patency: patent  Anesthetic complications: No anesthetic complications  PONV Status: none  Cardiovascular status: acceptable and hemodynamically stable  Respiratory status: acceptable  Hydration status: acceptable    Comments: Blood pressure 143/76, pulse 79, temperature 97.1 °F (36.2 °C), temperature source Temporal, resp. rate 18, SpO2 96 %.    Patient discharged from PACU based upon Tess score. Please see RN notes for further details

## 2022-08-10 NOTE — PROGRESS NOTES
CC: I am here for the doctor to look at my bladder and get this stent out.     Cystoscopy with stent removal    Indications: Status post ureteroscopy    Prep: Hibiclens solution    Instrumentation: Diaz digital flexible cystoscope, Alligator forceps    Procedure: After lidocaine jelly is instilled into the urethra for 10 minutes, the well-lubricated cystoscope was introduced through the urethra into the bladder.  The stent is seen protruding from the left side.  The alligator forceps or used to grasp the stent and pull it out the urethra.  The patient tolerated the procedure well.    Diagnoses and all orders for this visit:    1. Left ureteral stone (Primary)        Follow up:    Routine follow up    Fernando Trinidad MD  8/10/2022  11:31 CDT

## 2022-08-11 ENCOUNTER — PROCEDURE VISIT (OUTPATIENT)
Dept: UROLOGY | Facility: CLINIC | Age: 52
End: 2022-08-11

## 2022-08-11 DIAGNOSIS — N20.1 LEFT URETERAL STONE: Primary | ICD-10-CM

## 2022-08-11 PROCEDURE — 52310 CYSTOSCOPY AND TREATMENT: CPT | Performed by: UROLOGY

## 2022-08-22 LAB
LAB AP CASE REPORT: NORMAL
LAB AP CLINICAL INFORMATION: NORMAL
LAB AP DIAGNOSIS COMMENT: NORMAL
Lab: NORMAL
PATH REPORT.FINAL DX SPEC: NORMAL
PATH REPORT.GROSS SPEC: NORMAL

## 2022-09-20 ENCOUNTER — TELEPHONE (OUTPATIENT)
Dept: UROLOGY | Facility: CLINIC | Age: 52
End: 2022-09-20

## 2022-09-20 NOTE — TELEPHONE ENCOUNTER
I spoke with pt to remind him to get imaging done before his appt Monday. Pt stated it will be done Friday

## 2022-09-20 NOTE — PROGRESS NOTES
Subjective    Mr. Reddy is 51 y.o. male    Chief Complaint: kidney stones     History of Present Illness  Patient is a 51-year-old gentleman who presents for 6-week follow-up after getting kidney ultrasound has a history of ureteroscopy laser lithotripsy with stent insertion on the left side for left ureteral stone the stent was removed 08/11/2022.  He had a approximate 10 mm left proximal stone.  He had been doing well until last few days ago he developed some right lower back pain that he feels feels just like a stone although not severe.  His ultrasound shows no hydronephrosis at all on the right or left side.  Possible tiny stones in the right kidney also these are also visible on the CT scan.  There is a benign-appearing small cyst also in the left kidney.  His urine is clear.    The following portions of the patient's history were reviewed and updated as appropriate: allergies, current medications, past family history, past medical history, past social history, past surgical history and problem list.    Review of Systems   Constitutional: Negative.    Gastrointestinal: Negative.    Genitourinary: Negative for difficulty urinating, flank pain and hematuria.   Musculoskeletal: Positive for back pain.         Current Outpatient Medications:   •  acetaminophen (TYLENOL) 325 MG tablet, Take 2 tablets by mouth Every 4 (Four) Hours As Needed for Mild Pain ., Disp: , Rfl:   •  amLODIPine (NORVASC) 5 MG tablet, Take 5 mg by mouth Daily., Disp: , Rfl:   •  ascorbic acid (VITAMIN C) 1000 MG tablet, Take 1,000 mg by mouth Daily., Disp: , Rfl:   •  EPINEPHrine (EPIPEN) 0.3 MG/0.3ML solution auto-injector injection, Inject 1 syringe into the appropriate muscle as directed by prescriber., Disp: , Rfl:   •  folic acid (FOLVITE) 800 MCG tablet, Take 800 mcg by mouth Daily., Disp: , Rfl:   •  Glucose-Vitamin C-Vitamin D (TRUEplus Glucose) chewable tablet, Chew 1 tablet Daily As Needed for Low Blood Sugar., Disp: , Rfl:   •   metFORMIN ER (GLUCOPHAGE-XR) 500 MG 24 hr tablet, Take 500 mg by mouth Daily With Breakfast., Disp: , Rfl:   •  oxyCODONE (OXY-IR) 5 MG capsule, Take 5 mg by mouth Every 4 (Four) Hours As Needed for Moderate Pain ., Disp: , Rfl:   •  PROAIR  (90 Base) MCG/ACT inhaler, Inhale 1 puff Every 4 (Four) Hours As Needed for Wheezing or Shortness of Air., Disp: , Rfl:   •  zinc gluconate 50 MG tablet, Take 50 mg by mouth Daily., Disp: , Rfl:   •  tamsulosin (FLOMAX) 0.4 MG capsule 24 hr capsule, Take 1 capsule by mouth Daily. For kidney stone expulsion, Disp: 30 capsule, Rfl: 1    Past Medical History:   Diagnosis Date   • COVID-19    • Diabetes mellitus (HCC)    • Elevated cholesterol    • Hypertension    • Injury of back    • Kidney stone    • Sleep apnea     cpap       Past Surgical History:   Procedure Laterality Date   • BACK SURGERY      prior discectomies at L3-4 and L4-5 and a laminectomy at L5 and S1 ([facility] and [facility])times 2   • CYSTOSCOPY, RETROGRADE PYELOGRAM AND STENT INSERTION Left 7/26/2022    Procedure: CYSTOSCOPY RETROGRADE PYELOGRAM AND STENT INSERTION;  Surgeon: Dat Vega MD;  Location: [facility] OR;  Service: Urology;  Laterality: Left;   • EXTRACORPOREAL SHOCK WAVE LITHOTRIPSY (ESWL)     • EXTRACORPOREAL SHOCKWAVE LITHOTRIPSY (ESWL), STENT INSERTION/REMOVAL Left 7/1/2022    Procedure: EXTRACORPOREAL SHOCKWAVE LITHOTRIPSY LEFT;  Surgeon: Lawson Pulido MD;  Location:  [facility] OR;  Service: Urology;  Laterality: Left;   • FRACTURE SURGERY Right     placed plate and one month later removed   • TONSILLECTOMY     • URETEROSCOPY LASER LITHOTRIPSY WITH STENT INSERTION Left 8/8/2022    Procedure: URETEROSCOPY LASER LITHOTRIPSY WITH STENT INSERTION LEFT;  Surgeon: Fernando Trinidad MD;  Location:  [facility] OR;  Service: Urology;  Laterality: Left;   • VASECTOMY         Social History     Socioeconomic History   • Marital status:    Tobacco Use   • Smoking status: Former Smoker      "Packs/day: 1.00     Years: 15.00     Pack years: 15.00     Types: Cigarettes     Quit date:      Years since quittin.7   • Smokeless tobacco: Never Used   Vaping Use   • Vaping Use: Never used   Substance and Sexual Activity   • Alcohol use: No   • Drug use: No   • Sexual activity: Yes     Partners: Female       Family History   Problem Relation Age of Onset   • No Known Problems Father    • No Known Problems Mother        Objective    Temp 97.8 °F (36.6 °C)   Ht 188 cm (74\")   Wt (!) 168 kg (371 lb 6.4 oz)   BMI 47.68 kg/m²     Physical Exam  Vitals reviewed.   HENT:      Head: Normocephalic and atraumatic.      Nose: No congestion.   Pulmonary:      Effort: Pulmonary effort is normal.   Musculoskeletal:         General: Tenderness present.   Skin:     Coloration: Skin is not pale.   Neurological:      Mental Status: He is alert and oriented to person, place, and time.   Psychiatric:         Mood and Affect: Mood normal.         Behavior: Behavior normal.             Results for orders placed or performed in visit on 22   POC Urinalysis Dipstick, Multipro    Specimen: Urine   Result Value Ref Range    Color Yellow Yellow, Straw, Dark Yellow, Liya    Clarity, UA Clear Clear    Glucose, UA Negative Negative mg/dL    Bilirubin Negative Negative    Ketones, UA Negative Negative    Specific Gravity  1.020 1.005 - 1.030    Blood, UA Negative Negative    pH, Urine 7.0 5.0 - 8.0    Protein, POC Trace (A) Negative mg/dL    Urobilinogen, UA 0.2 E.U./dL Normal, 0.2 E.U./dL    Nitrite, UA Negative Negative    Leukocytes Negative Negative   IPSS Questionnaire (AUA-7):  Incomplete emptying  Over the past month, how often have you had a sensation of not emptying your bladder completely after you finish?: Less than 1 time in 5 (22 1534)  Frequency  Over the past month, how often have you had to urinate again less than two hours after you finishing urinating ?: Less than half the time (22 " 1534)  Intermittency  Over the past month, how often have you found you stopped and started again several time when you urinated ?: Not at all (09/26/22 1534)  Urgency  Over the last month, how difficult  have you found it to postpone urination ?: Not at all (09/26/22 1534)  Weak Stream  Over the past month, how often have you had a weak urinary stream ?: Less than 1 time in 5 (09/26/22 1534)  Straining  Over the past month, how often have you had to push or strain to begin urination ?: Less than 1 time 5 (09/26/22 1534)  Nocturia  Over the past month, how many times did you most typically get up to urinate from the time you went to bed until the time you got up in the morning ?: Less than 1 time in 5 (09/26/22 1534)  Quality of life due to urinary symptoms  If you were to spend the rest of your life with your urinary condition the way it is now, how would feel about that?: Mostly dissatified (09/26/22 1534)    Scores  Total IPSS Score: (!) 6 (09/26/22 1534)  Total Score = Symtomatic Level: Mildly symptomatic: 0-7 (09/26/22 1534)     Scrotal ultrasound independent review:  Both kidneys look normal in size shape echogenicity there is no hydronephrosis noted in either the right or left kidney questionable small calcifications in the right kidney which most likely of the small stone seen on previous CT scan done July 2022.  There is a small benign-appearing cyst in the left kidney.    Assessment and Plan    Diagnoses and all orders for this visit:    1. Left ureteral stone (Primary)  -     POC Urinalysis Dipstick, Multipro  -     XR Abdomen KUB; Future    2. Acute right-sided low back pain without sciatica    There are tiny stones in the right kidney as seen on CT scan previously at the larger left ureteral stone was treated with ureteroscopy there is no further hydronephrosis in the left or right kidney.  Patient is having right lower back pain which started a few days ago if it is one of the small stones that were in  the right kidney this is a very passable stone there certainly is no obstruction seen on the ultrasound.  If his pain continues will need to get a CT scan to rule out stone in the right ureter.  Otherwise follow-up 3 months KUB prior.

## 2022-09-23 ENCOUNTER — TRANSCRIBE ORDERS (OUTPATIENT)
Dept: ADMINISTRATIVE | Facility: HOSPITAL | Age: 52
End: 2022-09-23

## 2022-09-23 ENCOUNTER — HOSPITAL ENCOUNTER (OUTPATIENT)
Dept: ULTRASOUND IMAGING | Facility: HOSPITAL | Age: 52
Discharge: HOME | End: 2022-09-23

## 2022-09-23 ENCOUNTER — LAB (OUTPATIENT)
Dept: LAB | Facility: HOSPITAL | Age: 52
End: 2022-09-23

## 2022-09-23 DIAGNOSIS — N20.1 LEFT URETERAL STONE: ICD-10-CM

## 2022-09-23 DIAGNOSIS — Z12.5 SCREENING FOR PROSTATE CANCER: ICD-10-CM

## 2022-09-23 DIAGNOSIS — Z00.00 ROUTINE GENERAL MEDICAL EXAMINATION AT A HEALTH CARE FACILITY: ICD-10-CM

## 2022-09-23 DIAGNOSIS — R73.03 DIABETES MELLITUS, LATENT: ICD-10-CM

## 2022-09-23 DIAGNOSIS — I10 ESSENTIAL HYPERTENSION, MALIGNANT: ICD-10-CM

## 2022-09-23 DIAGNOSIS — Z00.00 ROUTINE GENERAL MEDICAL EXAMINATION AT A HEALTH CARE FACILITY: Primary | ICD-10-CM

## 2022-09-23 LAB
ALBUMIN SERPL-MCNC: 4.5 G/DL (ref 3.5–5.2)
ALBUMIN/GLOB SERPL: 1.3 G/DL
ALP SERPL-CCNC: 102 U/L (ref 39–117)
ALT SERPL W P-5'-P-CCNC: 54 U/L (ref 1–41)
ANION GAP SERPL CALCULATED.3IONS-SCNC: 12 MMOL/L (ref 5–15)
AST SERPL-CCNC: 39 U/L (ref 1–40)
BASOPHILS # BLD AUTO: 0.03 10*3/MM3 (ref 0–0.2)
BASOPHILS NFR BLD AUTO: 0.4 % (ref 0–1.5)
BILIRUB SERPL-MCNC: 0.4 MG/DL (ref 0–1.2)
BILIRUB UR QL STRIP: NEGATIVE
BUN SERPL-MCNC: 15 MG/DL (ref 6–20)
BUN/CREAT SERPL: 18.5 (ref 7–25)
CALCIUM SPEC-SCNC: 9.5 MG/DL (ref 8.6–10.5)
CHLORIDE SERPL-SCNC: 102 MMOL/L (ref 98–107)
CLARITY UR: CLEAR
CO2 SERPL-SCNC: 24 MMOL/L (ref 22–29)
COLOR UR: YELLOW
CREAT SERPL-MCNC: 0.81 MG/DL (ref 0.76–1.27)
DEPRECATED RDW RBC AUTO: 45.4 FL (ref 37–54)
EGFRCR SERPLBLD CKD-EPI 2021: 107.4 ML/MIN/1.73
EOSINOPHIL # BLD AUTO: 0.1 10*3/MM3 (ref 0–0.4)
EOSINOPHIL NFR BLD AUTO: 1.4 % (ref 0.3–6.2)
ERYTHROCYTE [DISTWIDTH] IN BLOOD BY AUTOMATED COUNT: 13.4 % (ref 12.3–15.4)
GLOBULIN UR ELPH-MCNC: 3.4 GM/DL
GLUCOSE SERPL-MCNC: 92 MG/DL (ref 65–99)
GLUCOSE UR STRIP-MCNC: NEGATIVE MG/DL
HCT VFR BLD AUTO: 43.4 % (ref 37.5–51)
HGB BLD-MCNC: 13.5 G/DL (ref 13–17.7)
HGB UR QL STRIP.AUTO: NEGATIVE
IMM GRANULOCYTES # BLD AUTO: 0.02 10*3/MM3 (ref 0–0.05)
IMM GRANULOCYTES NFR BLD AUTO: 0.3 % (ref 0–0.5)
KETONES UR QL STRIP: NEGATIVE
LEUKOCYTE ESTERASE UR QL STRIP.AUTO: NEGATIVE
LYMPHOCYTES # BLD AUTO: 1.93 10*3/MM3 (ref 0.7–3.1)
LYMPHOCYTES NFR BLD AUTO: 27.7 % (ref 19.6–45.3)
MCH RBC QN AUTO: 28.2 PG (ref 26.6–33)
MCHC RBC AUTO-ENTMCNC: 31.1 G/DL (ref 31.5–35.7)
MCV RBC AUTO: 90.6 FL (ref 79–97)
MONOCYTES # BLD AUTO: 0.55 10*3/MM3 (ref 0.1–0.9)
MONOCYTES NFR BLD AUTO: 7.9 % (ref 5–12)
NEUTROPHILS NFR BLD AUTO: 4.34 10*3/MM3 (ref 1.7–7)
NEUTROPHILS NFR BLD AUTO: 62.3 % (ref 42.7–76)
NITRITE UR QL STRIP: NEGATIVE
NRBC BLD AUTO-RTO: 0 /100 WBC (ref 0–0.2)
PH UR STRIP.AUTO: 5.5 [PH] (ref 5–8)
PLATELET # BLD AUTO: 199 10*3/MM3 (ref 140–450)
PMV BLD AUTO: 11 FL (ref 6–12)
POTASSIUM SERPL-SCNC: 4.3 MMOL/L (ref 3.5–5.2)
PROT SERPL-MCNC: 7.9 G/DL (ref 6–8.5)
PROT UR QL STRIP: ABNORMAL
RBC # BLD AUTO: 4.79 10*6/MM3 (ref 4.14–5.8)
SODIUM SERPL-SCNC: 138 MMOL/L (ref 136–145)
SP GR UR STRIP: 1.02 (ref 1–1.03)
UROBILINOGEN UR QL STRIP: ABNORMAL
WBC NRBC COR # BLD: 6.97 10*3/MM3 (ref 3.4–10.8)

## 2022-09-23 PROCEDURE — 80061 LIPID PANEL: CPT

## 2022-09-23 PROCEDURE — 82570 ASSAY OF URINE CREATININE: CPT

## 2022-09-23 PROCEDURE — 82043 UR ALBUMIN QUANTITATIVE: CPT

## 2022-09-23 PROCEDURE — 80053 COMPREHEN METABOLIC PANEL: CPT

## 2022-09-23 PROCEDURE — 36415 COLL VENOUS BLD VENIPUNCTURE: CPT

## 2022-09-23 PROCEDURE — 85025 COMPLETE CBC W/AUTO DIFF WBC: CPT

## 2022-09-23 PROCEDURE — 76775 US EXAM ABDO BACK WALL LIM: CPT

## 2022-09-23 PROCEDURE — 81003 URINALYSIS AUTO W/O SCOPE: CPT

## 2022-09-23 PROCEDURE — 83036 HEMOGLOBIN GLYCOSYLATED A1C: CPT

## 2022-09-23 PROCEDURE — G0103 PSA SCREENING: HCPCS

## 2022-09-24 LAB
ALBUMIN UR-MCNC: 7.4 MG/DL
CHOLEST SERPL-MCNC: 149 MG/DL (ref 0–200)
CREAT UR-MCNC: 233 MG/DL
HBA1C MFR BLD: 6.1 % (ref 4.8–5.6)
HDLC SERPL-MCNC: 32 MG/DL (ref 40–60)
LDLC SERPL CALC-MCNC: 92 MG/DL (ref 0–100)
LDLC/HDLC SERPL: 2.8 {RATIO}
MICROALBUMIN/CREAT UR: 31.8 MG/G
PSA SERPL-MCNC: 0.28 NG/ML (ref 0–4)
TRIGL SERPL-MCNC: 137 MG/DL (ref 0–150)
VLDLC SERPL-MCNC: 25 MG/DL (ref 5–40)

## 2022-09-26 ENCOUNTER — OFFICE VISIT (OUTPATIENT)
Dept: UROLOGY | Facility: CLINIC | Age: 52
End: 2022-09-26

## 2022-09-26 VITALS — WEIGHT: 315 LBS | HEIGHT: 74 IN | BODY MASS INDEX: 40.43 KG/M2 | TEMPERATURE: 97.8 F

## 2022-09-26 DIAGNOSIS — N20.1 LEFT URETERAL STONE: Primary | ICD-10-CM

## 2022-09-26 DIAGNOSIS — M54.50 ACUTE RIGHT-SIDED LOW BACK PAIN WITHOUT SCIATICA: ICD-10-CM

## 2022-09-26 LAB
BILIRUB BLD-MCNC: NEGATIVE MG/DL
CLARITY, POC: CLEAR
COLOR UR: YELLOW
GLUCOSE UR STRIP-MCNC: NEGATIVE MG/DL
KETONES UR QL: NEGATIVE
LEUKOCYTE EST, POC: NEGATIVE
NITRITE UR-MCNC: NEGATIVE MG/ML
PH UR: 7 [PH] (ref 5–8)
PROT UR STRIP-MCNC: ABNORMAL MG/DL
RBC # UR STRIP: NEGATIVE /UL
SP GR UR: 1.02 (ref 1–1.03)
UROBILINOGEN UR QL: ABNORMAL

## 2022-09-26 PROCEDURE — 99214 OFFICE O/P EST MOD 30 MIN: CPT | Performed by: PHYSICIAN ASSISTANT

## 2022-09-26 PROCEDURE — 81001 URINALYSIS AUTO W/SCOPE: CPT | Performed by: PHYSICIAN ASSISTANT

## 2022-12-09 NOTE — PROGRESS NOTES
Subjective    Mr. Reddy is 52 y.o. male    Chief Complaint: 3 months follow up kidney stones/kub    History of Present Illness  Patient is a 52-year-old gentleman with history of kidney stones he underwent ureteroscopy laser lithotripsy for left proximal ureteral stone 08/08/2022 he came in for stone follow-up 09/26/2022 with renal ultrasound which showed resolution of any hydronephrosis.  Past month month and a half he has had what he describes as bilateral flank pain with at times radiation to the groin.  It comes and goes its not severe he has had few episodes of gross hematuria.  KUB shows no obvious stones CT scan of previously showed small stones in the left kidney and tiny stones in the right kidney most likely these are of a size that are difficult to visualize on the KUB.    The following portions of the patient's history were reviewed and updated as appropriate: allergies, current medications, past family history, past medical history, past social history, past surgical history and problem list.    Review of Systems   Gastrointestinal: Negative.    Genitourinary: Positive for frequency, hematuria and testicular pain.   All other systems reviewed and are negative.        Current Outpatient Medications:   •  acetaminophen (TYLENOL) 325 MG tablet, Take 2 tablets by mouth Every 4 (Four) Hours As Needed for Mild Pain ., Disp: , Rfl:   •  amLODIPine (NORVASC) 5 MG tablet, Take 5 mg by mouth Daily., Disp: , Rfl:   •  ascorbic acid (VITAMIN C) 1000 MG tablet, Take 1,000 mg by mouth Daily., Disp: , Rfl:   •  EPINEPHrine (EPIPEN) 0.3 MG/0.3ML solution auto-injector injection, Inject 1 syringe into the appropriate muscle as directed by prescriber., Disp: , Rfl:   •  folic acid (FOLVITE) 800 MCG tablet, Take 800 mcg by mouth Daily., Disp: , Rfl:   •  Glucose-Vitamin C-Vitamin D (TRUEplus Glucose) chewable tablet, Chew 1 tablet Daily As Needed for Low Blood Sugar., Disp: , Rfl:   •  metFORMIN ER (GLUCOPHAGE-XR) 500 MG  24 hr tablet, Take 500 mg by mouth Daily With Breakfast., Disp: , Rfl:   •  oxyCODONE (OXY-IR) 5 MG capsule, Take 5 mg by mouth Every 4 (Four) Hours As Needed for Moderate Pain ., Disp: , Rfl:   •  PROAIR  (90 Base) MCG/ACT inhaler, Inhale 1 puff Every 4 (Four) Hours As Needed for Wheezing or Shortness of Air., Disp: , Rfl:   •  tamsulosin (FLOMAX) 0.4 MG capsule 24 hr capsule, Take 1 capsule by mouth Daily. For kidney stone expulsion, Disp: 30 capsule, Rfl: 1  •  zinc gluconate 50 MG tablet, Take 50 mg by mouth Daily., Disp: , Rfl:     Past Medical History:   Diagnosis Date   • COVID-19    • Diabetes mellitus (HCC)    • Elevated cholesterol    • Hypertension    • Injury of back    • Kidney stone    • Sleep apnea     cpap       Past Surgical History:   Procedure Laterality Date   • BACK SURGERY      prior discectomies at L3-4 and L4-5 and a laminectomy at L5 and S1 ([facility])times 2   • CYSTOSCOPY, RETROGRADE PYELOGRAM AND STENT INSERTION Left 7/26/2022    Procedure: CYSTOSCOPY RETROGRADE PYELOGRAM AND STENT INSERTION;  Surgeon: Dat Vega MD;  Location[facility] OR;  Service: Urology;  Laterality: Left;   • EXTRACORPOREAL SHOCK WAVE LITHOTRIPSY (ESWL)     • EXTRACORPOREAL SHOCKWAVE LITHOTRIPSY (ESWL), STENT INSERTION/REMOVAL Left 7/1/2022    Procedure: EXTRACORPOREAL SHOCKWAVE LITHOTRIPSY LEFT;  Surgeon: Lawson Pulido MD;  Location:  [facility] OR;  Service: Urology;  Laterality: Left;   • FRACTURE SURGERY Right     placed plate and one month later removed   • TONSILLECTOMY     • URETEROSCOPY LASER LITHOTRIPSY WITH STENT INSERTION Left 8/8/2022    Procedure: URETEROSCOPY LASER LITHOTRIPSY WITH STENT INSERTION LEFT;  Surgeon: Fernando Trinidad MD;  Location:  [facility] OR;  Service: Urology;  Laterality: Left;   • VASECTOMY         Social History     Socioeconomic History   • Marital status:    Tobacco Use   • Smoking status: Former     Packs/day: 1.00     Years: 15.00     Pack years:  "15.00     Types: Cigarettes     Quit date:      Years since quittin.9   • Smokeless tobacco: Never   Vaping Use   • Vaping Use: Never used   Substance and Sexual Activity   • Alcohol use: No   • Drug use: No   • Sexual activity: Yes     Partners: Female       Family History   Problem Relation Age of Onset   • No Known Problems Father    • No Known Problems Mother        Objective    Temp 96.8 °F (36 °C)   Ht 188 cm (74\")   Wt (!) 173 kg (380 lb 9.6 oz)   BMI 48.87 kg/m²     Physical Exam  Vitals reviewed.   Constitutional:       General: He is not in acute distress.     Appearance: Normal appearance. He is not toxic-appearing.   HENT:      Head: Normocephalic and atraumatic.   Pulmonary:      Effort: Pulmonary effort is normal.   Abdominal:      Palpations: Abdomen is soft.      Tenderness: There is right CVA tenderness and left CVA tenderness.   Skin:     Coloration: Skin is not pale.   Neurological:      Mental Status: He is alert and oriented to person, place, and time.   Psychiatric:         Mood and Affect: Mood normal.         Behavior: Behavior normal.             Results for orders placed or performed in visit on 22   POC Urinalysis Dipstick, Multipro    Specimen: Urine   Result Value Ref Range    Color Yellow Yellow, Straw, Dark Yellow, Liya    Clarity, UA Clear Clear    Glucose, UA Negative Negative mg/dL    Bilirubin Negative Negative    Ketones, UA Negative Negative    Specific Gravity  1.030 1.005 - 1.030    Blood, UA Negative Negative    pH, Urine 6.0 5.0 - 8.0    Protein, POC 2+ (A) Negative mg/dL    Urobilinogen, UA 0.2 E.U./dL Normal, 0.2 E.U./dL    Nitrite, UA Negative Negative    Leukocytes Negative Negative       KUB independent review    A KUB is available for me to review today.  The image is inspected for a bowel gas pattern and the general bone structure of the spine and pelvis. The kidneys are then inspected closely.  Renal outline is noted if identifiable. The kidney, " collecting system, and anticipated path of the ureter are examined for calcifications including those in the true pelvis.  This film reveals:    On the right there are no calcificaitons seen in the kidney or the expected course of the ureter. .    On the left there are no calcificaitons seen in the kidney or the expected course of the ureter. .    Assessment and Plan    Diagnoses and all orders for this visit:    1. Bilateral flank pain (Primary)  -     POC Urinalysis Dipstick, Multipro    2. Kidney stones  -     XR Abdomen KUB; Future    Patient had approximately a month and a half back pain which is intermittent and nature comes and goes at times it radiates into his groin.  Also states he had a few episodes of hematuria.  He had a 10 mm left proximal ureteral stone treated successfully with ureteroscopy.  Follow-up ultrasound done revealed no hydronephrosis in either kidney CT did show tiny stones in the kidneys.  I have reviewed CT scan to see whether there is a ureteral stone passing at this point after discussion patient like to hold off his told him if he should have another episode of hematuria or pain gets worse or does not resolve he needs a CT scan without contrast otherwise follow-up 3 months KUB prior.

## 2022-12-12 ENCOUNTER — HOSPITAL ENCOUNTER (OUTPATIENT)
Dept: GENERAL RADIOLOGY | Facility: HOSPITAL | Age: 52
Discharge: HOME | End: 2022-12-12
Admitting: PHYSICIAN ASSISTANT

## 2022-12-12 DIAGNOSIS — N20.1 LEFT URETERAL STONE: ICD-10-CM

## 2022-12-12 PROCEDURE — 74018 RADEX ABDOMEN 1 VIEW: CPT

## 2022-12-13 ENCOUNTER — OFFICE VISIT (OUTPATIENT)
Dept: UROLOGY | Facility: CLINIC | Age: 52
End: 2022-12-13

## 2022-12-13 VITALS — HEIGHT: 74 IN | BODY MASS INDEX: 40.43 KG/M2 | WEIGHT: 315 LBS | TEMPERATURE: 96.8 F

## 2022-12-13 DIAGNOSIS — N20.0 KIDNEY STONES: ICD-10-CM

## 2022-12-13 DIAGNOSIS — R10.9 BILATERAL FLANK PAIN: Primary | ICD-10-CM

## 2022-12-13 LAB
BILIRUB BLD-MCNC: NEGATIVE MG/DL
CLARITY, POC: CLEAR
COLOR UR: YELLOW
GLUCOSE UR STRIP-MCNC: NEGATIVE MG/DL
KETONES UR QL: NEGATIVE
LEUKOCYTE EST, POC: NEGATIVE
NITRITE UR-MCNC: NEGATIVE MG/ML
PH UR: 6 [PH] (ref 5–8)
PROT UR STRIP-MCNC: ABNORMAL MG/DL
RBC # UR STRIP: NEGATIVE /UL
SP GR UR: 1.03 (ref 1–1.03)
UROBILINOGEN UR QL: ABNORMAL

## 2022-12-13 PROCEDURE — 81001 URINALYSIS AUTO W/SCOPE: CPT | Performed by: PHYSICIAN ASSISTANT

## 2022-12-13 PROCEDURE — 99214 OFFICE O/P EST MOD 30 MIN: CPT | Performed by: PHYSICIAN ASSISTANT

## 2023-01-04 ENCOUNTER — HOSPITAL ENCOUNTER (EMERGENCY)
Facility: HOSPITAL | Age: 53
Discharge: HOME | End: 2023-01-04
Attending: STUDENT IN AN ORGANIZED HEALTH CARE EDUCATION/TRAINING PROGRAM | Admitting: STUDENT IN AN ORGANIZED HEALTH CARE EDUCATION/TRAINING PROGRAM
Payer: OTHER GOVERNMENT

## 2023-01-04 ENCOUNTER — APPOINTMENT (OUTPATIENT)
Dept: ULTRASOUND IMAGING | Facility: HOSPITAL | Age: 53
End: 2023-01-04
Payer: OTHER GOVERNMENT

## 2023-01-04 VITALS
OXYGEN SATURATION: 95 % | DIASTOLIC BLOOD PRESSURE: 98 MMHG | HEIGHT: 74 IN | RESPIRATION RATE: 16 BRPM | SYSTOLIC BLOOD PRESSURE: 159 MMHG | TEMPERATURE: 97.4 F | WEIGHT: 315 LBS | HEART RATE: 78 BPM | BODY MASS INDEX: 40.43 KG/M2

## 2023-01-04 DIAGNOSIS — M79.89 LEFT LEG SWELLING: Primary | ICD-10-CM

## 2023-01-04 DIAGNOSIS — I87.2 VENOUS STASIS DERMATITIS OF BOTH LOWER EXTREMITIES: ICD-10-CM

## 2023-01-04 DIAGNOSIS — R60.9 PITTING EDEMA: ICD-10-CM

## 2023-01-04 LAB
ALBUMIN SERPL-MCNC: 4.6 G/DL (ref 3.5–5.2)
ALBUMIN/GLOB SERPL: 1.2 G/DL
ALP SERPL-CCNC: 119 U/L (ref 39–117)
ALT SERPL W P-5'-P-CCNC: 75 U/L (ref 1–41)
ANION GAP SERPL CALCULATED.3IONS-SCNC: 13 MMOL/L (ref 5–15)
AST SERPL-CCNC: 44 U/L (ref 1–40)
BASOPHILS # BLD AUTO: 0.05 10*3/MM3 (ref 0–0.2)
BASOPHILS NFR BLD AUTO: 0.6 % (ref 0–1.5)
BILIRUB SERPL-MCNC: 0.3 MG/DL (ref 0–1.2)
BUN SERPL-MCNC: 16 MG/DL (ref 6–20)
BUN/CREAT SERPL: 21.1 (ref 7–25)
CALCIUM SPEC-SCNC: 10 MG/DL (ref 8.6–10.5)
CHLORIDE SERPL-SCNC: 103 MMOL/L (ref 98–107)
CO2 SERPL-SCNC: 25 MMOL/L (ref 22–29)
CREAT SERPL-MCNC: 0.76 MG/DL (ref 0.76–1.27)
DEPRECATED RDW RBC AUTO: 50.4 FL (ref 37–54)
EGFRCR SERPLBLD CKD-EPI 2021: 108.8 ML/MIN/1.73
EOSINOPHIL # BLD AUTO: 0.2 10*3/MM3 (ref 0–0.4)
EOSINOPHIL NFR BLD AUTO: 2.6 % (ref 0.3–6.2)
ERYTHROCYTE [DISTWIDTH] IN BLOOD BY AUTOMATED COUNT: 15.7 % (ref 12.3–15.4)
GLOBULIN UR ELPH-MCNC: 3.7 GM/DL
GLUCOSE SERPL-MCNC: 97 MG/DL (ref 65–99)
HCT VFR BLD AUTO: 49.4 % (ref 37.5–51)
HGB BLD-MCNC: 15.4 G/DL (ref 13–17.7)
IMM GRANULOCYTES # BLD AUTO: 0.02 10*3/MM3 (ref 0–0.05)
IMM GRANULOCYTES NFR BLD AUTO: 0.3 % (ref 0–0.5)
LYMPHOCYTES # BLD AUTO: 2.08 10*3/MM3 (ref 0.7–3.1)
LYMPHOCYTES NFR BLD AUTO: 26.9 % (ref 19.6–45.3)
MAGNESIUM SERPL-MCNC: 1.9 MG/DL (ref 1.6–2.6)
MCH RBC QN AUTO: 27.4 PG (ref 26.6–33)
MCHC RBC AUTO-ENTMCNC: 31.2 G/DL (ref 31.5–35.7)
MCV RBC AUTO: 87.9 FL (ref 79–97)
MONOCYTES # BLD AUTO: 0.61 10*3/MM3 (ref 0.1–0.9)
MONOCYTES NFR BLD AUTO: 7.9 % (ref 5–12)
NEUTROPHILS NFR BLD AUTO: 4.78 10*3/MM3 (ref 1.7–7)
NEUTROPHILS NFR BLD AUTO: 61.7 % (ref 42.7–76)
NRBC BLD AUTO-RTO: 0 /100 WBC (ref 0–0.2)
NT-PROBNP SERPL-MCNC: <36 PG/ML (ref 0–900)
PLATELET # BLD AUTO: 200 10*3/MM3 (ref 140–450)
PMV BLD AUTO: 10.1 FL (ref 6–12)
POTASSIUM SERPL-SCNC: 4.4 MMOL/L (ref 3.5–5.2)
PROT SERPL-MCNC: 8.3 G/DL (ref 6–8.5)
RBC # BLD AUTO: 5.62 10*6/MM3 (ref 4.14–5.8)
SODIUM SERPL-SCNC: 141 MMOL/L (ref 136–145)
WBC NRBC COR # BLD: 7.74 10*3/MM3 (ref 3.4–10.8)

## 2023-01-04 PROCEDURE — 83880 ASSAY OF NATRIURETIC PEPTIDE: CPT | Performed by: STUDENT IN AN ORGANIZED HEALTH CARE EDUCATION/TRAINING PROGRAM

## 2023-01-04 PROCEDURE — 93971 EXTREMITY STUDY: CPT | Performed by: SURGERY

## 2023-01-04 PROCEDURE — 83735 ASSAY OF MAGNESIUM: CPT | Performed by: STUDENT IN AN ORGANIZED HEALTH CARE EDUCATION/TRAINING PROGRAM

## 2023-01-04 PROCEDURE — 85025 COMPLETE CBC W/AUTO DIFF WBC: CPT | Performed by: STUDENT IN AN ORGANIZED HEALTH CARE EDUCATION/TRAINING PROGRAM

## 2023-01-04 PROCEDURE — 80053 COMPREHEN METABOLIC PANEL: CPT | Performed by: STUDENT IN AN ORGANIZED HEALTH CARE EDUCATION/TRAINING PROGRAM

## 2023-01-04 PROCEDURE — 93971 EXTREMITY STUDY: CPT

## 2023-01-04 PROCEDURE — 36415 COLL VENOUS BLD VENIPUNCTURE: CPT

## 2023-01-04 PROCEDURE — 99283 EMERGENCY DEPT VISIT LOW MDM: CPT

## 2023-01-04 RX ORDER — SODIUM CHLORIDE 0.9 % (FLUSH) 0.9 %
10 SYRINGE (ML) INJECTION AS NEEDED
Status: DISCONTINUED | OUTPATIENT
Start: 2023-01-04 | End: 2023-01-04 | Stop reason: HOSPADM

## 2023-01-04 NOTE — Clinical Note
[facility] EMERGENCY DEPARTMENT  [facility] [facility] [address]  Phone: [phone]    Randell Reddy was seen and treated in our emergency department on 1/4/2023.  He may return to work on 01/06/2023.         Thank you for choosing [facility].    Alexis Richards MD

## 2023-01-05 NOTE — DISCHARGE INSTRUCTIONS
Today you are seen for your symptoms.  Your lab work was reassuring.  Your ultrasound will be read again by radiologist if there are any changes will receive a call from a provider but does not appear to have a DVT at this time.  I discussed the most common cause of her symptoms is venous stasis I recommend wearing compression stockings particular if you are on your feet frequently.  Please call your primary care provider schedule close follow-up appointment within 2 days.  I want you to return to the emergency department immediately if your symptoms worsen.

## 2023-01-05 NOTE — ED PROVIDER NOTES
EMERGENCY DEPARTMENT ATTENDING NOTE    Patient Name: Randell Reddy    Chief Complaint   Patient presents with   • Leg Swelling       PATIENT PRESENTATION:  Randell Reddy is a 52 y.o. male with history of high blood pressure presents to the emergency department due to left lower extremity swelling.    Patient states about 6 weeks ago he noticed some swelling mostly to his left lower extremity particularly his foot but he went to put on socks and shoes for 6 progressively worsened over the last 6 weeks ultimately showed his wife for her extreme concern and he now presents to the emergency department.  Recently started on a blood pressure medication otherwise no acute changes in medications.  He does notice that it is worse at night and looks best when he wakes up.  He states both legs are slightly swollen with left Greater than right.  No history of blood clots.  Denies any shortness of breath.  No history of heart failure or kidney problems that he is aware of.  He just has just had kidney stones in the past.  No associated fever chills or chest pain or shortness of breath nausea vomiting or abdominal pain.      PHYSICAL EXAM:   VS: /93 (BP Location: Left arm, Patient Position: Sitting)   Pulse 78   Temp 97.4 °F (36.3 °C) (Oral)   Resp 18   Ht 188 cm (74\")   Wt (!) 171 kg (378 lb)   SpO2 100%   BMI 48.53 kg/m²   GENERAL: Well-appearing middle-age man sitting up in stretcher no acute distress well-nourished, well-developed, awake, alert, no acute distress, nontoxic appearing, comfortable  RESPIRATORY: Unlabored respiratory effort, clear to auscultation bilaterally, good air movement  CARDIOVASCULAR: No murmurs or gallops, peripheral pulses 2+ and equal in all extremities  GI: Soft, nontender, nondistended, bowel sounds present, no hepatosplenomegaly  LYMPHATIC: no lymphadenopathy  MUSCULOSKELETAL/EXTREMITIES: Left lower extremity pitting edema about 2+ to the knees; trace pitting edema right  lower extremity; otherwise extremities without obvious deformity, no cyanosis or clubbing  SKIN: Skin darkening of the bilateral anterior shins are seen in the photo below; warm and dry with no obvious rashes    PSYCHIATRIC: alert, pleasant and cooperative. Appropriate mood and affect.      MEDICAL DECISION MAKING:    Randell Reddy is a 52 y.o. male who presents emergency department due to left lower extremity swelling that has been worsening over last 6 weeks.    Differential Diagnosis Considered: Deep venous thrombosis, venous stasis dermatitis, venous stasis disease, heart failure, kidney dysfunction    Labs Ordered:  Labs Reviewed   COMPREHENSIVE METABOLIC PANEL - Abnormal; Notable for the following components:       Result Value    ALT (SGPT) 75 (*)     AST (SGOT) 44 (*)     Alkaline Phosphatase 119 (*)     All other components within normal limits    Narrative:     GFR Normal >60  Chronic Kidney Disease <60  Kidney Failure <15     CBC WITH AUTO DIFFERENTIAL - Abnormal; Notable for the following components:    MCHC 31.2 (*)     RDW 15.7 (*)     All other components within normal limits   MAGNESIUM - Normal   BNP (IN-HOUSE) - Normal    Narrative:     Among patients with dyspnea, NT-proBNP is highly sensitive for the detection of acute congestive heart failure. In addition NT-proBNP of <300 pg/ml effectively rules out acute congestive heart failure with 99% negative predictive value.     CBC AND DIFFERENTIAL    Narrative:     The following orders were created for panel order CBC & Differential.  Procedure                               Abnormality         Status                     ---------                               -----------         ------                     CBC Auto Differential[[id number]]        Abnormal            Final result                 Please view results for these tests on the individual orders.        Imaging Ordered:   US Venous Doppler Lower Extremity Left (duplex)    (Results Pending)          My lab interpretation: Normal kidney function no evidence of heart failure.  Otherwise reassuring labs.    My imaging interpretation: No evidence of DVT of the left lower extremity.    ED Course and Re-evaluation: Patient presents with 6 weeks of worsening left lower extremity swelling given your the lateral nature concerning for possible deep venous thrombosis.  However appears to be developing venous stasis dermatitis of the bilateral extremities so felt that venous stasis is most likely as well.  No evidence of heart failure or kidney dysfunction to explain volume overload patient otherwise with no shortness of breath.      ED Diagnosis:  Left leg swelling; Pitting edema; Venous stasis dermatitis of both lower extremities     Disposition: to home  Follow up plan: PCP follow up within 2 days, return to ED immediately if symptoms worsen        Signed:  Alexis Richards MD  Emergency Medicine Physician    Please note that portions of this note were completed with a voice recognition program.      Alexis Richards MD  01/04/23 [id number]

## 2023-03-13 NOTE — PROGRESS NOTES
Subjective    Mr. Reddy is 52 y.o. male    Chief Complaint: Bilateral Flank Pain    History of Present Illness  Patient is a 52-year-old gentleman who presents with history of kidney stones for 6-month follow-up with KUB prior.  He has had no acute episodes requiring him to go to the emergency department but he has had intermittent pain which comes and goes in his right flank.  His urine is clear today KUB showed no obvious calcifications to indicate increasing stone size.  He has not had any gross hematuria or urinary tract infections.  He drinks almost primarily water and is now using a lime supplement to increase urinary citrate level.    The following portions of the patient's history were reviewed and updated as appropriate: allergies, current medications, past family history, past medical history, past social history, past surgical history and problem list.    Review of Systems   Gastrointestinal: Negative.    Genitourinary: Positive for flank pain. Negative for difficulty urinating and hematuria.   All other systems reviewed and are negative.        Current Outpatient Medications:   •  acetaminophen (TYLENOL) 325 MG tablet, Take 2 tablets by mouth Every 4 (Four) Hours As Needed for Mild Pain ., Disp: , Rfl:   •  ascorbic acid (VITAMIN C) 1000 MG tablet, Take 1 tablet by mouth Daily., Disp: , Rfl:   •  EPINEPHrine (EPIPEN) 0.3 MG/0.3ML solution auto-injector injection, Inject 1 syringe into the appropriate muscle as directed by prescriber., Disp: , Rfl:   •  folic acid (FOLVITE) 800 MCG tablet, Take 1 tablet by mouth Daily., Disp: , Rfl:   •  Glucose-Vitamin C-Vitamin D (TRUEplus Glucose) chewable tablet, Chew 1 tablet Daily As Needed for Low Blood Sugar., Disp: , Rfl:   •  losartan-hydrochlorothiazide (HYZAAR) 50-12.5 MG per tablet, , Disp: , Rfl:   •  metFORMIN ER (GLUCOPHAGE-XR) 500 MG 24 hr tablet, Take 1 tablet by mouth Daily With Breakfast., Disp: , Rfl:   •  oxyCODONE (OXY-IR) 5 MG capsule, Take 1  capsule by mouth Every 4 (Four) Hours As Needed for Moderate Pain., Disp: , Rfl:   •  Ozempic, 0.25 or 0.5 MG/DOSE, 2 MG/1.5ML solution pen-injector, INJECT 0.5 MG SUBCUTANEOUSLY ONCE WEEKLY., Disp: , Rfl:   •  polyethylene glycol (MIRALAX) 17 GM/SCOOP powder, , Disp: , Rfl:   •  PROAIR  (90 Base) MCG/ACT inhaler, Inhale 1 puff Every 4 (Four) Hours As Needed for Wheezing or Shortness of Air., Disp: , Rfl:   •  Qsymia 7.5-46 MG capsule sustained-release 24 hr, , Disp: , Rfl:   •  tamsulosin (FLOMAX) 0.4 MG capsule 24 hr capsule, Take 1 capsule by mouth Daily. For kidney stone expulsion, Disp: 30 capsule, Rfl: 1  •  zinc gluconate 50 MG tablet, Take 1 tablet by mouth Daily., Disp: , Rfl:   •  amLODIPine (NORVASC) 5 MG tablet, Take 1 tablet by mouth Daily. (Patient not taking: Reported on 3/15/2023), Disp: , Rfl:     Past Medical History:   Diagnosis Date   • COVID-19    • Diabetes mellitus (HCC)    • Elevated cholesterol    • Hypertension    • Injury of back    • Kidney stone    • Sleep apnea     cpap       Past Surgical History:   Procedure Laterality Date   • BACK SURGERY      prior discectomies at L3-4 and L4-5 and a laminectomy at L5 and S1 ([facility])times 2   • CYSTOSCOPY, RETROGRADE PYELOGRAM AND STENT INSERTION Left 7/26/2022    Procedure: CYSTOSCOPY RETROGRADE PYELOGRAM AND STENT INSERTION;  Surgeon: Dat Vega MD;  Location: [facility] OR;  Service: Urology;  Laterality: Left;   • EXTRACORPOREAL SHOCK WAVE LITHOTRIPSY (ESWL)     • EXTRACORPOREAL SHOCKWAVE LITHOTRIPSY (ESWL), STENT INSERTION/REMOVAL Left 7/1/2022    Procedure: EXTRACORPOREAL SHOCKWAVE LITHOTRIPSY LEFT;  Surgeon: Lawson Pulido MD;  Location: [facility] OR;  Service: Urology;  Laterality: Left;   • FRACTURE SURGERY Right     placed plate and one month later removed   • TONSILLECTOMY     • URETEROSCOPY LASER LITHOTRIPSY WITH STENT INSERTION Left 8/8/2022    Procedure: URETEROSCOPY LASER LITHOTRIPSY WITH STENT INSERTION  "LEFT;  Surgeon: Fernando Trinidad MD;  Location: [facility] OR;  Service: Urology;  Laterality: Left;   • VASECTOMY         Social History     Socioeconomic History   • Marital status:    Tobacco Use   • Smoking status: Former     Packs/day: 1.00     Years: 15.00     Pack years: 15.00     Types: Cigarettes     Quit date:      Years since quittin.2   • Smokeless tobacco: Never   Vaping Use   • Vaping Use: Never used   Substance and Sexual Activity   • Alcohol use: No   • Drug use: No   • Sexual activity: Yes     Partners: Female       Family History   Problem Relation Age of Onset   • No Known Problems Father    • No Known Problems Mother        Objective    Temp 98.3 °F (36.8 °C)   Ht 188 cm (74\")   Wt (!) 166 kg (366 lb)   BMI 46.99 kg/m²     Physical Exam  Vitals reviewed.   Constitutional:       General: He is not in acute distress.     Appearance: Normal appearance.   HENT:      Head: Normocephalic and atraumatic.   Pulmonary:      Effort: Pulmonary effort is normal.   Skin:     Coloration: Skin is not pale.   Neurological:      Mental Status: He is alert and oriented to person, place, and time.   Psychiatric:         Mood and Affect: Mood normal.         Behavior: Behavior normal.             Results for orders placed or performed in visit on 03/15/23   POC Urinalysis Dipstick, Multipro    Specimen: Urine   Result Value Ref Range    Color Yellow Yellow, Straw, Dark Yellow, Liya    Clarity, UA Clear Clear    Glucose, UA Negative Negative mg/dL    Bilirubin Negative Negative    Ketones, UA Negative Negative    Specific Gravity  1.025 1.005 - 1.030    Blood, UA Negative Negative    pH, Urine 6.5 5.0 - 8.0    Protein, POC Trace (A) Negative mg/dL    Urobilinogen, UA 0.2 E.U./dL Normal, 0.2 E.U./dL    Nitrite, UA Negative Negative    Leukocytes Negative Negative     KUB independent review    A KUB is available for me to review today.  The image is inspected for a bowel gas pattern and the general bone " structure of the spine and pelvis. The kidneys are then inspected closely.  Renal outline is noted if identifiable. The kidney, collecting system, and anticipated path of the ureter are examined for calcifications including those in the true pelvis.  This film reveals:    On the right there are no calcificaitons seen in the kidney or the expected course of the ureter.     On the left there are no calcificaitons seen in the kidney or the expected course of the ureter.     Assessment and Plan    Diagnoses and all orders for this visit:    1. Kidney stones (Primary)  -     POC Urinalysis Dipstick, Multipro  -     XR Abdomen KUB; Future    Patient with previous history of kidney stones he has had ESWL and ureteroscopy.  KUB today shows no obvious calcifications or indicate kidney stones.  He does have punctate stone seen on previous CT scan but these are not visible on KUB.  He does get occasional right flank pain which will last for several days up to a week and then resolved.  I explained should he have another episode or his pain does not resolve he can return we can get a CT scan.  Otherwise follow-up 6 months KUB prior.

## 2023-03-14 ENCOUNTER — TELEPHONE (OUTPATIENT)
Dept: UROLOGY | Facility: CLINIC | Age: 53
End: 2023-03-14
Payer: COMMERCIAL

## 2023-03-14 NOTE — TELEPHONE ENCOUNTER
Called patient to remind him to get KUB 1 hour prior to appointment. Patient voiced understanding.

## 2023-03-15 ENCOUNTER — OFFICE VISIT (OUTPATIENT)
Dept: UROLOGY | Facility: CLINIC | Age: 53
End: 2023-03-15
Payer: COMMERCIAL

## 2023-03-15 ENCOUNTER — HOSPITAL ENCOUNTER (OUTPATIENT)
Dept: GENERAL RADIOLOGY | Facility: HOSPITAL | Age: 53
Discharge: HOME | End: 2023-03-15
Admitting: PHYSICIAN ASSISTANT
Payer: OTHER GOVERNMENT

## 2023-03-15 VITALS — TEMPERATURE: 98.3 F | BODY MASS INDEX: 40.43 KG/M2 | HEIGHT: 74 IN | WEIGHT: 315 LBS

## 2023-03-15 DIAGNOSIS — N20.0 KIDNEY STONES: ICD-10-CM

## 2023-03-15 DIAGNOSIS — N20.0 KIDNEY STONES: Primary | ICD-10-CM

## 2023-03-15 LAB
BILIRUB BLD-MCNC: NEGATIVE MG/DL
CLARITY, POC: CLEAR
COLOR UR: YELLOW
GLUCOSE UR STRIP-MCNC: NEGATIVE MG/DL
KETONES UR QL: NEGATIVE
LEUKOCYTE EST, POC: NEGATIVE
NITRITE UR-MCNC: NEGATIVE MG/ML
PH UR: 6.5 [PH] (ref 5–8)
PROT UR STRIP-MCNC: ABNORMAL MG/DL
RBC # UR STRIP: NEGATIVE /UL
SP GR UR: 1.02 (ref 1–1.03)
UROBILINOGEN UR QL: ABNORMAL

## 2023-03-15 PROCEDURE — 81001 URINALYSIS AUTO W/SCOPE: CPT | Performed by: PHYSICIAN ASSISTANT

## 2023-03-15 PROCEDURE — 99214 OFFICE O/P EST MOD 30 MIN: CPT | Performed by: PHYSICIAN ASSISTANT

## 2023-03-15 PROCEDURE — 74018 RADEX ABDOMEN 1 VIEW: CPT

## 2023-03-15 RX ORDER — PHENTERMINE AND TOPIRAMATE 7.5; 46 MG/1; MG/1
CAPSULE, EXTENDED RELEASE ORAL
COMMUNITY
Start: 2023-03-06

## 2023-03-15 RX ORDER — POLYETHYLENE GLYCOL 3350 17 G/17G
POWDER, FOR SOLUTION ORAL
COMMUNITY
Start: 2023-03-10

## 2023-03-15 RX ORDER — LOSARTAN POTASSIUM AND HYDROCHLOROTHIAZIDE 12.5; 5 MG/1; MG/1
TABLET ORAL
COMMUNITY
Start: 2022-12-21

## 2023-03-15 RX ORDER — SEMAGLUTIDE 1.34 MG/ML
INJECTION, SOLUTION SUBCUTANEOUS
COMMUNITY
Start: 2023-02-27

## 2023-07-10 PROBLEM — Z12.11 ENCOUNTER FOR SCREENING FOR MALIGNANT NEOPLASM OF COLON: Status: ACTIVE | Noted: 2023-07-10

## 2023-08-20 ENCOUNTER — NURSE TRIAGE (OUTPATIENT)
Dept: CALL CENTER | Facility: HOSPITAL | Age: 53
End: 2023-08-20
Payer: OTHER GOVERNMENT

## 2023-08-21 ENCOUNTER — ANESTHESIA EVENT (OUTPATIENT)
Dept: GASTROENTEROLOGY | Facility: HOSPITAL | Age: 53
End: 2023-08-21
Payer: OTHER GOVERNMENT

## 2023-08-21 ENCOUNTER — HOSPITAL ENCOUNTER (OUTPATIENT)
Facility: HOSPITAL | Age: 53
Setting detail: HOSPITAL OUTPATIENT SURGERY
Discharge: HOME | End: 2023-08-21
Attending: INTERNAL MEDICINE | Admitting: INTERNAL MEDICINE
Payer: OTHER GOVERNMENT

## 2023-08-21 ENCOUNTER — ANESTHESIA (OUTPATIENT)
Dept: GASTROENTEROLOGY | Facility: HOSPITAL | Age: 53
End: 2023-08-21
Payer: OTHER GOVERNMENT

## 2023-08-21 VITALS
OXYGEN SATURATION: 97 % | DIASTOLIC BLOOD PRESSURE: 90 MMHG | TEMPERATURE: 96.8 F | RESPIRATION RATE: 16 BRPM | BODY MASS INDEX: 40.43 KG/M2 | WEIGHT: 315 LBS | HEIGHT: 74 IN | HEART RATE: 72 BPM | SYSTOLIC BLOOD PRESSURE: 138 MMHG

## 2023-08-21 DIAGNOSIS — Z12.11 ENCOUNTER FOR SCREENING FOR MALIGNANT NEOPLASM OF COLON: ICD-10-CM

## 2023-08-21 PROCEDURE — 45385 COLONOSCOPY W/LESION REMOVAL: CPT | Performed by: INTERNAL MEDICINE

## 2023-08-21 PROCEDURE — 88305 TISSUE EXAM BY PATHOLOGIST: CPT | Performed by: INTERNAL MEDICINE

## 2023-08-21 PROCEDURE — 25010000002 PROPOFOL 10 MG/ML EMULSION: Performed by: NURSE ANESTHETIST, CERTIFIED REGISTERED

## 2023-08-21 DEVICE — DEV CLIP ENDO RESOLUTION360 CONTRL ROT 235CM: Type: IMPLANTABLE DEVICE | Site: TRANSVERSE COLON | Status: FUNCTIONAL

## 2023-08-21 RX ORDER — LIDOCAINE HYDROCHLORIDE 10 MG/ML
0.5 INJECTION, SOLUTION EPIDURAL; INFILTRATION; INTRACAUDAL; PERINEURAL ONCE AS NEEDED
Status: DISCONTINUED | OUTPATIENT
Start: 2023-08-21 | End: 2023-08-21 | Stop reason: HOSPADM

## 2023-08-21 RX ORDER — ONDANSETRON 2 MG/ML
4 INJECTION INTRAMUSCULAR; INTRAVENOUS ONCE AS NEEDED
Status: DISCONTINUED | OUTPATIENT
Start: 2023-08-21 | End: 2023-08-21 | Stop reason: HOSPADM

## 2023-08-21 RX ORDER — SODIUM CHLORIDE 0.9 % (FLUSH) 0.9 %
10 SYRINGE (ML) INJECTION AS NEEDED
Status: DISCONTINUED | OUTPATIENT
Start: 2023-08-21 | End: 2023-08-21 | Stop reason: HOSPADM

## 2023-08-21 RX ORDER — LIDOCAINE HYDROCHLORIDE 20 MG/ML
INJECTION, SOLUTION EPIDURAL; INFILTRATION; INTRACAUDAL; PERINEURAL AS NEEDED
Status: DISCONTINUED | OUTPATIENT
Start: 2023-08-21 | End: 2023-08-21 | Stop reason: SURG

## 2023-08-21 RX ORDER — PROPOFOL 10 MG/ML
VIAL (ML) INTRAVENOUS AS NEEDED
Status: DISCONTINUED | OUTPATIENT
Start: 2023-08-21 | End: 2023-08-21 | Stop reason: SURG

## 2023-08-21 RX ORDER — SODIUM CHLORIDE 9 MG/ML
500 INJECTION, SOLUTION INTRAVENOUS CONTINUOUS PRN
Status: DISCONTINUED | OUTPATIENT
Start: 2023-08-21 | End: 2023-08-21 | Stop reason: HOSPADM

## 2023-08-21 RX ADMIN — PROPOFOL INJECTABLE EMULSION 100 MG: 10 INJECTION, EMULSION INTRAVENOUS at 12:55

## 2023-08-21 RX ADMIN — PROPOFOL INJECTABLE EMULSION 100 MG: 10 INJECTION, EMULSION INTRAVENOUS at 12:45

## 2023-08-21 RX ADMIN — PROPOFOL INJECTABLE EMULSION 50 MG: 10 INJECTION, EMULSION INTRAVENOUS at 13:07

## 2023-08-21 RX ADMIN — PROPOFOL INJECTABLE EMULSION 100 MG: 10 INJECTION, EMULSION INTRAVENOUS at 13:02

## 2023-08-21 RX ADMIN — SODIUM CHLORIDE 500 ML: 9 INJECTION, SOLUTION INTRAVENOUS at 11:40

## 2023-08-21 RX ADMIN — LIDOCAINE HYDROCHLORIDE 100 MG: 20 INJECTION, SOLUTION EPIDURAL; INFILTRATION; INTRACAUDAL; PERINEURAL at 12:45

## 2023-08-21 NOTE — H&P
[facility] Gastroenterology  Pre Procedure History & Physical    Chief Complaint:   Screening    Subjective     HPI:   Screening    Past Medical History:   Past Medical History:   Diagnosis Date    Abnormal LFTs     Bipolar disorder     COVID-19     Diabetes mellitus     Dysphagia     Elevated cholesterol     Hypertension     Injury of back     Kidney stone     Sleep apnea     cpap       Past Surgical History:  Past Surgical History:   Procedure Laterality Date    BACK SURGERY      prior discectomies at L3-4 and L4-5 and a laminectomy at L5 and S1 ([facility] and [facility])times 2    CYSTOSCOPY, RETROGRADE PYELOGRAM AND STENT INSERTION Left 07/26/2022    Procedure: CYSTOSCOPY RETROGRADE PYELOGRAM AND STENT INSERTION;  Surgeon: Dat Vega MD;  Location: [facility] OR;  Service: Urology;  Laterality: Left;    EXTRACORPOREAL SHOCK WAVE LITHOTRIPSY (ESWL)      EXTRACORPOREAL SHOCKWAVE LITHOTRIPSY (ESWL), STENT INSERTION/REMOVAL Left 07/01/2022    Procedure: EXTRACORPOREAL SHOCKWAVE LITHOTRIPSY LEFT;  Surgeon: Lawson Pulido MD;  Location:  [facility] OR;  Service: Urology;  Laterality: Left;    FRACTURE SURGERY Right     placed plate and one month later removed, foot    TONSILLECTOMY      URETEROSCOPY LASER LITHOTRIPSY WITH STENT INSERTION Left 08/08/2022    Procedure: URETEROSCOPY LASER LITHOTRIPSY WITH STENT INSERTION LEFT;  Surgeon: Fernando Trinidad MD;  Location:  [facility] OR;  Service: Urology;  Laterality: Left;    VASECTOMY         Family History:  Family History   Problem Relation Age of Onset    No Known Problems Mother     Colon polyps Father     Colon cancer Neg Hx        Social History:   reports that he quit smoking about 8 years ago. His smoking use included cigarettes. He has a 15.00 pack-year smoking history. He has never used smokeless tobacco. He reports that he does not drink alcohol and does not use drugs.    Medications:   Prior to Admission medications    Medication Sig Start Date End Date  Taking? Authorizing Provider   acetaminophen (TYLENOL) 325 MG tablet Take 2 tablets by mouth Every 4 (Four) Hours As Needed for Mild Pain . 7/27/22  Yes Siomara Pulido APRN   ibuprofen (ADVIL,MOTRIN) 800 MG tablet Take 1 tablet by mouth Every 6 (Six) Hours As Needed for Mild Pain.   Yes Tianna Goodrich MD   oxyCODONE (OXY-IR) 5 MG capsule Take 1 capsule by mouth Every 4 (Four) Hours As Needed for Moderate Pain.   Yes Tianna Goodrich MD   ascorbic acid (VITAMIN C) 1000 MG tablet Take 1 tablet by mouth Daily.    Tianna Goodrich MD   EPINEPHrine (EPIPEN) 0.3 MG/0.3ML solution auto-injector injection Inject 1 syringe into the appropriate muscle as directed by prescriber.    Tianna Goodrich MD   folic acid (FOLVITE) 800 MCG tablet Take 1 tablet by mouth Daily.    Tianna Goodrich MD   glucose 4-6 GM-MG per chewable tablet Chew 2 tablets As Needed for Low Blood Sugar.    Tianna Goodrich MD   Glucose-Vitamin C-Vitamin D (TRUEplus Glucose) chewable tablet Chew 1 tablet Daily As Needed for Low Blood Sugar. 3/24/22   Tianna Goodrich MD   losartan-hydrochlorothiazide (HYZAAR) 50-12.5 MG per tablet  12/21/22   Tianna Goodrich MD   metFORMIN ER (GLUCOPHAGE-XR) 500 MG 24 hr tablet Take 1 tablet by mouth Daily With Breakfast. 1/22/21   Tianna Goodrich MD Mounjaro 2.5 MG/0.5ML solution pen-injector INJECT 2.5 MG SUB-Q EVERY WEEK AS DIRECTED FOR 28 DAYS 6/16/23   Tianna Goodrich MD   naloxone (NARCAN) 4 MG/0.1ML nasal spray 1 spray into the nostril(s) as directed by provider As Needed.    Tianna Goodrich MD   polyethylene glycol (MIRALAX) 17 GM/SCOOP powder  3/10/23   Tianna Goodrich MD   Polyethylene Glycol powder Daily As Needed.    Tianna Goodrich MD   PROAIR  (90 Base) MCG/ACT inhaler Inhale 1 puff Every 4 (Four) Hours As Needed for Wheezing or Shortness of Air. 9/20/18   Tianna Goodrich MD   Qsymia 7.5-46 MG capsule sustained-release  "24 hr  3/6/23   ProviderTianna MD   tamsulosin (FLOMAX) 0.4 MG capsule 24 hr capsule Take 1 capsule by mouth Daily. For kidney stone expulsion 7/1/22   Lawson Pulido MD   zinc gluconate 50 MG tablet Take 1 tablet by mouth Daily.    Tianna Goodrich MD   amLODIPine (NORVASC) 5 MG tablet Take 1 tablet by mouth Daily. 3/25/22 8/21/23  Tianna Goodrich MD       Allergies:  Bee venom, Cardizem [diltiazem], Crestor [rosuvastatin], Lisinopril, and Zetia [ezetimibe]    ROS:    General: Weight stable  Resp: No SOA  Cardiovascular: No CP    Objective     Blood pressure 138/90, pulse 72, temperature 96.8 øF (36 øC), temperature source Temporal, resp. rate 16, height 186.7 cm (73.5\"), weight (!) 159 kg (351 lb), SpO2 97 %.    Physical Exam   Constitutional: Pt is oriented to person, place, and in no distress.   Cardiovascular: Normal rate, regular rhythm.    Pulmonary/Chest: Effort normal. No respiratory distress.   Abdominal: Non-distended.  Psychiatric: Mood, memory, affect and judgment appear normal.     Assessment & Plan     Diagnosis:  Screening    Anticipated Surgical Procedure:  Colonoscopy    The risks, benefits, and alternatives of this procedure have been discussed with the patient or the responsible party- the patient understands and agrees to proceed.    EMR Dragon/transcription disclaimer:  Much of this encounter note is electronic transcription/translation of spoken language to printed text.  The electronic translation of spoken language may be erroneous, or at times, nonsensical words or phrases may be inadvertently transcribed.  Although I have reviewed the note for such errors, some may still exist.  "

## 2023-08-21 NOTE — ANESTHESIA POSTPROCEDURE EVALUATION
"Patient: Randell Reddy    Procedure Summary       Date: 08/21/23 Room / Location: [facility] ENDOSCOPY 5 / [facility]    Anesthesia Start: 1238 Anesthesia Stop: 1310    Procedure: COLONOSCOPY WITH ANESTHESIA Diagnosis:       Encounter for screening for malignant neoplasm of colon      (Encounter for screening for malignant neoplasm of colon [Z12.11])    Surgeons: Jarred De Oliveira MD Provider: David Medrano CRNA    Anesthesia Type: MAC ASA Status: 3            Anesthesia Type: MAC    Vitals  Vitals Value Taken Time   /83 08/21/23 1316   Temp     Pulse 71 08/21/23 1317   Resp 20 08/21/23 1313   SpO2 93 % 08/21/23 1317   Vitals shown include unvalidated device data.        Post Anesthesia Care and Evaluation    Patient location during evaluation: PHASE II  Patient participation: complete - patient participated  Level of consciousness: awake  Pain score: 0  Pain management: adequate    Airway patency: patent  Anesthetic complications: No anesthetic complications  PONV Status: none  Cardiovascular status: acceptable  Respiratory status: acceptable  Hydration status: acceptable    Comments: Blood pressure 124/85, pulse 74, temperature 96.8 øF (36 øC), temperature source Temporal, resp. rate 20, height 186.7 cm (73.5\"), weight (!) 159 kg (351 lb), SpO2 94 %.      "

## 2023-08-21 NOTE — ANESTHESIA PREPROCEDURE EVALUATION
Anesthesia Evaluation     Patient summary reviewed   no history of anesthetic complications:   NPO Solid Status: > 8 hours             Airway   Mallampati: II  Dental      Pulmonary    (+) ,sleep apnea on CPAP  Cardiovascular   Exercise tolerance: excellent (>7 METS)    (+) hypertensionhyperlipidemia      Neuro/Psych- negative ROS  GI/Hepatic/Renal/Endo    (+) morbid obesity, diabetes mellitus    Musculoskeletal     Abdominal    Substance History      OB/GYN          Other                      Anesthesia Plan    ASA 3     MAC     intravenous induction     Anesthetic plan, risks, benefits, and alternatives have been provided, discussed and informed consent has been obtained with: patient.    CODE STATUS:

## 2023-08-21 NOTE — TELEPHONE ENCOUNTER
Caller reported started bowel prep yesterday, took the 2 bisacodyl tabs, has been on clear liquids today, drank half the gatorade with miralax as instructed today and has taken the other 2 bisacodyl tabs. Reported is supposed to drink rest of miralax and gatorade in morning. Concerned about prep because is still having formed stool. Instructed to continue prep as instructed and drink plenty of water to help with efficiency. May call back in morning if still having formed stools.  Reason for Disposition   Bowel prep for colonoscopy, questions about    Additional Information   Negative: Shock suspected (e.g., cold/pale/clammy skin, too weak to stand, low BP, rapid pulse)   Negative: Difficult to awaken or acting confused (e.g., disoriented, slurred speech)   Negative: Passed out (i.e., lost consciousness, collapsed and was not responding)   Negative: Sounds like a life-threatening emergency to the triager   Negative: Breathing difficulty   Negative: Chest pain   Negative: [1] Abdomen pain is main concern AND [2] started > 3 days (72 hours) after colonoscopy AND [3] Female   Negative: [1] Abdomen pain is main concern AND [2] started > 3 days (72 hours) after colonoscopy AND [3] male   Negative: [1] Vomiting is main concern AND [2] started > 3 days after colonoscopy   Negative: SEVERE abdomen pain (e.g., excruciating)   Negative: SEVERE rectal bleeding (large blood clots; on and off, or constant bleeding)   Negative: SEVERE dizziness (e.g., unable to stand, requires support to walk, feels like passing out now)   Negative: SEVERE vomiting (e.g., 6 or more times/day)   Negative: [1] MODERATE rectal bleeding (small blood clots, passing blood without stool, or toilet water turns red) AND [2] more than once   Negative: [1] MILD-MODERATE abdomen pain AND [2] constant AND [3] present > 2 hours   Negative: [1] Drinking very little AND [2] dehydration suspected (e.g., no urine > 12 hours, very dry mouth, very lightheaded)    "Negative: Patient sounds very sick or weak to the triager   Negative: Fever > 100.4 F (38.0 C)   Negative: [1] Abdominal bloating, cramping, nausea, or vomiting while drinking bowel prep AND [2] CANNOT finish bowel prep AND [3] has tried recommended Care Advice   Negative: [1] Caller has URGENT question or concern AND [2] triager unable to answer question   Negative: [1] MILD-MODERATE abdomen pain (e.g., does not interfere with normal activities) AND [2] pain comes and goes (cramps) [3] lasting > 48 hours   Negative: [1] MILD to MODERATE vomiting (e.g., 1 to 5 times a day) AND [2] lasting > 24 hours   Negative: Any rectal bleeding occurring > 24 hours after colonoscopy   Negative: [1] Caller has NON-URGENT question AND [2] triager unable to answer question   Negative: Abdominal cramping and bloating after colonoscopy, questions about   Negative: Rectal bleeding (slight; streaks or less than 1 teaspoon or 5 ml) after colonoscopy, questions about   Negative: Passing gas (flatus) after colonoscopy, questions about   Negative: Abdominal bloating, cramping, nausea, or vomiting while drinking bowel prep, questions about    Answer Assessment - Initial Assessment Questions  1. DATE/TIME: \"When did you have your colonoscopy?\"       Having tomorrow  2. MAIN CONCERN: \"What is your main concern right now?\" \"What questions do you have?\"      Stools are still formed, concerned prep is not being effective and won't be cleaned out in time for procedure  3. ABDOMEN PAIN: \"Are you having any abdomen (belly or stomach) pain?\" If Yes, ask: \"How bad is it?\" (e.g., Scale 1-10; mild, moderate, severe).     - MILD (1-3): doesn't interfere with normal activities, abdomen soft and not tender to touch      - MODERATE (4-7): interferes with normal activities or awakens from sleep, abdomen tender to touch      - SEVERE (8-10): excruciating pain, doubled over, unable to do any normal activities        N/a  4. OTHER SYMPTOMS: \"What other symptoms " "are you having?\" (e.g., rectal bleeding, bloating or feeling gassy, passing gas, vomiting, dizziness, fever).      N/a  5. ONSET: \"When did your symptoms start?\"      N/a  6. PATTERN: \"Is the symptom(s) constant or does it come and go?\" \"Is your symptom(s) getting worse, better, or staying the same?\"      N/a    Protocols used: Colonoscopy Symptoms and Questions-ADULT-    "

## 2023-08-21 NOTE — DISCHARGE INSTRUCTIONS
You have had a clip placed in your colon to help prevent bleeding. It will come off on its own within 2 weeks. If you see it, flush it. DO NOT RETRIEVE IT. No MRI for 2 weeks unless approved by your physician. Keep your clip card for 2 weeks.

## 2023-08-22 LAB
CYTO UR: NORMAL
LAB AP CASE REPORT: NORMAL
Lab: NORMAL
PATH REPORT.FINAL DX SPEC: NORMAL
PATH REPORT.GROSS SPEC: NORMAL

## 2023-09-05 NOTE — PROGRESS NOTES
Subjective    Mr. Reddy is 52 y.o. male    Chief Complaint: Bilateral Flank Pain    History of Present Illness  Patient presents for 6-month follow-up with KUB with history of kidney stones.  He will have occasional pain on both the left and right flank.  No acute pain no need to visit the ER since he was last seen.  He states he has passed 1 stone since he was seen.  No urinary tract infections or gross hematuria.  He got a KUB prior to his appointment today.    The following portions of the patient's history were reviewed and updated as appropriate: allergies, current medications, past family history, past medical history, past social history, past surgical history and problem list.    Review of Systems   Genitourinary: Negative.        Current Outpatient Medications:     acetaminophen (TYLENOL) 325 MG tablet, Take 2 tablets by mouth Every 4 (Four) Hours As Needed for Mild Pain ., Disp: , Rfl:     ascorbic acid (VITAMIN C) 1000 MG tablet, Take 1 tablet by mouth Daily., Disp: , Rfl:     EPINEPHrine (EPIPEN) 0.3 MG/0.3ML solution auto-injector injection, Inject 1 syringe into the appropriate muscle as directed by prescriber., Disp: , Rfl:     folic acid (FOLVITE) 800 MCG tablet, Take 1 tablet by mouth Daily., Disp: , Rfl:     glucose 4-6 GM-MG per chewable tablet, Chew 2 tablets As Needed for Low Blood Sugar., Disp: , Rfl:     Glucose-Vitamin C-Vitamin D (TRUEplus Glucose) chewable tablet, Chew 1 tablet Daily As Needed for Low Blood Sugar., Disp: , Rfl:     ibuprofen (ADVIL,MOTRIN) 800 MG tablet, Take 1 tablet by mouth Every 6 (Six) Hours As Needed for Mild Pain., Disp: , Rfl:     losartan-hydrochlorothiazide (HYZAAR) 50-12.5 MG per tablet, , Disp: , Rfl:     metFORMIN ER (GLUCOPHAGE-XR) 500 MG 24 hr tablet, Take 1 tablet by mouth Daily With Breakfast., Disp: , Rfl:     Mounjaro 2.5 MG/0.5ML solution pen-injector, INJECT 2.5 MG SUB-Q EVERY WEEK AS DIRECTED FOR 28 DAYS, Disp: , Rfl:     naloxone (NARCAN) 4 MG/0.1ML  nasal spray, 1 spray into the nostril(s) as directed by provider As Needed., Disp: , Rfl:     oxyCODONE (OXY-IR) 5 MG capsule, Take 1 capsule by mouth Every 4 (Four) Hours As Needed for Moderate Pain., Disp: , Rfl:     polyethylene glycol (MIRALAX) 17 GM/SCOOP powder, , Disp: , Rfl:     Polyethylene Glycol powder, Daily As Needed., Disp: , Rfl:     PROAIR  (90 Base) MCG/ACT inhaler, Inhale 1 puff Every 4 (Four) Hours As Needed for Wheezing or Shortness of Air., Disp: , Rfl:     Qsymia 7.5-46 MG capsule sustained-release 24 hr, , Disp: , Rfl:     tamsulosin (FLOMAX) 0.4 MG capsule 24 hr capsule, Take 1 capsule by mouth Daily. For kidney stone expulsion, Disp: 30 capsule, Rfl: 1    zinc gluconate 50 MG tablet, Take 1 tablet by mouth Daily., Disp: , Rfl:     Past Medical History:   Diagnosis Date    Abnormal LFTs     Bipolar disorder     COVID-19     Diabetes mellitus     Dysphagia     Elevated cholesterol     Hypertension     Injury of back     Kidney stone     Sleep apnea     cpap       Past Surgical History:   Procedure Laterality Date    BACK SURGERY      prior discectomies at L3-4 and L4-5 and a laminectomy at L5 and S1 ([facility]times 2    COLONOSCOPY N/A 8/21/2023    Procedure: COLONOSCOPY WITH ANESTHESIA;  Surgeon: Jarred De Oliveira MD;  Location: [facility] ENDOSCOPY;  Service: Gastroenterology;  Laterality: N/A;  Pre: Encounter for screening for malignant neoplasm of colon;  Post:Polyp, diverticulosis;  Alphonso Palmer MD    CYSTOSCOPY, RETROGRADE PYELOGRAM AND STENT INSERTION Left 07/26/2022    Procedure: CYSTOSCOPY RETROGRADE PYELOGRAM AND STENT INSERTION;  Surgeon: Dat Vega MD;  Location: [facility] OR;  Service: Urology;  Laterality: Left;    EXTRACORPOREAL SHOCK WAVE LITHOTRIPSY (ESWL)      EXTRACORPOREAL SHOCKWAVE LITHOTRIPSY (ESWL), STENT INSERTION/REMOVAL Left 07/01/2022    Procedure: EXTRACORPOREAL SHOCKWAVE LITHOTRIPSY LEFT;  Surgeon: Lawson Pulido MD;  Location:  " [facility] OR;  Service: Urology;  Laterality: Left;    FRACTURE SURGERY Right     placed plate and one month later removed, foot    TONSILLECTOMY      URETEROSCOPY LASER LITHOTRIPSY WITH STENT INSERTION Left 2022    Procedure: URETEROSCOPY LASER LITHOTRIPSY WITH STENT INSERTION LEFT;  Surgeon: Fernando Trinidad MD;  Location:  [facility] OR;  Service: Urology;  Laterality: Left;    VASECTOMY         Social History     Socioeconomic History    Marital status:    Tobacco Use    Smoking status: Former     Packs/day: 1.00     Years: 15.00     Pack years: 15.00     Types: Cigarettes     Quit date:      Years since quittin.6    Smokeless tobacco: Never   Vaping Use    Vaping Use: Never used   Substance and Sexual Activity    Alcohol use: No    Drug use: No    Sexual activity: Yes     Partners: Female       Family History   Problem Relation Age of Onset    No Known Problems Mother     Colon polyps Father     Colon cancer Neg Hx        Objective    Temp 98.2 °F (36.8 °C)   Ht 188 cm (74\")   Wt (!) 159 kg (351 lb 3.2 oz)   BMI 45.09 kg/m²     Physical Exam  Vitals reviewed.   Constitutional:       General: He is not in acute distress.     Appearance: Normal appearance. He is not toxic-appearing.   HENT:      Head: Normocephalic and atraumatic.   Pulmonary:      Effort: Pulmonary effort is normal.   Skin:     Coloration: Skin is not pale.   Neurological:      Mental Status: He is alert.   Psychiatric:         Mood and Affect: Mood normal.         Behavior: Behavior normal.           Results for orders placed or performed in visit on 23   POC Urinalysis Dipstick, Multipro    Specimen: Urine   Result Value Ref Range    Color Yellow Yellow, Straw, Dark Yellow, Liya    Clarity, UA Clear Clear    Glucose, UA Negative Negative mg/dL    Bilirubin Negative Negative    Ketones, UA Negative Negative    Specific Gravity  1.030 1.005 - 1.030    Blood, UA Trace (A) Negative    pH, Urine 6.0 5.0 - 8.0    Protein, POC " Negative Negative mg/dL    Urobilinogen, UA Normal Normal, 0.2 E.U./dL    Nitrite, UA Negative Negative    Leukocytes Negative Negative     KUB independent review    A KUB is available for me to review today.  The image is inspected for a bowel gas pattern and the general bone structure of the spine and pelvis. The kidneys are then inspected closely.  Renal outline is noted if identifiable. The kidney, collecting system, and anticipated path of the ureter are examined for calcifications including those in the true pelvis.  This film reveals:    On the right there are no calcificaitons seen in the kidney or the expected course of the ureter. .    On the left there are no calcificaitons seen in the kidney or the expected course of the ureter. .  Assessment and Plan    Diagnoses and all orders for this visit:    1. Kidney stones (Primary)  -     POC Urinalysis Dipstick, Multipro    2. Bilateral flank pain  Symptoms currently mild at this time no obvious source of pain KUB shows no obvious kidney stones.  His past stones since he was last seen he does have small bilateral stones per CT scan however these are too small to be seen on the KUB.  I recommend he follow-up 1 year KUB prior.

## 2023-09-08 ENCOUNTER — TELEPHONE (OUTPATIENT)
Dept: UROLOGY | Facility: CLINIC | Age: 53
End: 2023-09-08
Payer: OTHER GOVERNMENT

## 2023-09-11 ENCOUNTER — HOSPITAL ENCOUNTER (OUTPATIENT)
Dept: GENERAL RADIOLOGY | Facility: HOSPITAL | Age: 53
Discharge: HOME | End: 2023-09-11
Admitting: PHYSICIAN ASSISTANT
Payer: OTHER GOVERNMENT

## 2023-09-11 ENCOUNTER — OFFICE VISIT (OUTPATIENT)
Dept: UROLOGY | Facility: CLINIC | Age: 53
End: 2023-09-11
Payer: OTHER GOVERNMENT

## 2023-09-11 VITALS — HEIGHT: 74 IN | TEMPERATURE: 98.2 F | BODY MASS INDEX: 40.43 KG/M2 | WEIGHT: 315 LBS

## 2023-09-11 DIAGNOSIS — R10.9 BILATERAL FLANK PAIN: ICD-10-CM

## 2023-09-11 DIAGNOSIS — N20.0 KIDNEY STONES: Primary | ICD-10-CM

## 2023-09-11 DIAGNOSIS — N20.0 KIDNEY STONES: ICD-10-CM

## 2023-09-11 LAB
BILIRUB BLD-MCNC: NEGATIVE MG/DL
CLARITY, POC: CLEAR
COLOR UR: YELLOW
GLUCOSE UR STRIP-MCNC: NEGATIVE MG/DL
KETONES UR QL: NEGATIVE
LEUKOCYTE EST, POC: NEGATIVE
NITRITE UR-MCNC: NEGATIVE MG/ML
PH UR: 6 [PH] (ref 5–8)
PROT UR STRIP-MCNC: NEGATIVE MG/DL
RBC # UR STRIP: ABNORMAL /UL
SP GR UR: 1.03 (ref 1–1.03)
UROBILINOGEN UR QL: NORMAL

## 2023-09-11 PROCEDURE — 81001 URINALYSIS AUTO W/SCOPE: CPT | Performed by: PHYSICIAN ASSISTANT

## 2023-09-11 PROCEDURE — 74018 RADEX ABDOMEN 1 VIEW: CPT

## 2023-09-11 PROCEDURE — 99214 OFFICE O/P EST MOD 30 MIN: CPT | Performed by: PHYSICIAN ASSISTANT

## 2023-09-24 ENCOUNTER — HOSPITAL ENCOUNTER (EMERGENCY)
Facility: HOSPITAL | Age: 53
Discharge: HOME | End: 2023-09-25
Admitting: EMERGENCY MEDICINE
Payer: OTHER GOVERNMENT

## 2023-09-24 ENCOUNTER — APPOINTMENT (OUTPATIENT)
Dept: CT IMAGING | Facility: HOSPITAL | Age: 53
End: 2023-09-24
Payer: OTHER GOVERNMENT

## 2023-09-24 DIAGNOSIS — N20.0 RENAL CALCULI: Primary | ICD-10-CM

## 2023-09-24 LAB
ALBUMIN SERPL-MCNC: 4.3 G/DL (ref 3.5–5.2)
ALBUMIN/GLOB SERPL: 1.4 G/DL
ALP SERPL-CCNC: 98 U/L (ref 39–117)
ALT SERPL W P-5'-P-CCNC: 42 U/L (ref 1–41)
ANION GAP SERPL CALCULATED.3IONS-SCNC: 12 MMOL/L (ref 5–15)
AST SERPL-CCNC: 23 U/L (ref 1–40)
BACTERIA UR QL AUTO: ABNORMAL /HPF
BASOPHILS # BLD AUTO: 0.04 10*3/MM3 (ref 0–0.2)
BASOPHILS NFR BLD AUTO: 0.5 % (ref 0–1.5)
BILIRUB SERPL-MCNC: 0.4 MG/DL (ref 0–1.2)
BILIRUB UR QL STRIP: NEGATIVE
BUN SERPL-MCNC: 17 MG/DL (ref 6–20)
BUN/CREAT SERPL: 21 (ref 7–25)
CALCIUM SPEC-SCNC: 9.8 MG/DL (ref 8.6–10.5)
CHLORIDE SERPL-SCNC: 103 MMOL/L (ref 98–107)
CLARITY UR: CLEAR
CO2 SERPL-SCNC: 23 MMOL/L (ref 22–29)
COLOR UR: YELLOW
CREAT SERPL-MCNC: 0.81 MG/DL (ref 0.76–1.27)
D-LACTATE SERPL-SCNC: 1.1 MMOL/L (ref 0.5–2)
DEPRECATED RDW RBC AUTO: 43.7 FL (ref 37–54)
EGFRCR SERPLBLD CKD-EPI 2021: 106.7 ML/MIN/1.73
EOSINOPHIL # BLD AUTO: 0.12 10*3/MM3 (ref 0–0.4)
EOSINOPHIL NFR BLD AUTO: 1.4 % (ref 0.3–6.2)
ERYTHROCYTE [DISTWIDTH] IN BLOOD BY AUTOMATED COUNT: 13.5 % (ref 12.3–15.4)
GLOBULIN UR ELPH-MCNC: 3.1 GM/DL
GLUCOSE SERPL-MCNC: 109 MG/DL (ref 65–99)
GLUCOSE UR STRIP-MCNC: NEGATIVE MG/DL
HCT VFR BLD AUTO: 46.8 % (ref 37.5–51)
HGB BLD-MCNC: 15.4 G/DL (ref 13–17.7)
HGB UR QL STRIP.AUTO: ABNORMAL
HYALINE CASTS UR QL AUTO: ABNORMAL /LPF
IMM GRANULOCYTES # BLD AUTO: 0.02 10*3/MM3 (ref 0–0.05)
IMM GRANULOCYTES NFR BLD AUTO: 0.2 % (ref 0–0.5)
KETONES UR QL STRIP: NEGATIVE
LEUKOCYTE ESTERASE UR QL STRIP.AUTO: NEGATIVE
LIPASE SERPL-CCNC: 22 U/L (ref 13–60)
LYMPHOCYTES # BLD AUTO: 1.75 10*3/MM3 (ref 0.7–3.1)
LYMPHOCYTES NFR BLD AUTO: 20.3 % (ref 19.6–45.3)
MCH RBC QN AUTO: 29.2 PG (ref 26.6–33)
MCHC RBC AUTO-ENTMCNC: 32.9 G/DL (ref 31.5–35.7)
MCV RBC AUTO: 88.8 FL (ref 79–97)
MONOCYTES # BLD AUTO: 0.56 10*3/MM3 (ref 0.1–0.9)
MONOCYTES NFR BLD AUTO: 6.5 % (ref 5–12)
NEUTROPHILS NFR BLD AUTO: 6.14 10*3/MM3 (ref 1.7–7)
NEUTROPHILS NFR BLD AUTO: 71.1 % (ref 42.7–76)
NITRITE UR QL STRIP: NEGATIVE
NRBC BLD AUTO-RTO: 0 /100 WBC (ref 0–0.2)
PH UR STRIP.AUTO: 7 [PH] (ref 5–8)
PLATELET # BLD AUTO: 186 10*3/MM3 (ref 140–450)
PMV BLD AUTO: 10.1 FL (ref 6–12)
POTASSIUM SERPL-SCNC: 3.9 MMOL/L (ref 3.5–5.2)
PROT SERPL-MCNC: 7.4 G/DL (ref 6–8.5)
PROT UR QL STRIP: NEGATIVE
RBC # BLD AUTO: 5.27 10*6/MM3 (ref 4.14–5.8)
RBC # UR STRIP: ABNORMAL /HPF
REF LAB TEST METHOD: ABNORMAL
SODIUM SERPL-SCNC: 138 MMOL/L (ref 136–145)
SP GR UR STRIP: 1.01 (ref 1–1.03)
SQUAMOUS #/AREA URNS HPF: ABNORMAL /HPF
UROBILINOGEN UR QL STRIP: ABNORMAL
WBC # UR STRIP: ABNORMAL /HPF
WBC NRBC COR # BLD: 8.63 10*3/MM3 (ref 3.4–10.8)

## 2023-09-24 PROCEDURE — 25510000001 IOPAMIDOL 61 % SOLUTION

## 2023-09-24 PROCEDURE — 85025 COMPLETE CBC W/AUTO DIFF WBC: CPT

## 2023-09-24 PROCEDURE — 74177 CT ABD & PELVIS W/CONTRAST: CPT

## 2023-09-24 PROCEDURE — 83605 ASSAY OF LACTIC ACID: CPT

## 2023-09-24 PROCEDURE — 80053 COMPREHEN METABOLIC PANEL: CPT

## 2023-09-24 PROCEDURE — 99285 EMERGENCY DEPT VISIT HI MDM: CPT

## 2023-09-24 PROCEDURE — 83690 ASSAY OF LIPASE: CPT

## 2023-09-24 PROCEDURE — 81001 URINALYSIS AUTO W/SCOPE: CPT

## 2023-09-24 RX ORDER — SODIUM CHLORIDE 0.9 % (FLUSH) 0.9 %
10 SYRINGE (ML) INJECTION AS NEEDED
Status: DISCONTINUED | OUTPATIENT
Start: 2023-09-24 | End: 2023-09-25 | Stop reason: HOSPADM

## 2023-09-24 RX ADMIN — SODIUM CHLORIDE, POTASSIUM CHLORIDE, SODIUM LACTATE AND CALCIUM CHLORIDE 1000 ML: 600; 310; 30; 20 INJECTION, SOLUTION INTRAVENOUS at 21:53

## 2023-09-24 RX ADMIN — IOPAMIDOL 100 ML: 612 INJECTION, SOLUTION INTRAVENOUS at 22:39

## 2023-09-24 NOTE — Clinical Note
[facility] EMERGENCY DEPARTMENT  [address]  formerly [facility] [address]  Phone: [phone]    Randell Reddy was seen and treated in our emergency department on 9/24/2023.  He may return to work on 09/27/2023.         Thank you for choosing [facility].    Shannan Oliva APRN

## 2023-09-25 VITALS
BODY MASS INDEX: 40.43 KG/M2 | DIASTOLIC BLOOD PRESSURE: 91 MMHG | SYSTOLIC BLOOD PRESSURE: 126 MMHG | RESPIRATION RATE: 15 BRPM | OXYGEN SATURATION: 95 % | WEIGHT: 315 LBS | TEMPERATURE: 97.8 F | HEART RATE: 76 BPM | HEIGHT: 74 IN

## 2023-09-25 PROCEDURE — 96374 THER/PROPH/DIAG INJ IV PUSH: CPT

## 2023-09-25 PROCEDURE — 96375 TX/PRO/DX INJ NEW DRUG ADDON: CPT

## 2023-09-25 PROCEDURE — 25010000002 HYDROMORPHONE PER 4 MG: Performed by: EMERGENCY MEDICINE

## 2023-09-25 PROCEDURE — 25010000002 ONDANSETRON PER 1 MG

## 2023-09-25 RX ORDER — ONDANSETRON 2 MG/ML
4 INJECTION INTRAMUSCULAR; INTRAVENOUS ONCE
Status: COMPLETED | OUTPATIENT
Start: 2023-09-25 | End: 2023-09-25

## 2023-09-25 RX ORDER — KETOROLAC TROMETHAMINE 10 MG/1
10 TABLET, FILM COATED ORAL EVERY 6 HOURS PRN
Qty: 10 TABLET | Refills: 0 | Status: SHIPPED | OUTPATIENT
Start: 2023-09-25

## 2023-09-25 RX ORDER — HYDROMORPHONE HYDROCHLORIDE 1 MG/ML
0.5 INJECTION, SOLUTION INTRAMUSCULAR; INTRAVENOUS; SUBCUTANEOUS ONCE
Status: COMPLETED | OUTPATIENT
Start: 2023-09-25 | End: 2023-09-25

## 2023-09-25 RX ADMIN — HYDROMORPHONE HYDROCHLORIDE 0.5 MG: 1 INJECTION, SOLUTION INTRAMUSCULAR; INTRAVENOUS; SUBCUTANEOUS at 00:32

## 2023-09-25 RX ADMIN — ONDANSETRON 4 MG: 2 INJECTION INTRAMUSCULAR; INTRAVENOUS at 00:32

## 2023-09-25 NOTE — DISCHARGE INSTRUCTIONS
Not you are seen in the ER and you do have a renal calculi on the right side.  It is small and nonobstructing please follow-up with your primary care.  Follow-up with your urologist if need be.  Return to ER if symptoms worsen you start having fever or any other new concerning symptoms.  I have prescribed you Toradol for pain take as needed do not take any other NSAIDs at the same time.  Does appear that you are on Flomax continue to take that.  If you are unable to void please return back to the ER.

## 2023-09-28 NOTE — ED PROVIDER NOTES
Subjective   History of Present Illness  Patient is a 52-year-old male that presents to the ER with his wife.  Patient has right-sided flank pain that started approximately at 1600 today.  Patient states this was a sudden sharp stabbing pain in the right flank.  Patient has a significant history of renal stones in the past and believes that is what this is.  Wife said that he did look poor when this first started.  Patient denies fever, denies nausea/vomiting, denies diarrhea/constipation.  Patient denies being short of air or chest pain at this time.  Patient denies blood in urine and denies painful urination.  Patient states he is able to void without difficulty at this time.  Patient has a history of bipolar disorder, diabetes, elevated cholesterol, hypertension, kidney stones and sleep apnea.      Review of Systems   Genitourinary:         Right flank pain that started about 4 PM   All other systems reviewed and are negative.    Past Medical History:   Diagnosis Date    Abnormal LFTs     Bipolar disorder     COVID-19     Diabetes mellitus     Dysphagia     Elevated cholesterol     Hypertension     Injury of back     Kidney stone     Sleep apnea     cpap       Allergies   Allergen Reactions    Bee Venom Anaphylaxis    Cardizem [Diltiazem] Other (See Comments)     unknown    Crestor [Rosuvastatin] Myalgia    Lisinopril Cough    Zetia [Ezetimibe] Other (See Comments)     unknown       Past Surgical History:   Procedure Laterality Date    BACK SURGERY      prior discectomies at L3-4 and L4-5 and a laminectomy at L5 and S1 ([facility] [facility]times 2    COLONOSCOPY N/A 8/21/2023    Procedure: COLONOSCOPY WITH ANESTHESIA;  Surgeon: Jarred De Oliveira MD;  Location: [facility] ENDOSCOPY;  Service: Gastroenterology;  Laterality: N/A;  Pre: Encounter for screening for malignant neoplasm of colon;  Post:Polyp, diverticulosis;  Alphonso Palmer MD    CYSTOSCOPY, RETROGRADE PYELOGRAM AND STENT INSERTION Left 07/26/2022     Procedure: CYSTOSCOPY RETROGRADE PYELOGRAM AND STENT INSERTION;  Surgeon: Dat Vega MD;  Location:  [facility] OR;  Service: Urology;  Laterality: Left;    EXTRACORPOREAL SHOCK WAVE LITHOTRIPSY (ESWL)      EXTRACORPOREAL SHOCKWAVE LITHOTRIPSY (ESWL), STENT INSERTION/REMOVAL Left 2022    Procedure: EXTRACORPOREAL SHOCKWAVE LITHOTRIPSY LEFT;  Surgeon: Lawson Pulido MD;  Location:  [facility] OR;  Service: Urology;  Laterality: Left;    FRACTURE SURGERY Right     placed plate and one month later removed, foot    TONSILLECTOMY      URETEROSCOPY LASER LITHOTRIPSY WITH STENT INSERTION Left 2022    Procedure: URETEROSCOPY LASER LITHOTRIPSY WITH STENT INSERTION LEFT;  Surgeon: Fernando Trinidad MD;  Location:  [facility] OR;  Service: Urology;  Laterality: Left;    VASECTOMY         Family History   Problem Relation Age of Onset    No Known Problems Mother     Colon polyps Father     Colon cancer Neg Hx        Social History     Socioeconomic History    Marital status:    Tobacco Use    Smoking status: Former     Packs/day: 1.00     Years: 15.00     Pack years: 15.00     Types: Cigarettes     Quit date:      Years since quittin.7    Smokeless tobacco: Never   Vaping Use    Vaping Use: Never used   Substance and Sexual Activity    Alcohol use: No    Drug use: No    Sexual activity: Yes     Partners: Female           Objective   Physical Exam  Vitals and nursing note reviewed.   Constitutional:       General: He is not in acute distress.     Appearance: Normal appearance. He is obese. He is not toxic-appearing or diaphoretic.   HENT:      Head: Normocephalic and atraumatic.      Right Ear: External ear normal.      Left Ear: External ear normal.      Nose: Nose normal.      Mouth/Throat:      Mouth: Mucous membranes are moist.   Eyes:      General:         Right eye: No discharge.         Left eye: No discharge.      Extraocular Movements: Extraocular movements intact.      Conjunctiva/sclera:  Conjunctivae normal.   Cardiovascular:      Rate and Rhythm: Normal rate.   Pulmonary:      Effort: Pulmonary effort is normal. No respiratory distress.      Breath sounds: Normal breath sounds. No rhonchi.      Comments: Lung sounds clear bilaterally  Abdominal:      General: Abdomen is flat.      Tenderness: There is no abdominal tenderness. There is no guarding or rebound.      Comments: CVA tenderness on the right side only, abdomen obese nondistended nontender upon palpation, bowel sounds active in all 4 quadrants   Musculoskeletal:         General: No deformity or signs of injury. Normal range of motion.      Cervical back: Normal range of motion.   Skin:     General: Skin is warm and dry.      Capillary Refill: Capillary refill takes less than 2 seconds.      Coloration: Skin is not jaundiced.      Comments: Skin warm pink and dry   Neurological:      General: No focal deficit present.      Mental Status: He is alert and oriented to person, place, and time. Mental status is at baseline.   Psychiatric:         Mood and Affect: Mood normal.         Behavior: Behavior normal.         Thought Content: Thought content normal.         Judgment: Judgment normal.       Procedures           ED Course                                           Medical Decision Making  Randell Reddy is a 52 y.o. male who presents to the ED for right flank pain started approximately 4 PM today.     Patient was non-toxic appearing on arrival. No acute distress was noted. Past medical history, surgical history, and medication regimen reviewed.     Vital signs hemodynamically stable and afebrile.       Patient's presentation raises suspicion for differentials including, but not limited to, renal calculi, renal colic, pyelonephritis, acute cystitis, small bowel obstruction.     Given this, Randell was placed on the monitor and IV access was obtained as needed. Laboratory studies and imaging studies were ordered.     Please refer to  ED course for labs and imaging results.  Labs Reviewed  COMPREHENSIVE METABOLIC PANEL - Abnormal; Notable for the following components:     Glucose                       109 (*)                ALT (SGPT)                    42 (*)              All other components within normal limits         Narrative: GFR Normal >60                  Chronic Kidney Disease <60                  Kidney Failure <15                    URINALYSIS W/ CULTURE IF INDICATED - Abnormal; Notable for the following components:     Blood, UA                     Large (3+) (*)            All other components within normal limits         Narrative: In absence of clinical symptoms, the presence of pyuria, bacteria, and/or nitrites on the urinalysis result does not correlate with infection.  URINALYSIS, MICROSCOPIC ONLY - Abnormal; Notable for the following components:     RBC, UA                         (*)                  WBC, UA                       0-2 (*)             All other components within normal limits  LIPASE - Normal  LACTIC ACID, PLASMA - Normal  CBC WITH AUTO DIFFERENTIAL - Normal    CT Abdomen Pelvis With Contrast   Final Result         1.  2 mm calculus in the proximal right ureter and 3 mm calculus in the    distal right ureter causing mild right hydronephrosis. There is also a 4    mm right interpolar renal calculus present. No left-sided calculi or    left-sided hydronephrosis.    2.  Diffuse hepatic steatosis.         These findings are in agreement with the critical and emergent findings    from the [facility] preliminary report.       Medications  lactated ringers bolus 1,000 mL (0 mL Intravenous Stopped 9/25/23 0035)  iopamidol (ISOVUE-300) 61 % injection 100 mL (100 mL Intravenous Given 9/24/23 2239)  HYDROmorphone (DILAUDID) injection 0.5 mg (0.5 mg Intravenous Given 9/25/23 0032)  ondansetron (ZOFRAN) injection 4 mg (4 mg Intravenous Given 9/25/23 0032)     On re-evaluation, patient remained hemodynamically stable.        Given findings described above, patient's presentation is likely consistent with nonobstructive renal calculi. I have a low suspicion for acute cystitis, pyelonephritis, renal colic, small bowel obstruction at this point in their ED course.  After reviewing patient's labs and CT it does appear that patient does have a nonobstructing stones within the right side of his ureter that may be causing the pain.  All  labs were unremarkable    I spoke with my supervising physician Dr. Bragg who is agreeable to my course of evaluation, treatment, disposition at this time.    I went over the work-up with the patient and his wife and explained that patient does have 2 nonobstructing stones and they are very small in nature he should be able to pass without difficulty.  Patient states he does have a history of kidney stones and this is what it feels like.  I advised him he should monitor for worsening symptoms including fever, urinary retention, blood in his urine.  Patient gave verbal understanding of this.  Patient is most also is taking Flomax and does not need to start on it due to it being on his home medicines that he is currently taking.    I answered all the questions regarding the emergency department evaluation, diagnosis, and treatment plan in plain and simple language that was understandable.     I said that there is always some diagnostic uncertainty in the ER and went over the fact that the symptoms may change or new symptoms may reveal themselves after being discharged.     Because of this, I said that it is important that Randell follows up, by calling as soon as possible to set up an appointment, with the primary care doctor within the next few days or as soon as reasonably possible so that the symptoms can be re-evaluated for improvement or for any other questions.          Problems Addressed:  Renal calculi: complicated acute illness or injury    Amount and/or Complexity of Data  Reviewed  Labs: ordered.  Radiology: ordered.    Risk  Prescription drug management.        Final diagnoses:   Renal calculi       ED Disposition  ED Disposition       ED Disposition   Discharge    Condition   Stable    Comment   --               Alphonso Palmer MD  [address]  [address]  [facility] [id number] [phone]    Schedule an appointment as soon as possible for a visit       [facility] EMERGENCY DEPARTMENT  [address] [id number] [phone]    If symptoms worsen         Medication List        New Prescriptions      ketorolac 10 MG tablet  Commonly known as: TORADOL  Take 1 tablet by mouth Every 6 (Six) Hours As Needed for Moderate Pain for up to 10 doses.               Where to Get Your Medications        These medications were sent to [facility] [address] [place] [address] [facility] [phone] [facility] [phone] [address] [address]      Phone: [phone]   ketorolac 10 MG tablet            Shannan Oliva, APRSTERLING  09/27/23 5557

## 2024-01-30 ENCOUNTER — TRANSCRIBE ORDERS (OUTPATIENT)
Dept: PHYSICAL THERAPY | Facility: CLINIC | Age: 54
End: 2024-01-30
Payer: OTHER GOVERNMENT

## 2024-01-30 DIAGNOSIS — M54.59 OTHER LOW BACK PAIN: Primary | ICD-10-CM

## 2024-01-31 ENCOUNTER — TREATMENT (OUTPATIENT)
Dept: PHYSICAL THERAPY | Facility: CLINIC | Age: 54
End: 2024-01-31
Payer: OTHER GOVERNMENT

## 2024-01-31 DIAGNOSIS — M54.41 ACUTE RIGHT-SIDED LOW BACK PAIN WITH RIGHT-SIDED SCIATICA: Primary | ICD-10-CM

## 2024-01-31 NOTE — PROGRESS NOTES
Physical Therapy Initial Evaluation and Plan of Care  [address] [place] [address]    Patient: Randell Reddy               : 1970  Visit Date: 2024  Referring practitioner: Radu Haley,*  Date of Initial Visit: 2024  Patient seen for 1 sessions    Visit Diagnoses:    ICD-10-CM ICD-9-CM   1. Acute right-sided low back pain with right-sided sciatica  M54.41 724.2     724.3     Past Medical History:   Diagnosis Date    Abnormal LFTs     Bipolar disorder     COVID-19     Diabetes mellitus     Dysphagia     Elevated cholesterol     Hypertension     Injury of back     Kidney stone     Sleep apnea     cpap     Past Surgical History:   Procedure Laterality Date    BACK SURGERY      prior discectomies at L3-4 and L4-5 and a laminectomy at L5 and S1 ([facility])times 2    COLONOSCOPY N/A 2023    Procedure: COLONOSCOPY WITH ANESTHESIA;  Surgeon: Jarred De Oliveira MD;  Location: [facility] ENDOSCOPY;  Service: Gastroenterology;  Laterality: N/A;  Pre: Encounter for screening for malignant neoplasm of colon;  Post:Polyp, diverticulosis;  Alphonso Palmer MD    CYSTOSCOPY, RETROGRADE PYELOGRAM AND STENT INSERTION Left 2022    Procedure: CYSTOSCOPY RETROGRADE PYELOGRAM AND STENT INSERTION;  Surgeon: Dat Vega MD;  Location: [facility] OR;  Service: Urology;  Laterality: Left;    EXTRACORPOREAL SHOCK WAVE LITHOTRIPSY (ESWL)      EXTRACORPOREAL SHOCKWAVE LITHOTRIPSY (ESWL), STENT INSERTION/REMOVAL Left 2022    Procedure: EXTRACORPOREAL SHOCKWAVE LITHOTRIPSY LEFT;  Surgeon: Lawson Pulido MD;  Location: [facility] OR;  Service: Urology;  Laterality: Left;    FRACTURE SURGERY Right     placed plate and one month later removed, foot    TONSILLECTOMY      URETEROSCOPY LASER LITHOTRIPSY WITH STENT INSERTION Left 2022    Procedure: URETEROSCOPY LASER LITHOTRIPSY WITH STENT INSERTION LEFT;  Surgeon:  Fernando Trinidad MD;  Location: [facility] OR;  Service: Urology;  Laterality: Left;    VASECTOMY           SUBJECTIVE     Subjective Evaluation    History of Present Illness  Date of onset: 1/10/2024  Mechanism of injury: He says he was watching TV about 3 weeks ago and couldn't get comfortable. He started having pain down his right lateral leg to his foot. This leg pain has been constant. He's noticed some weakness in right ankle. He has a h/o 2 back surgeries with diskectomies for LLE sciatica. He was told her needs back surgery but was advised to wait as long as he could. He denies any changes in b/b.      Patient Occupation: [employer]; ; deskjob mostly Pain  Current pain ratin  At best pain ratin  At worst pain ratin  Location: right glute to L5 dermatome  Quality: radiating, needle-like and dull ache  Relieving factors: change in position  Aggravating factors: prolonged positioning and ambulation (driving)  Progression: worsening    Social Support  Lives with: spouse ([place])    Diagnostic Tests  CT scan: abnormal (severe DDD L5/S1)    Patient Goals  Patient goals for therapy: increased motion, decreased pain, increased strength, independence with ADLs/IADLs and return to sport/leisure activities       Outcome Measure:   Oswestry:        OBJECTIVE     Objective          Static Posture     Shoulders  Rounded.    Thoracic Spine  Hyperkyphosis.    Lumbar Spine   Increased lordosis.     Pelvis   Anterior pelvic tilt    Palpation     Right   No palpable tenderness to the erector spinae.     Additional Palpation Details  Right piriformis not tender    Tenderness     Lumbar Spine  No tenderness in the facet joint.     Additional Tenderness Details  Right SIJ not tender    Neurological Testing     Sensation     Lumbar     Right   Diminished: light touch    Comments   Right light touch: L5 dermatome    Reflexes   Left   Patellar (L4): normal (2+)    Right   Patellar (L4): normal  "(2+)    Active Range of Motion     Lumbar   Flexion: WFL  Extension: Active lumbar extension: 75%, hinges into extension at L5/S1.     Passive Range of Motion     Additional Passive Range of Motion Details  Right hip passive extension to 0 deg    Strength/Myotome Testing     Additional Strength Details  Graham hips/LE grossly 5/5 except left ankle due to old nerve damage    Tests     Lumbar   Positive repeated extension (worsened radicular pain).   Negative repeated flexion.     Right   Negative passive SLR.     Right Pelvic Girdle/Sacrum   Negative: sacral spring and thigh thrust.       Dry Needle Location [20560 (1-2 muscles)/20561 (3 or more muscles)]   Location Depth (\") Comment   Right L4,5 post cut 3    Right inf gluteal 3    Right proximal med/lat ham 2    Right saphenous 2 2 needles   Right sural 2                   Time 15     Modalities Comments    estim Ch1: lumbar, Ch2; saphenous  Mode: H, frequency: 10  Intensity 4-5       Minutes 10     Therapy Education/Self Care 89646   Education offered today HEP  Risk/benefits of DN   Medbride Code    Ongoing HEP   Walk with PPT and shorter steps   Timed Minutes        Total Timed Treatment:     0   mins  Total Time of Visit:            45   mins    ASSESSMENT/PLAN     GOALS:  Goals                                                  Progress Note due by 3/1/24                                                              Recert due by 4/2924   STG by: 4 weeks Comments Date Status   Improve graham thoracolumbar mobility       Improve  right hip ext to 10 deg               LTG by: 8 weeks       Reports no radicular symptoms in right LE for a week except for occasional minor twinges relieved with exs.        Able to walk 20 min without exacerbation of pain       Reports pain no greater than 2-3/10 when it does occur.       Improve Oswestry to 10 or better      Independent with HEP for flexibility and core/hip stability.          Assessment & Plan "       Assessment  Impairments: abnormal muscle firing, abnormal muscle tone, abnormal or restricted ROM, activity intolerance, impaired physical strength, lacks appropriate home exercise program and pain with function   Functional limitations: lifting, walking, uncomfortable because of pain, sitting and standing   Assessment details: His symptoms are consistent with the severe DDD of L5/S1 with right radicular pain along L5 dermatome. He stands in a swayback and hips flexors are tight which could magnify the compression at his lower lumbar. I did a trial of dry needling today and he said his leg felt more relaxed.   This patient would benefit from skilled PT.  Prognosis: good    Plan  Therapy options: will be seen for skilled therapy services  Planned modality interventions: dry needling, low level laser therapy, TENS and traction  Planned therapy interventions: abdominal trunk stabilization, flexibility, functional ROM exercises, home exercise program, joint mobilization, manual therapy, motor coordination training, neuromuscular re-education, postural training, soft tissue mobilization, spinal/joint mobilization, strengthening, stretching and therapeutic activities  Frequency: 2x week  Duration in weeks: 8  Treatment plan discussed with: patient  Plan details: Focus early on pain relief with soft tissue work and modalities as needed. Work on  mobility and flexibility through the spine and hips. Progress with postural/core/hip stability to improve postural alignment and mechanics.Progress the HEP for the same.        SIGNATURE: Ruiz Mcclelland, PT, [place] License #: [id number]  Electronically Signed on 1/31/2024      Initial Certification  Certification Period: 1/31/2024 through 4/29/2024  I certify that the therapy services are furnished while this patient is under my care.  The services outlined above are required by this patient, and will be reviewed every 90 days.     PHYSICIAN: Radu Haley MD (NPI:  [id number])    Signature____________________________________________DATE: _________     Please sign and return via fax to [phone].   Thank you so much for letting us work with Randell. I appreciate your letting us work with your patients. If you have any questions or concerns, please don't hesitate to contact me.          [address] [address]  [phone]

## 2024-01-31 NOTE — PROGRESS NOTES
Physical Therapy Initial Evaluation and Plan of Care  [address] [place] [address]    Patient: Randell Reddy               : 1970  Visit Date: 2024  Referring practitioner: Radu Haley,*  Date of Initial Visit: 2024  Patient seen for Visit count could not be calculated. Make sure you are using a visit which is associated with an episode. sessions    Visit Diagnoses:  No diagnosis found.  Past Medical History:   Diagnosis Date    Abnormal LFTs     Bipolar disorder     COVID-19     Diabetes mellitus     Dysphagia     Elevated cholesterol     Hypertension     Injury of back     Kidney stone     Sleep apnea     cpap     Past Surgical History:   Procedure Laterality Date    BACK SURGERY      prior discectomies at L3-4 and L4-5 and a laminectomy at L5 and S1 ([facility])times 2    COLONOSCOPY N/A 2023    Procedure: COLONOSCOPY WITH ANESTHESIA;  Surgeon: Jarred De Oliveira MD;  Location: [facility] ENDOSCOPY;  Service: Gastroenterology;  Laterality: N/A;  Pre: Encounter for screening for malignant neoplasm of colon;  Post:Polyp, diverticulosis;  Alphonso Palmer MD    CYSTOSCOPY, RETROGRADE PYELOGRAM AND STENT INSERTION Left 2022    Procedure: CYSTOSCOPY RETROGRADE PYELOGRAM AND STENT INSERTION;  Surgeon: Dat Vega MD;  Location: [facility] OR;  Service: Urology;  Laterality: Left;    EXTRACORPOREAL SHOCK WAVE LITHOTRIPSY (ESWL)      EXTRACORPOREAL SHOCKWAVE LITHOTRIPSY (ESWL), STENT INSERTION/REMOVAL Left 2022    Procedure: EXTRACORPOREAL SHOCKWAVE LITHOTRIPSY LEFT;  Surgeon: Lawson Pulido MD;  Location: [facility] OR;  Service: Urology;  Laterality: Left;    FRACTURE SURGERY Right     placed plate and one month later removed, foot    TONSILLECTOMY      URETEROSCOPY LASER LITHOTRIPSY WITH STENT INSERTION Left 2022    Procedure: URETEROSCOPY LASER LITHOTRIPSY WITH STENT INSERTION LEFT;   Surgeon: Fernando Trinidad MD;  Location: [facility] OR;  Service: Urology;  Laterality: Left;    VASECTOMY           SUBJECTIVE     Subjective Evaluation    History of Present Illness  Mechanism of injury:     Surgical hx: 2 sx on his back (laminectomy/discectomy on L3/4 and L4/5)    Pt was tx for LBP in this clinic end of 2019/beginning 2020             Outcome Measure:   MOLBPDQ: ***       OBJECTIVE     Objective     LE MMT   HIP Strength L Strength R   flexion      extension      ABD      ADD           KNEE     flexion      extension           ANKLE     dorsiflexion      plantarflexion      eversion      inversion      *pain    Therapy Education/Self Care 77896   Education offered today POC, anatomy, HEP below   Medbride Code    Ongoing HEP   ***   Timed Minutes        Total Timed Treatment:     ***   mins  Total Time of Visit:            ***   mins    ASSESSMENT/PLAN     GOALS:  Goals                                                  Progress Note due by 3/1/2024                                                              Recert due by 4/30/2024   LTG by: 12 weeks Comments Date Status   Pt will report >/=***% improvement in B LE radicular s/s.       Improve gross B LE strength to >/=4+/5       Improve gross lumbar AROM all directions to >/=***% and pain-free.       Reports pain </=***/10 at worst consistently w/ performance of ADL/IADLs, functional mobility, and work-related tasks.       Improve Modified Oswestry to </=***      Independent with HEP for flexibility and core/hip stability.          Assessment & Plan       Assessment  Impairments: abnormal gait, abnormal muscle firing, abnormal muscle tone, abnormal or restricted ROM, activity intolerance, impaired balance, impaired physical strength, lacks appropriate home exercise program, pain with function, safety issue and weight-bearing intolerance   Functional limitations: carrying objects, lifting, sleeping, walking, pulling, pushing, uncomfortable because of  pain, moving in bed, sitting, standing, stooping and unable to perform repetitive tasks   Assessment details: Randell Reddy is a 53 year old male who presents with chronic LBP w/w/o L/R sided radicular s/s. The pt exhibits hypomobility in the *** spine, gross RLE/LLE hip weakness with suspected core weakness as well likely contributing to ***. The pt's lumbar ROM is *** interfering with ADLs and functional mobility. Suspect extension/flexion preference d/t ***. Due to the aforementioned anatomical and functional limitations, the pt will require skilled OP PT services to optimize functional capacity and QOL.   Prognosis: good    Plan  Therapy options: will be seen for skilled therapy services  Planned modality interventions: cryotherapy, dry needling, electrical stimulation/Russian stimulation, TENS, low level laser therapy, thermotherapy (hydrocollator packs), traction and ultrasound  Planned therapy interventions: abdominal trunk stabilization, manual therapy, motor coordination training, ADL retraining, balance/weight-bearing training, neuromuscular re-education, body mechanics training, postural training, fine motor coordination training, soft tissue mobilization, spinal/joint mobilization, flexibility, functional ROM exercises, strengthening, stretching, gait training, home exercise program, therapeutic activities, IADL retraining and joint mobilization  Frequency: 2x week  Duration in weeks: 12  Treatment plan discussed with: patient  Plan details: Patient will be seen 2x/wk x 12wks with treatment to include strengthening, stretching, manual therapy, neuromuscular re-education, balance, gait and endurance training.          SIGNATURE: Adela Petersen PT DPT, [place] License #: [id number]  Electronically Signed on 1/31/2024      Initial Certification  Certification Period: 1/31/2024 through 4/29/2024  I certify that the therapy services are furnished while this patient is under my care.  The services outlined  above are required by this patient, and will be reviewed every 90 days.     PHYSICIAN: Radu Haley MD (NPI: [id number])    Signature____________________________________________DATE: _________     Please sign and return via fax to [phone].   Thank you so much for letting us work with Randell. I appreciate your letting us work with your patients. If you have any questions or concerns, please don't hesitate to contact me.          [address] [address]  [phone]

## 2024-02-05 ENCOUNTER — TRANSCRIBE ORDERS (OUTPATIENT)
Dept: ADMINISTRATIVE | Facility: HOSPITAL | Age: 54
End: 2024-02-05
Payer: OTHER GOVERNMENT

## 2024-02-05 ENCOUNTER — HOSPITAL ENCOUNTER (OUTPATIENT)
Dept: CT IMAGING | Facility: HOSPITAL | Age: 54
Discharge: HOME | End: 2024-02-05
Payer: COMMERCIAL

## 2024-02-05 ENCOUNTER — HOSPITAL ENCOUNTER (OUTPATIENT)
Dept: GENERAL RADIOLOGY | Facility: HOSPITAL | Age: 54
Discharge: HOME | End: 2024-02-05
Payer: COMMERCIAL

## 2024-02-05 ENCOUNTER — LAB (OUTPATIENT)
Dept: LAB | Facility: HOSPITAL | Age: 54
End: 2024-02-05
Payer: COMMERCIAL

## 2024-02-05 DIAGNOSIS — R20.2 PARESTHESIA OF SKIN: Primary | ICD-10-CM

## 2024-02-05 DIAGNOSIS — R20.2 PARESTHESIA OF SKIN: ICD-10-CM

## 2024-02-05 DIAGNOSIS — M25.512 LEFT SHOULDER PAIN, UNSPECIFIED CHRONICITY: ICD-10-CM

## 2024-02-05 DIAGNOSIS — R51.9 NONINTRACTABLE HEADACHE, UNSPECIFIED CHRONICITY PATTERN, UNSPECIFIED HEADACHE TYPE: ICD-10-CM

## 2024-02-05 LAB
ALBUMIN SERPL-MCNC: 4.5 G/DL (ref 3.5–5)
ALBUMIN/GLOB SERPL: 1.3 G/DL (ref 1.1–2.5)
ALP SERPL-CCNC: 112 U/L (ref 24–120)
ALT SERPL W P-5'-P-CCNC: 57 U/L (ref 0–50)
ANION GAP SERPL CALCULATED.3IONS-SCNC: 9 MMOL/L (ref 4–13)
AST SERPL-CCNC: 35 U/L (ref 7–45)
BILIRUB SERPL-MCNC: 0.5 MG/DL (ref 0.1–1)
BUN SERPL-MCNC: 19 MG/DL (ref 5–21)
BUN/CREAT SERPL: 20
CALCIUM SPEC-SCNC: 10.1 MG/DL (ref 8.4–10.4)
CHLORIDE SERPL-SCNC: 101 MMOL/L (ref 98–110)
CO2 SERPL-SCNC: 29 MMOL/L (ref 24–31)
CREAT SERPL-MCNC: 0.95 MG/DL (ref 0.5–1.4)
EGFRCR SERPLBLD CKD-EPI 2021: 96.3 ML/MIN/1.73
GLOBULIN UR ELPH-MCNC: 3.5 GM/DL
GLUCOSE SERPL-MCNC: 150 MG/DL (ref 70–100)
POTASSIUM SERPL-SCNC: 3.9 MMOL/L (ref 3.5–5.3)
PROT SERPL-MCNC: 8 G/DL (ref 6.3–8.7)
SODIUM SERPL-SCNC: 139 MMOL/L (ref 135–145)

## 2024-02-05 PROCEDURE — 72040 X-RAY EXAM NECK SPINE 2-3 VW: CPT

## 2024-02-05 PROCEDURE — 80053 COMPREHEN METABOLIC PANEL: CPT | Performed by: NURSE PRACTITIONER

## 2024-02-05 PROCEDURE — 70450 CT HEAD/BRAIN W/O DYE: CPT

## 2024-02-05 PROCEDURE — 73030 X-RAY EXAM OF SHOULDER: CPT

## 2024-02-05 PROCEDURE — 84443 ASSAY THYROID STIM HORMONE: CPT

## 2024-02-05 PROCEDURE — 36415 COLL VENOUS BLD VENIPUNCTURE: CPT

## 2024-02-05 PROCEDURE — 82607 VITAMIN B-12: CPT

## 2024-02-05 PROCEDURE — 84439 ASSAY OF FREE THYROXINE: CPT

## 2024-02-06 LAB
T4 FREE SERPL-MCNC: 1.17 NG/DL (ref 0.93–1.7)
TSH SERPL DL<=0.05 MIU/L-ACNC: 1.25 UIU/ML (ref 0.27–4.2)
VIT B12 BLD-MCNC: 374 PG/ML (ref 211–946)

## 2024-02-08 ENCOUNTER — TREATMENT (OUTPATIENT)
Dept: PHYSICAL THERAPY | Facility: CLINIC | Age: 54
End: 2024-02-08
Payer: OTHER GOVERNMENT

## 2024-02-08 DIAGNOSIS — M54.41 ACUTE RIGHT-SIDED LOW BACK PAIN WITH RIGHT-SIDED SCIATICA: Primary | ICD-10-CM

## 2024-02-08 PROCEDURE — 97140 MANUAL THERAPY 1/> REGIONS: CPT | Performed by: PHYSICAL THERAPIST

## 2024-02-08 PROCEDURE — 97110 THERAPEUTIC EXERCISES: CPT | Performed by: PHYSICAL THERAPIST

## 2024-02-08 NOTE — PROGRESS NOTES
Physical Therapy Treatment Note  [address] [place] [address]    Patient: Randell Reddy                                                 Visit Date: 2024  :     1970    Referring practitioner:    Radu Haley,*  Date of Initial Visit:          Type: THERAPY  Noted: 2024    Patient seen for 2 sessions    Visit Diagnoses:    ICD-10-CM ICD-9-CM   1. Acute right-sided low back pain with right-sided sciatica  M54.41 724.2     724.3     SUBJECTIVE     Subjective:  He says that he got some relief after the eval but it's returned down his right leg. He says he is now having some right arm radicular pain. He started a muscle relaxer recently but it knocks him out and he can't use it at work. .  Today, his back and right leg aren't very good.     PAIN: 7/10         OBJECTIVE     Objective     Manual Therapy     47747  Comments   Screened left UE numbness.    Percussive IASTM using Powerboost to right lumbar/piriformis and IP at L2 using ball attachment      Sidelying right lumbar/piriformis deep tissue release    Sidelying right LS man distraction    Assess OI for tone Not tender or guarded   Right LE LAD sustained    Timed Minutes 45     Therapeutic Exercises    33531 Units Comments   Right piriformis/OI     Lumbar LTR with sacral float with wedge under sacrum     Lumbar flexion/right rotation               Timed Minutes 15           Therapy Education/Self Care 10427   Education offered today HEP  Risk/benefits of DN   Medbride Code    Ongoing HEP   Walk with PPT and shorter steps   Timed Minutes        Total Timed Treatment:     60   mins  Total Time of Visit:            60   mins    ASSESSMENT/PLAN     GOALS:  Goals                                                  Progress Note due by 3/1/24                                                              Recert due by 2924   STG by: 4 weeks Comments Date Status   Improve  geovany thoracolumbar mobility       Improve  right hip ext to 10 deg               LTG by: 8 weeks       Reports no radicular symptoms in right LE for a week except for occasional minor twinges relieved with exs.        Able to walk 20 min without exacerbation of pain       Reports pain no greater than 2-3/10 when it does occur.       Improve Oswestry to 10 or better      Independent with HEP for flexibility and core/hip stability.         Assessment/Plan     ASSESSMENT:   Today was his first visit after his eval. He is still having radicular symptoms but he had no sciatica leaving today. I checked the tone of his OI to see if it might be impinging a branch off the sciatica nerve and it was OK. Our emphasis is trying to open his L5/S1 segment and improve mobility above and below.   I screened his left UE but nothing I tested reproduced his symptoms. The distribution is consistent with a CTS.     PLAN:   Cont with mobility of his lower lumbar.     SIGNATURE: Ruiz Mcclelland, PT, [place] License #: [id number]  Electronically Signed on 2/8/2024        [address] [place] [address] [place] [address]  [phone]

## 2024-02-13 ENCOUNTER — TREATMENT (OUTPATIENT)
Dept: PHYSICAL THERAPY | Facility: CLINIC | Age: 54
End: 2024-02-13
Payer: OTHER GOVERNMENT

## 2024-02-13 DIAGNOSIS — M54.41 ACUTE RIGHT-SIDED LOW BACK PAIN WITH RIGHT-SIDED SCIATICA: Primary | ICD-10-CM

## 2024-02-13 PROCEDURE — 97140 MANUAL THERAPY 1/> REGIONS: CPT | Performed by: PHYSICAL THERAPIST

## 2024-02-13 PROCEDURE — 97110 THERAPEUTIC EXERCISES: CPT | Performed by: PHYSICAL THERAPIST

## 2024-02-15 ENCOUNTER — HOSPITAL ENCOUNTER (OUTPATIENT)
Dept: NEUROLOGY | Age: 54
Discharge: HOME | End: 2024-02-15
Payer: COMMERCIAL

## 2024-02-15 DIAGNOSIS — R20.2 PARESTHESIA OF SKIN: ICD-10-CM

## 2024-02-15 PROCEDURE — 95886 MUSC TEST DONE W/N TEST COMP: CPT

## 2024-02-15 PROCEDURE — 95909 NRV CNDJ TST 5-6 STUDIES: CPT

## 2024-03-04 ENCOUNTER — TREATMENT (OUTPATIENT)
Dept: PHYSICAL THERAPY | Facility: CLINIC | Age: 54
End: 2024-03-04
Payer: OTHER GOVERNMENT

## 2024-03-04 DIAGNOSIS — M54.41 ACUTE RIGHT-SIDED LOW BACK PAIN WITH RIGHT-SIDED SCIATICA: Primary | ICD-10-CM

## 2024-03-04 PROCEDURE — 97140 MANUAL THERAPY 1/> REGIONS: CPT | Performed by: PHYSICAL THERAPIST

## 2024-03-04 PROCEDURE — 97110 THERAPEUTIC EXERCISES: CPT | Performed by: PHYSICAL THERAPIST

## 2024-03-04 NOTE — PROGRESS NOTES
Physical Therapy Treatment Note and 30 Day Progress Note  [address] [place] [address]    Patient: Randell IZAGUIRRE Link                                                 Visit Date: 3/4/2024  :     1970    Referring practitioner:    Radu Haley,*  Date of Initial Visit:          Type: THERAPY  Noted: 2024    Patient seen for 4 sessions    Visit Diagnoses:    ICD-10-CM ICD-9-CM   1. Acute right-sided low back pain with right-sided sciatica  M54.41 724.2     724.3     SUBJECTIVE     Subjective:  He is having an MRI of his neck which showed some OA and stenosis with a bone spur on his left neck so he is being sent to a neurosurgeon.     PAIN: 0/10       OBJECTIVE     Objective     Manual Therapy     75455  Comments   Sidelying right lumbar deep tissue release    Percussive IASTM using Powerboost to right lumbar/piriformis and IP at L2 using ball attachment      Sidelying right lumbar/piriformis deep tissue release    Sidelying right LS man distraction    Prone LS man distraction    Prone thoracic ext mobs    geovany LE LAD sustained    Timed Minutes 30     Therapeutic Exercises    05788 Units Comments   geovany piriformis/OI stretch          Lumbar flexion/right rotation               Timed Minutes 10           Therapy Education/Self Care 67510   Education offered today HEP  Risk/benefits of DN   Medbride Code    Ongoing HEP   Walk with PPT and shorter steps   Timed Minutes        Total Timed Treatment:     40   mins  Total Time of Visit:            40  mins    ASSESSMENT/PLAN     GOALS:  Goals                                                  Progress Note due by 4/3/24                                                              Recert due by 2924   STG by: 4 weeks Comments Date Status   Improve geovany thoracolumbar mobility  stiff 3/4 ongoing   Improve  right hip ext to 10 deg               LTG by: 8 weeks       Reports no radicular  symptoms in right LE for a week except for occasional minor twinges relieved with exs.   still having radicular symptoms 3/4 ongoing   Able to walk 20 min without exacerbation of pain  Can walk or stand 8-10 min before needing to sit 3/4 ongoing   Reports pain no greater than 2-3/10 when it does occur.  0/10 at times, up to 7-8/10 at worst 3/4 ongoing   Improve Oswestry to 10 or better 15 3/4 ongoing   Independent with HEP for flexibility and core/hip stability.  emphasis on flexibility 3/4 ongoing     Assessment & Plan       Assessment  Impairments: abnormal muscle firing, abnormal muscle tone, abnormal or restricted ROM, activity intolerance, impaired physical strength, lacks appropriate home exercise program and pain with function   Functional limitations: lifting, walking, uncomfortable because of pain, sitting and standing   Prognosis: good    Plan  Therapy options: will be seen for skilled therapy services  Planned modality interventions: dry needling, low level laser therapy, TENS and traction  Planned therapy interventions: abdominal trunk stabilization, flexibility, functional ROM exercises, home exercise program, joint mobilization, manual therapy, motor coordination training, neuromuscular re-education, postural training, soft tissue mobilization, spinal/joint mobilization, strengthening, stretching and therapeutic activities  Frequency: 2x week  Duration in weeks: 8  Treatment plan discussed with: patient       ASSESSMENT:   He now is on the schedule consistently and we will be able to progress well. Our emphasis is on improving mobility so far.     PLAN:   Cont with mobility of his lower lumbar and progress with core and hip stability.      SIGNATURE: Ruiz Mcclelland, PT, [place] License #: [id number]  Electronically Signed on 3/4/2024        [facility] [place] [address]  [phone]

## 2024-03-07 ENCOUNTER — TREATMENT (OUTPATIENT)
Dept: PHYSICAL THERAPY | Facility: CLINIC | Age: 54
End: 2024-03-07
Payer: OTHER GOVERNMENT

## 2024-03-07 DIAGNOSIS — M54.41 ACUTE RIGHT-SIDED LOW BACK PAIN WITH RIGHT-SIDED SCIATICA: Primary | ICD-10-CM

## 2024-03-07 PROCEDURE — 97535 SELF CARE MNGMENT TRAINING: CPT | Performed by: PHYSICAL THERAPIST

## 2024-03-07 PROCEDURE — 97140 MANUAL THERAPY 1/> REGIONS: CPT | Performed by: PHYSICAL THERAPIST

## 2024-03-07 PROCEDURE — 97110 THERAPEUTIC EXERCISES: CPT | Performed by: PHYSICAL THERAPIST

## 2024-03-07 NOTE — PROGRESS NOTES
"                                                                Physical Therapy Treatment Note  [address] [place] [address]    Patient: Randell Reddy                                                 Visit Date: 3/7/2024  :     1970    Referring practitioner:    Radu Haley,*  Date of Initial Visit:          Type: THERAPY  Noted: 2024    Patient seen for 5 sessions    Visit Diagnoses:    ICD-10-CM ICD-9-CM   1. Acute right-sided low back pain with right-sided sciatica  M54.41 724.2     724.3     SUBJECTIVE     Subjective:  He is concerned about the numbness in his hand.    PAIN: 0/10       OBJECTIVE     Objective     Manual Therapy     86699  Comments   Prone geovany LS manual distraction    Prone geovany LS MFR    Geovany right piriformis deep tissue release                    Timed Minutes 30     Therapeutic Exercises    30570 Units Comments   Lower abdominal crunch     Plank off knees 10\" x 3    Standing birddog to hip hydrants  Geovany UE support needed; left knee buckled   Sacral float with hips at 90          Timed Minutes 15       Therapy Education/Self Care 65385   Education offered today Educated on anatomy of lower lumbar degeneration.   Educated on TLIF vs disc replacement   Medbride Code    Ongoing HEP   Walk with PPT and shorter steps   Timed Minutes 15       Total Timed Treatment:     40   mins  Total Time of Visit:            40  mins    ASSESSMENT/PLAN     GOALS:  Goals                                                  Progress Note due by 4/3/24                                                              Recert due by 2924   STG by: 4 weeks Comments Date Status   Improve geovany thoracolumbar mobility  stiff 3/4 ongoing   Improve  right hip ext to 10 deg               LTG by: 8 weeks       Reports no radicular symptoms in right LE for a week except for occasional minor twinges relieved with exs.   still having radicular symptoms 3/7 ongoing   Able to walk 20 min without " exacerbation of pain  Can walk or stand 8-10 min before needing to sit 3/4 ongoing   Reports pain no greater than 2-3/10 when it does occur.  0/10 at times, up to 7-8/10 at worst 3/7 ongoing   Improve Oswestry to 10 or better 15 3/4 ongoing   Independent with HEP for flexibility and core/hip stability.  emphasis on flexibility 3/4 ongoing     Assessment/Plan     ASSESSMENT:   We continue to work on improving his mobility and started more with his core stability today. His CT from a few months ago shows his L5/S1 disc is gone. He may ultimately need less conservative measures.    PLAN:   Cont with mobility of his lower lumbar and progress with core and hip stability.      SIGNATURE: Ruiz Mcclelland, PT, [place] License #: [id number]  Electronically Signed on 3/7/2024        [address] [place] [address] [address]. [address]  [phone]

## 2024-03-20 ENCOUNTER — OFFICE VISIT (OUTPATIENT)
Dept: NEUROSURGERY | Facility: CLINIC | Age: 54
End: 2024-03-20
Payer: OTHER GOVERNMENT

## 2024-03-20 VITALS — BODY MASS INDEX: 40.43 KG/M2 | WEIGHT: 315 LBS | HEIGHT: 74 IN

## 2024-03-20 DIAGNOSIS — E66.01 CLASS 3 SEVERE OBESITY DUE TO EXCESS CALORIES WITH SERIOUS COMORBIDITY AND BODY MASS INDEX (BMI) OF 40.0 TO 44.9 IN ADULT: ICD-10-CM

## 2024-03-20 DIAGNOSIS — R20.0 NUMBNESS AND TINGLING IN LEFT HAND: Primary | ICD-10-CM

## 2024-03-20 DIAGNOSIS — G56.02 CARPAL TUNNEL SYNDROME OF LEFT WRIST: ICD-10-CM

## 2024-03-20 DIAGNOSIS — R20.2 NUMBNESS AND TINGLING IN LEFT HAND: Primary | ICD-10-CM

## 2024-03-20 PROCEDURE — 99214 OFFICE O/P EST MOD 30 MIN: CPT | Performed by: NURSE PRACTITIONER

## 2024-03-20 NOTE — PATIENT INSTRUCTIONS
"DASH Eating Plan  DASH stands for Dietary Approaches to Stop Hypertension. The DASH eating plan is a healthy eating plan that has been shown to:  Reduce high blood pressure (hypertension).  Reduce your risk for type 2 diabetes, heart disease, and stroke.  Help with weight loss.  What are tips for following this plan?  Reading food labels  Check food labels for the amount of salt (sodium) per serving. Choose foods with less than 5 percent of the Daily Value of sodium. Generally, foods with less than 300 milligrams (mg) of sodium per serving fit into this eating plan.  To find whole grains, look for the word \"whole\" as the first word in the ingredient list.  Shopping  Buy products labeled as \"low-sodium\" or \"no salt added.\"  Buy fresh foods. Avoid canned foods and pre-made or frozen meals.  Cooking  Avoid adding salt when cooking. Use salt-free seasonings or herbs instead of table salt or sea salt. Check with your health care provider or pharmacist before using salt substitutes.  Do not martin foods. Cook foods using healthy methods such as baking, boiling, grilling, roasting, and broiling instead.  Cook with heart-healthy oils, such as olive, canola, avocado, soybean, or sunflower oil.  Meal planning    Eat a balanced diet that includes:  4 or more servings of fruits and 4 or more servings of vegetables each day. Try to fill one-half of your plate with fruits and vegetables.  6-8 servings of whole grains each day.  Less than 6 oz (170 g) of lean meat, poultry, or fish each day. A 3-oz (85-g) serving of meat is about the same size as a deck of cards. One egg equals 1 oz (28 g).  2-3 servings of low-fat dairy each day. One serving is 1 cup (237 mL).  1 serving of nuts, seeds, or beans 5 times each week.  2-3 servings of heart-healthy fats. Healthy fats called omega-3 fatty acids are found in foods such as walnuts, flaxseeds, fortified milks, and eggs. These fats are also found in cold-water fish, such as sardines, salmon, " and mackerel.  Limit how much you eat of:  Canned or prepackaged foods.  Food that is high in trans fat, such as some fried foods.  Food that is high in saturated fat, such as fatty meat.  Desserts and other sweets, sugary drinks, and other foods with added sugar.  Full-fat dairy products.  Do not salt foods before eating.  Do not eat more than 4 egg yolks a week.  Try to eat at least 2 vegetarian meals a week.  Eat more home-cooked food and less restaurant, buffet, and fast food.  Lifestyle  When eating at a restaurant, ask that your food be prepared with less salt or no salt, if possible.  If you drink alcohol:  Limit how much you use to:  0-1 drink a day for women who are not pregnant.  0-2 drinks a day for men.  Be aware of how much alcohol is in your drink. In the [place] one drink equals one 12 oz bottle of beer (355 mL), one 5 oz glass of wine (148 mL), or one 1½ oz glass of hard liquor (44 mL).  General information  Avoid eating more than 2,300 mg of salt a day. If you have hypertension, you may need to reduce your sodium intake to 1,500 mg a day.  Work with your health care provider to maintain a healthy body weight or to lose weight. Ask what an ideal weight is for you.  Get at least 30 minutes of exercise that causes your heart to beat faster (aerobic exercise) most days of the week. Activities may include walking, swimming, or biking.  Work with your health care provider or dietitian to adjust your eating plan to your individual calorie needs.  What foods should I eat?  Fruits  All fresh, dried, or frozen fruit. Canned fruit in natural juice (without added sugar).  Vegetables  Fresh or frozen vegetables (raw, steamed, roasted, or grilled). Low-sodium or reduced-sodium tomato and vegetable juice. Low-sodium or reduced-sodium tomato sauce and tomato paste. Low-sodium or reduced-sodium canned vegetables.  Grains  Whole-grain or whole-wheat bread. Whole-grain or whole-wheat pasta. Brown rice. Oatmeal. Quinoa.  Bulgur. Whole-grain and low-sodium cereals. Marline bread. Low-fat, low-sodium crackers. Whole-wheat flour tortillas.  Meats and other proteins  Skinless chicken or turkey. Ground chicken or turkey. Pork with fat trimmed off. Fish and seafood. Egg whites. Dried beans, peas, or lentils. Unsalted nuts, nut butters, and seeds. Unsalted canned beans. Lean cuts of beef with fat trimmed off. Low-sodium, lean precooked or cured meat, such as sausages or meat loaves.  Dairy  Low-fat (1%) or fat-free (skim) milk. Reduced-fat, low-fat, or fat-free cheeses. Nonfat, low-sodium ricotta or cottage cheese. Low-fat or nonfat yogurt. Low-fat, low-sodium cheese.  Fats and oils  Soft margarine without trans fats. Vegetable oil. Reduced-fat, low-fat, or light mayonnaise and salad dressings (reduced-sodium). Canola, safflower, olive, avocado, soybean, and sunflower oils. Avocado.  Seasonings and condiments  Herbs. Spices. Seasoning mixes without salt.  Other foods  Unsalted popcorn and pretzels. Fat-free sweets.  The items listed above may not be a complete list of foods and beverages you can eat. Contact a dietitian for more information.  What foods should I avoid?  Fruits  Canned fruit in a light or heavy syrup. Fried fruit. Fruit in cream or butter sauce.  Vegetables  Creamed or fried vegetables. Vegetables in a cheese sauce. Regular canned vegetables (not low-sodium or reduced-sodium). Regular canned tomato sauce and paste (not low-sodium or reduced-sodium). Regular tomato and vegetable juice (not low-sodium or reduced-sodium). Pickles. Olives.  Grains  Baked goods made with fat, such as croissants, muffins, or some breads. Dry pasta or rice meal packs.  Meats and other proteins  Fatty cuts of meat. Ribs. Fried meat. Chow. Bologna, salami, and other precooked or cured meats, such as sausages or meat loaves. Fat from the back of a pig (fatback). Bratwurst. Salted nuts and seeds. Canned beans with added salt. Canned or smoked fish.  Whole eggs or egg yolks. Chicken or turkey with skin.  Dairy  Whole or 2% milk, cream, and half-and-half. Whole or full-fat cream cheese. Whole-fat or sweetened yogurt. Full-fat cheese. Nondairy creamers. Whipped toppings. Processed cheese and cheese spreads.  Fats and oils  Butter. Stick margarine. Lard. Shortening. Ghee. Chow fat. Tropical oils, such as coconut, palm kernel, or palm oil.  Seasonings and condiments  Onion salt, garlic salt, seasoned salt, table salt, and sea salt. Worcestershire sauce. Tartar sauce. Barbecue sauce. Teriyaki sauce. Soy sauce, including reduced-sodium. Steak sauce. Canned and packaged gravies. Fish sauce. Oyster sauce. Cocktail sauce. Store-bought horseradish. Ketchup. Mustard. Meat flavorings and tenderizers. Bouillon cubes. Hot sauces. Pre-made or packaged marinades. Pre-made or packaged taco seasonings. Relishes. Regular salad dressings.  Other foods  Salted popcorn and pretzels.  The items listed above may not be a complete list of foods and beverages you should avoid. Contact a dietitian for more information.  Where to find more information  [employer] [place]: www.nhlbi.nih.gov  American Heart Association: www.heart.org  Academy of Nutrition and Dietetics: www.eatright.org  National Kidney Foundation: www.kidney.org  Summary  The DASH eating plan is a healthy eating plan that has been shown to reduce high blood pressure (hypertension). It may also reduce your risk for type 2 diabetes, heart disease, and stroke.  When on the DASH eating plan, aim to eat more fresh fruits and vegetables, whole grains, lean proteins, low-fat dairy, and heart-healthy fats.  With the DASH eating plan, you should limit salt (sodium) intake to 2,300 mg a day. If you have hypertension, you may need to reduce your sodium intake to 1,500 mg a day.  Work with your health care provider or dietitian to adjust your eating plan to your individual calorie needs.  This information is not  intended to replace advice given to you by your health care provider. Make sure you discuss any questions you have with your health care provider.  Document Revised: 11/20/2020 Document Reviewed: 11/20/2020  [employer] Patient Education © 2023 [employer]

## 2024-03-20 NOTE — PROGRESS NOTES
Primary Care Provider: Alphonso Palmer MD    Chief Complaint:   Chief Complaint   Patient presents with    Hand Pain     Pt is here with complaints of numbness and tingling in left hand.     History of Present Illness    HISTORY/ HPI:  Randell Reddy is a 53 y.o.  male who present today with his wife with a complaint of left hand numbness and tingling, predominantly digit 1 and 2.  History of lumbar discectomy at L3-4 and L4-5 and open laminectomy at L5-S1, 1998 and 2008 at [facility].      Gradual and progressive onset of left upper extremity numbness and tingling over the past 2+ months.  At onset, his numbness and tingling dysesthesias started at the tip of digit 2 and has progressively worsened to include the entirety of digit 2 and medial aspect of digit 1.  His symptoms have resulted in a decline in fine motor skills.  He denies neck or upper extremity radicular pain or weakness.  Since onset he has undergone an EMG and nerve conduction study to suggest mild left carpal tunnel.  No findings to suggest radiculopathy from the cervical spine.  [facility] Randell in a left cock up wrist splint as a means of conservative management for carpal tunnel syndrome, as well as obtain an MRI of the cervical spine to rule out cervical stenosis as a contributing factor to his upper extremity dysesthesias.  No significant aggravating factors.  He does endorse mild improvement in his left upper extremity symptoms with use of a cock-up wrist splint which he wears nightly.  He denies gait or balance abnormalities.  Mr. Reddy additionally denies fevers, chills, night sweats, unexplained weight loss, saddle anesthesia, or bowel or bladder dysfunction. He currently rates the severity of his symptoms 2/10.  No additional concerns at this time.      Oswestry Disability Index = 12%   Score   Pain Intensity Very mild pain - 1   Personal Care Look after myself  normally without causing extra pain-0   Lifting Lift heavy weights but gives extra pain-1   Reading Read without pain-0   Headaches No headaches-0   Concentration Concentrate Fully-0   Work I can do my usual work, but no more-2   Driving I can drive-0   Sleeping Less than 1 hour sleepiness-1   Recreation All recreational activities with some pain-1     SCORE INTERPRETATION OF THE OSWESTRY NECK DISABILITY QUESTIONNAIRE  10-28: Mild disability   (Primghar et al, 1980)    Modified [employer] (mJOA) score  CATEGORY SCORE   Upper extremity Motor Subscore Normal hand coordination-5   Lower Extremity Subscore Able to walk without walking aid but must hold handrail-4   Upper Extremity Sensory Score Mild loss of hand sensation-2   Urinary Function Subscore Normal urination-3   TOTAL = 14/18    The mJOA is an 18 point score of functional disability specific to cervical myelopathy.   12-14: Moderate Myelopathy  Dayron et al. (1991). Juana et al.     The mJOA is an 18 point score of functional disability specific to cervical myelopathy. Dayron et al. (1991).[2]    ROS:  Review of Systems   Constitutional: Negative.    HENT: Negative.     Eyes: Negative.    Respiratory:  Positive for apnea.    Cardiovascular: Negative.    Gastrointestinal: Negative.    Endocrine: Negative.    Genitourinary:  Positive for flank pain.   Musculoskeletal:  Positive for back pain.   Skin: Negative.    Allergic/Immunologic: Negative.    Neurological:  Positive for numbness.   Hematological: Negative.    Psychiatric/Behavioral: Negative.     All other systems reviewed and are negative.    Past Medical History:   Diagnosis Date    Abnormal LFTs     Bipolar disorder     COVID-19     Diabetes mellitus     Dysphagia     Elevated cholesterol     Hypertension     Injury of back     Kidney stone     Sleep apnea     cpap     Past Surgical History:   Procedure Laterality Date    BACK SURGERY      prior discectomies at L3-4 and L4-5 and  a laminectomy at L5 and S1 ([facility]times 2    COLONOSCOPY N/A 8/21/2023    Procedure: COLONOSCOPY WITH ANESTHESIA;  Surgeon: Jarred De Oliveira MD;  Location:  [facility];  Service: Gastroenterology;  Laterality: N/A;  Pre: Encounter for screening for malignant neoplasm of colon;  Post:Polyp, diverticulosis;  Alphonso Palmer MD    CYSTOSCOPY, RETROGRADE PYELOGRAM AND STENT INSERTION Left 07/26/2022    Procedure: CYSTOSCOPY RETROGRADE PYELOGRAM AND STENT INSERTION;  Surgeon: Dat Vega MD;  Location:  [facility];  Service: Urology;  Laterality: Left;    EXTRACORPOREAL SHOCK WAVE LITHOTRIPSY (ESWL)      EXTRACORPOREAL SHOCKWAVE LITHOTRIPSY (ESWL), STENT INSERTION/REMOVAL Left 07/01/2022    Procedure: EXTRACORPOREAL SHOCKWAVE LITHOTRIPSY LEFT;  Surgeon: Lawson Pulido MD;  Location:  [facility];  Service: Urology;  Laterality: Left;    FRACTURE SURGERY Right     placed plate and one month later removed, foot    TONSILLECTOMY      URETEROSCOPY LASER LITHOTRIPSY WITH STENT INSERTION Left 08/08/2022    Procedure: URETEROSCOPY LASER LITHOTRIPSY WITH STENT INSERTION LEFT;  Surgeon: Fernando Trinidad MD;  Location:  [facility];  Service: Urology;  Laterality: Left;    VASECTOMY       Family History: family history includes Colon polyps in his father; No Known Problems in his mother.    Social History:  reports that he quit smoking about 9 years ago. His smoking use included cigarettes. He started smoking about 24 years ago. He has a 15 pack-year smoking history. He has never used smokeless tobacco. He reports that he does not drink alcohol and does not use drugs.    Medications:    Current Outpatient Medications:     acetaminophen (TYLENOL) 325 MG tablet, Take 2 tablets by mouth Every 4 (Four) Hours As Needed for Mild Pain ., Disp: , Rfl:     ascorbic acid (VITAMIN C) 1000 MG tablet, Take 1 tablet by mouth Daily., Disp: , Rfl:     EPINEPHrine (EPIPEN) 0.3 MG/0.3ML solution auto-injector  injection, Inject 1 syringe into the appropriate muscle as directed by prescriber., Disp: , Rfl:     folic acid (FOLVITE) 800 MCG tablet, Take 1 tablet by mouth Daily., Disp: , Rfl:     glucose 4-6 GM-MG per chewable tablet, Chew 2 tablets As Needed for Low Blood Sugar., Disp: , Rfl:     Glucose-Vitamin C-Vitamin D (TRUEplus Glucose) chewable tablet, Chew 1 tablet Daily As Needed for Low Blood Sugar., Disp: , Rfl:     ibuprofen (ADVIL,MOTRIN) 800 MG tablet, Take 1 tablet by mouth Every 6 (Six) Hours As Needed for Mild Pain., Disp: , Rfl:     losartan-hydrochlorothiazide (HYZAAR) 50-12.5 MG per tablet, , Disp: , Rfl:     metFORMIN ER (GLUCOPHAGE-XR) 500 MG 24 hr tablet, Take 1 tablet by mouth Daily With Breakfast., Disp: , Rfl:     Mounjaro 2.5 MG/0.5ML solution pen-injector, INJECT 2.5 MG SUB-Q EVERY WEEK AS DIRECTED FOR 28 DAYS, Disp: , Rfl:     naloxone (NARCAN) 4 MG/0.1ML nasal spray, 1 spray into the nostril(s) as directed by provider As Needed., Disp: , Rfl:     oxyCODONE (OXY-IR) 5 MG capsule, Take 1 capsule by mouth Every 4 (Four) Hours As Needed for Moderate Pain., Disp: , Rfl:     polyethylene glycol (MIRALAX) 17 GM/SCOOP powder, , Disp: , Rfl:     Polyethylene Glycol powder, Daily As Needed., Disp: , Rfl:     PROAIR  (90 Base) MCG/ACT inhaler, Inhale 1 puff Every 4 (Four) Hours As Needed for Wheezing or Shortness of Air., Disp: , Rfl:     Qsymia 7.5-46 MG capsule sustained-release 24 hr, , Disp: , Rfl:     tamsulosin (FLOMAX) 0.4 MG capsule 24 hr capsule, Take 1 capsule by mouth Daily. For kidney stone expulsion, Disp: 30 capsule, Rfl: 1    zinc gluconate 50 MG tablet, Take 1 tablet by mouth Daily., Disp: , Rfl:     ketorolac (TORADOL) 10 MG tablet, Take 1 tablet by mouth Every 6 (Six) Hours As Needed for Moderate Pain for up to 10 doses., Disp: 10 tablet, Rfl: 0    Allergies:  Bee venom, Cardizem [diltiazem], Crestor [rosuvastatin], Lisinopril, and Zetia [ezetimibe]    OBJECTIVE:  Objective   Ht  "188 cm (74\")   Wt (!) 156 kg (344 lb 9.6 oz)   BMI 44.24 kg/m²   Physical Exam  Vitals and nursing note reviewed.   Constitutional:       General: He is not in acute distress.     Appearance: Normal appearance. He is well-developed and well-groomed. He is morbidly obese. He is not ill-appearing, toxic-appearing or diaphoretic.      Comments: BMI 44.24   HENT:      Head: Normocephalic and atraumatic.      Right Ear: Hearing normal.      Left Ear: Hearing normal.   Eyes:      Extraocular Movements: EOM normal.      Conjunctiva/sclera: Conjunctivae normal.      Pupils: Pupils are equal, round, and reactive to light.   Neck:      Trachea: Trachea normal.   Cardiovascular:      Rate and Rhythm: Normal rate and regular rhythm.   Pulmonary:      Effort: Pulmonary effort is normal. No tachypnea, bradypnea, accessory muscle usage or respiratory distress.   Abdominal:      Palpations: Abdomen is soft.   Musculoskeletal:      Cervical back: Full passive range of motion without pain and neck supple.   Skin:     General: Skin is warm and dry.   Neurological:      Mental Status: He is alert and oriented to person, place, and time.      GCS: GCS eye subscore is 4. GCS verbal subscore is 5. GCS motor subscore is 6.      Gait: Gait is intact.      Deep Tendon Reflexes:      Reflex Scores:       Tricep reflexes are 2+ on the right side and 2+ on the left side.       Bicep reflexes are 2+ on the right side and 2+ on the left side.       Brachioradialis reflexes are 2+ on the right side and 2+ on the left side.       Patellar reflexes are 2+ on the right side and 2+ on the left side.       Achilles reflexes are 2+ on the right side and 0 on the left side.  Psychiatric:         Speech: Speech normal.         Behavior: Behavior normal. Behavior is cooperative.       Neurologic Exam     Mental Status   Oriented to person, place, and time.   Attention: normal. Concentration: normal.   Speech: speech is normal   Level of consciousness: " alert    Cranial Nerves     CN II   Visual fields full to confrontation.     CN III, IV, VI   Pupils are equal, round, and reactive to light.  Extraocular motions are normal.     CN V   Facial sensation intact.     CN VII   Facial expression full, symmetric.     CN VIII   CN VIII normal.     CN IX, X   CN IX normal.     CN XI   CN XI normal.     Motor Exam   Right arm tone: normal  Left arm tone: normal  Right leg tone: normal  Left leg tone: normal    Strength   Right deltoid: 5/5  Left deltoid: 5/5  Right biceps: 5/5  Left biceps: 5/5  Right triceps: 5/5  Left triceps: 5/5  Right wrist extension: 5/5  Left wrist extension: 5/5  Right iliopsoas: 5/5  Left iliopsoas: 5/5  Right quadriceps: 5/5  Left quadriceps: 5/5  Right anterior tibial: 5/5  Left anterior tibial: 5/5  Right gastroc: 5/5  Left gastroc: 5/5  Right EHL 5/5  Left EHL 5/5       Sensory Exam   Right arm light touch: normal  Left arm light touch: decreased from fingers  Right leg light touch: normal  Left leg light touch: normal  Sensory deficit distribution on left: median    Gait, Coordination, and Reflexes     Gait  Gait: normal    Tremor   Resting tremor: absent  Intention tremor: absent  Action tremor: absent    Reflexes   Right brachioradialis: 2+  Left brachioradialis: 2+  Right biceps: 2+  Left biceps: 2+  Right triceps: 2+  Left triceps: 2+  Right patellar: 2+  Left patellar: 2+  Right achilles: 2+  Left achilles: 0  Right plantar: equivocal  Left plantar: equivocal  Right Morales: absent  Left Morales: absent  Right ankle clonus: absent  Left ankle clonus: absent  Right pendular knee jerk: absent  Left pendular knee jerk: absent    Male  strength (pounds)  AGE Right Hand RH Norms Left Hand LH Norms   20-24  121±20.6  104±21.8   25-29  120±23.0  110±16.2   30-34  121±22.4  110±21.7   35-39  119±24  113±21.7   40-44  117±20.7  112±18.7   45-49  110±23.0  101±22.8   50-54 *140 113±18.1 138 102±17   55-59  101±26.7  83±23.4   60-64  90±20.4   77±20.3   65-69  91±20.6  76.8±19.8   70-74  75±21.5  65±18.1   75+  66±21.0  55±17.0   (ADONAY Adams et al; Hand Dynometer: Effects of trials and sessions.  Perpetual and Motor Skills 61:195-8, 1985)  * = Dominant hand  > = Intervention     Imaging: (independent review and interpretation)  3/8/2024.  X-rays of the cervical spine show no evidence of acute fractures, straightening of the normal cervical lordosis and degenerative disc disease most notable from C4-C7.      2/26/2024.  MRI of the cervical spine shows no STIR signal change suggest acute fractures, no T2 signal change within the central cord, straightening of the normal cervical lordosis with a subtle retrolisthesis of C3 over C4, multilevel degenerative changes, right paracentral disc protrusion at C4-5 resulting in moderate thecal sac compression and right foraminal stenosis, and a broad-based disc protrusion at C5-6 resulting in severe thecal sac compression and bilateral foraminal stenosis.  No significant cervical stenosis noted throughout the cervical spine.          Comparative imaging      ASSESSMENT/ PLAN:  Randell Reddy is a 53 y.o. male with a significant medical history of lumbar discectomy at L3-4 and L4-5 and open laminectomy at L5-S1 ([employer], followed by [facility]), hypertension, hyperlipidemia, diabetes, sleep apnea, hearing loss, bipolar disorder, former smoker, and obesity.  He presents with a new problem of persistent numbness and tingling to digit 1 and 2 of the left hand. ADÁN: 12.  mJOA: 14.  Physical exam findings of decree sensation to digit 1 and to the left hand, absent left Achilles, and equivocal plantar reflexes, otherwise neurologically intact.  His imaging shows right paracentral disc protrusion at C4-5 resulting in moderate thecal sac compression and right foraminal stenosis, and a broad-based disc protrusion at C5-6 resulting in severe thecal sac compression and bilateral foraminal stenosis.  No significant cervical  stenosis noted throughout the cervical spine.  EMG and nerve conduction study obtained through [facility] on 2/15/2024 shown mild left carpal tunnel.    RECOMMENDATIONS ...  Mild left carpal tunnel syndrome  Mr. Reddy presents today with a complaint of progressively worsening numbness and tingling to digit 1 and 2 of the left hand over the past 2+ months.  He denies neck or upper extremity radicular pain or weakness.  He has trialed conservative management with bracing with mild improvement in his left upper extremity dysesthesias.  He has also [facility] of his cervical spine which shows mild thecal sac compression and right foraminal stenosis at C4-5, and moderate to severe thecal sac compression with bilateral foraminal stenosis at C5-6, however no significant central cord compression or T2 signal change within the central cord.  His history of presenting illnesses, physical assessment, and imaging was discussed and reviewed with Dr. Paz.  Considering he has no significant central stenosis or physical exam findings to suggest upper extremity radicular pain or weakness, there are no concerns for proceeding with left car[facility] at [facility]'s discretion.  He should continue to wear his cock-up wrist splint at night as a means of conservative management for carpal tunnel.  Will have him return for reevaluation with Dr. Paz in 3 months.  Mr. Reddy knows he may contact the neurosurgical clinic to return sooner for any new or additional concerns and agrees to this plan of care.    Class 3 obesity (BMI >= 40) due to excessive calories with serious comorbidities BMI of 40.0-44.9 in adult  Body mass index is 44.24 kg/m². Information on the DASH diet provided in the AVS.  We will continue to provided diet and exercise information with the goal of weight loss at each scheduled appointment.     Diagnoses and all orders for  this visit:    1. Numbness and tingling in left hand (Primary)    2. Carpal tunnel syndrome of left wrist    3. Class 3 severe obesity due to excess calories with serious comorbidity and body mass index (BMI) of 40.0 to 44.9 in adult      Return for FOLLOWUP WITH DR. GUARDADO IN 3 MOS..    Thank you for this Consultation and the opportunity to participate in [facility]'s care.    Sincerely,  EMERY Jin

## 2024-03-25 ENCOUNTER — TREATMENT (OUTPATIENT)
Dept: PHYSICAL THERAPY | Facility: CLINIC | Age: 54
End: 2024-03-25
Payer: OTHER GOVERNMENT

## 2024-03-25 DIAGNOSIS — M54.41 ACUTE RIGHT-SIDED LOW BACK PAIN WITH RIGHT-SIDED SCIATICA: Primary | ICD-10-CM

## 2024-03-25 PROCEDURE — 97110 THERAPEUTIC EXERCISES: CPT | Performed by: PHYSICAL THERAPIST

## 2024-03-25 PROCEDURE — 97140 MANUAL THERAPY 1/> REGIONS: CPT | Performed by: PHYSICAL THERAPIST

## 2024-03-25 NOTE — PROGRESS NOTES
Physical Therapy Treatment Note  [address] [place] [address]    Patient: Randell IZAGUIRRE Link                                                 Visit Date: 3/25/2024  :     1970    Referring practitioner:    Radu Haley,*  Date of Initial Visit:          Type: THERAPY  Noted: 2024    Patient seen for 6 sessions    Visit Diagnoses:    ICD-10-CM ICD-9-CM   1. Acute right-sided low back pain with right-sided sciatica  M54.41 724.2     724.3     SUBJECTIVE     Subjective:  He is says he is still struggling with right radicular numbness and limping with it.     PAIN: 0/10       OBJECTIVE     Objective     Manual Therapy     [id number]  Comments   Sidelying left lumbar/piriformis MFR    RLE LAD sustained   Sacral float over wedge with OP    Lumbar roll mob with strong OP                Timed Minutes 30     Therapeutic Exercises    04463 Units Comments   Passive piriformis stretch right      Lumbar flexion/right rotation     Right ham stretch     Sidelying right hip abd wipers          Timed Minutes 15       Therapy Education/Self Care [id number]   Education offered today Educated on anatomy of lower lumbar degeneration.   Educated on TLIF vs disc replacement   [employer] Code    Ongoing HEP   Walk with PPT and shorter steps   Timed Minutes        Total Timed Treatment:     45   mins  Total Time of Visit:            45  mins    ASSESSMENT/PLAN     GOALS:  Goals                                                  Progress Note due by 4/3/24                                                              Recert due by 2924   STG by: 4 weeks Comments Date Status   Improve geovany thoracolumbar mobility  stiff 3/4 ongoing   Improve  right hip ext to 10 deg               LTG by: 8 weeks       Reports no radicular symptoms in right LE for a week except for occasional minor twinges relieved with exs.   still having radicular symptoms 3/25 ongoing   Able to  walk 20 min without exacerbation of pain  Can walk or stand 8-10 min before needing to sit 3/4 ongoing   Reports pain no greater than 2-3/10 when it does occur.  0/10 at times, up to 7-8/10 at worst 3/7 ongoing   Improve Oswestry to 10 or better 15 3/4 ongoing   Independent with HEP for flexibility and core/hip stability.  emphasis on flexibility 3/4 ongoing     Assessment/Plan     ASSESSMENT:   He is still having constent radicular symptoms.     PLAN:   Cont with mobility of his lower lumbar and progress with core and hip stability.      SIGNATURE: Ruiz Mcclelland, PT, [place] License #: [id number]  Electronically Signed on 3/25/2024        [address] [place] [address] [place] [address]  [phone]

## 2024-03-27 ENCOUNTER — TREATMENT (OUTPATIENT)
Dept: PHYSICAL THERAPY | Facility: CLINIC | Age: 54
End: 2024-03-27
Payer: OTHER GOVERNMENT

## 2024-03-27 DIAGNOSIS — M54.41 ACUTE RIGHT-SIDED LOW BACK PAIN WITH RIGHT-SIDED SCIATICA: Primary | ICD-10-CM

## 2024-03-27 NOTE — PROGRESS NOTES
Physical Therapy Treatment Note  [address] [place] [address]    Patient: Randell IZAGUIRRE Link                                                 Visit Date: 3/27/2024  :     1970    Referring practitioner:    Radu Haley,*  Date of Initial Visit:          Type: THERAPY  Noted: 2024    Patient seen for 7 sessions    Visit Diagnoses:    ICD-10-CM ICD-9-CM   1. Acute right-sided low back pain with right-sided sciatica  M54.41 724.2     724.3     SUBJECTIVE     Subjective:  He is says he is still having tingling in his foot with walking.     PAIN: 0/10       OBJECTIVE     Objective     Manual Therapy     [id number]  Comments   Percussive IASTM using Powerboost to right piri/lower lumbar at L2 using spade attachment      RLE LAD sustained   Sacral float over wedge with OP                    Timed Minutes 15     Therapeutic Exercises    85202 Units Comments   Passive piriformis stretch right      Lumbar flexion/right rotation     Lower abdominal crunches 10    HL thoracic extension stretch over horizontal foam roll          Timed Minutes 15     Mechanical Traction   85599 Comments   Lumbar pneumatic traction 100# x 10 min       Untimed Minutes 10         Therapy Education/Self Care [id number]   Education offered today Educated on anatomy of lower lumbar degeneration.   Educated on TLIF vs disc replacement   [employer] Code    Ongoing HEP   Walk with PPT and shorter steps   Timed Minutes        Total Timed Treatment:     30   mins  Total Time of Visit:            40  mins    ASSESSMENT/PLAN     GOALS:  Goals                                                  Progress Note due by 4/3/24                                                              Recert due by 2924   STG by: 4 weeks Comments Date Status   Improve geovany thoracolumbar mobility  stiff 3/4 ongoing   Improve  right hip ext to 10 deg               LTG by: 8 weeks       Reports no  radicular symptoms in right LE for a week except for occasional minor twinges relieved with exs.   still having radicular symptoms 3/27 ongoing   Able to walk 20 min without exacerbation of pain  Can walk or stand 8-10 min before needing to sit 3/4 ongoing   Reports pain no greater than 2-3/10 when it does occur.  0/10 at times, up to 7-8/10 at worst 3/7 ongoing   Improve Oswestry to 10 or better 15 3/4 ongoing   Independent with HEP for flexibility and core/hip stability.  emphasis on flexibility 3/4 ongoing     Assessment/Plan     ASSESSMENT:   He is still having constent radicular symptoms. I tried the lumbar traction today to see if this might open his lower lumbar more.     PLAN:   Cont with mobility of his lower lumbar and progress with core and hip stability.  Assess effectiveness of lumbar traction.     SIGNATURE: Ruiz Mcclelland, PT, [place] License #: [id number]  Electronically Signed on 3/27/2024        [address] [place] [address] [place] [address]  [phone]

## 2024-04-04 ENCOUNTER — TREATMENT (OUTPATIENT)
Dept: PHYSICAL THERAPY | Facility: CLINIC | Age: 54
End: 2024-04-04
Payer: OTHER GOVERNMENT

## 2024-04-04 DIAGNOSIS — M54.41 ACUTE RIGHT-SIDED LOW BACK PAIN WITH RIGHT-SIDED SCIATICA: Primary | ICD-10-CM

## 2024-04-04 PROCEDURE — 97110 THERAPEUTIC EXERCISES: CPT | Performed by: PHYSICAL THERAPIST

## 2024-04-04 PROCEDURE — 97140 MANUAL THERAPY 1/> REGIONS: CPT | Performed by: PHYSICAL THERAPIST

## 2024-04-04 NOTE — PROGRESS NOTES
"                                                                Physical Therapy Treatment Note  [address] [place] [address]    Patient: Randell Reddy                                                 Visit Date: 2024  :     1970    Referring practitioner:    Radu Haley,*  Date of Initial Visit:          Type: THERAPY  Noted: 2024    Patient seen for 8 sessions    Visit Diagnoses:    ICD-10-CM ICD-9-CM   1. Acute right-sided low back pain with right-sided sciatica  M54.41 724.2     724.3     SUBJECTIVE     Subjective:  He is says his back has been really hurting him. He says he was hurting all weekend more in his back. He picked up a load of bricks and tried to be careful. If he lay in one spot too long it will hurt. He is still having radicular pain. Today has been the first day it hasn't \"kelly him\". He's driving out of state tomorrow.     PAIN: 5/10 walking in; 0/10 sitting       OBJECTIVE     Objective     Manual Therapy     [id number]  Comments   Percussive IASTM using Powerboost to right piri/lower lumbar at L2 using spade/ball attachment      Right lower lumbar MFR    RLE LAD sustained   Sidelying lumbar roll mob Gentle OP                   Timed Minutes 30     Therapeutic Exercises    56474 Units Comments   Passive piriformis stretch right      Lumbar flexion/right rotation     Passive gentle LTR               Timed Minutes 10     Therapy Education/Self Care [id number]   Education offered today Educated on anatomy of lower lumbar degeneration.   Educated on TLIF vs disc replacement   Medbride Code    Ongoing HEP   Walk with PPT and shorter steps   Timed Minutes        Total Timed Treatment:     40   mins  Total Time of Visit:            40  mins    ASSESSMENT/PLAN     GOALS:  Goals                                                  Progress Note due by 4/3/24                                                              Recert due by 2924   STG by: 4 weeks Comments Date Status " Progress Note, Physician


History of Present Illness: 





Pt states he feels well. Denies abd pain. NGT in place. Ambulating. Reports BM 

today. No specific complaints.





- Current Medication List


Current Medications: 


Active Medications





Acetaminophen (Ofirmev Injection -)  1,000 mg IVPB Q6H PRN


   PRN Reason: FEVER


   Last Admin: 08/01/19 20:20 Dose:  1,000 mg


Heparin Sodium (Porcine) (Heparin -)  5,000 unit SQ Q8H-IV CARLO


   Last Admin: 08/04/19 09:08 Dose:  5,000 unit


Hydromorphone HCl (Hydromorphone 10 Mg/50 Ml-Ns)  10 mg PCA PCA CARLO; Protocol


   Stop: 08/08/19 17:14


   Last Admin: 08/01/19 20:30 Dose:  10 mg


Metronidazole (Flagyl 500mg Premixed Ivpb -)  500 mg in 100 mls @ 100 mls/hr 

IVPB Q8H-IV CARLO


   Last Admin: 08/04/19 09:07 Dose:  100 mls/hr


Piperacillin Sod/Tazobactam (Sod 3.375 gm/ Dextrose)  50 mls @ 100 mls/hr IVPB 

Q8H-IV CARLO; Protocol


   Last Admin: 08/04/19 10:23 Dose:  100 mls/hr


Fluconazole (Diflucan 100 Mg/Ns Premixed Ivpb -)  50 mls @ 50 mls/hr IVPB DAILY 

CARLO


   Last Admin: 08/04/19 12:12 Dose:  50 mls/hr


Potassium Chloride/Dextrose/Sod Cl (D5-1/2ns+20 Meq Kcl -)  20 meq in 1,000 mls 

@ 100 mls/hr IV ASDIR [facility]


Nicotine (Nicoderm Patch -)  14 mg TD DAILY CARLO


   Last Admin: 08/04/19 15:25 Dose:  14 mg


Ondansetron HCl (Zofran Injection)  4 mg IVPUSH Q8H PRN


   PRN Reason: NAUSEA


Pantoprazole Sodium (Protonix Iv)  40 mg IVPB BID [facility]


   Last Admin: 08/04/19 09:44 Dose:  40 mg











- Objective


Vital Signs: 


 Vital Signs











Temperature  97.9 F   08/04/19 09:00


 


Pulse Rate  68   08/04/19 09:00


 


Respiratory Rate  18   08/04/19 09:00


 


Blood Pressure  135/80   08/04/19 09:00


 


O2 Sat by Pulse Oximetry (%)  94 L  08/04/19 09:00











Constitutional: Yes: No Distress, Calm


Cardiovascular: Yes: Regular Rate and Rhythm


Respiratory: Yes: Regular


Gastrointestinal: Yes: Normal Bowel Sounds, Soft, Other (+NGT)


Genitourinary: Yes: WNL


Musculoskeletal: Yes: WNL


Extremities: Yes: WNL


Integumentary: Yes: WNL


Neurological: Yes: Alert, Oriented


Labs: 


 CBC, BMP





 08/04/19 07:45 





 08/04/19 07:45 





 INR, PTT











INR  0.94  (0.83-1.09)   08/01/19  11:56    








 Microbiology





08/01/19 14:56   Blood - Peripheral Venous   Blood Culture - Preliminary


                            NO GROWTH OBTAINED AFTER 72 HOURS, INCUBATION TO 

CONTINUE


                            FOR 2 DAYS.


08/01/19 15:06   Blood - Peripheral Venous   Blood Culture - Preliminary


                            NO GROWTH OBTAINED AFTER 72 HOURS, INCUBATION TO 

CONTINUE


                            FOR 2 DAYS.


08/01/19 18:50   Peritoneal Cavity Swab   Gram Stain - Final


08/01/19 18:50   Peritoneal Cavity Swab   Wound Culture - Final


                            Yeast Like Organism


                            Alpha Hemolytic Streptococcus











Problem List





- Problems


(1) HTN (hypertension)


Code(s): I10 - ESSENTIAL (PRIMARY) HYPERTENSION   


Qualifiers: 


   Hypertension type: essential hypertension   Qualified Code(s): I10 - 

Essential (primary) hypertension   





(2) NSAID induced gastritis


Code(s): K29.60 - OTHER GASTRITIS WITHOUT BLEEDING; T39.395A - ADVERSE EFFECT 

OF NONSTEROIDAL ANTI-INFLAMMATORY DRUGS, INIT   





(3) Perforated abdominal viscus


Code(s): GHB9247 -    





(4) Perforated gastric ulcer


Code(s): K25.5 - CHRONIC OR UNSPECIFIED GASTRIC ULCER WITH PERFORATION   





Assessment/Plan





Perforated abd viscus/nsaid-induced gastric ulcer s/p Ex-lap/Arturo patch POD#3





-- pt stable, afebrile, leukocytosis resolved, lactic acid level normal  now


-- continue IV antibiotics


-- small bowel series planned for Tuesday


-- surgery following


continue monitor   Improve geovany thoracolumbar mobility  stiff 3/4 ongoing   Improve  right hip ext to 10 deg               LTG by: 8 weeks       Reports no radicular symptoms in right LE for a week except for occasional minor twinges relieved with exs.   still having radicular symptoms 3/27 ongoing   Able to walk 20 min without exacerbation of pain  Can walk or stand 8-10 min before needing to sit 3/4 ongoing   Reports pain no greater than 2-3/10 when it does occur.  5/10 walking in today 4/4 ongoing   Improve Oswestry to 10 or better 15 3/4 ongoing   Independent with HEP for flexibility and core/hip stability.  emphasis on flexibility 3/4 ongoing     Assessment/Plan     ASSESSMENT:   His pain was much more flared today. Not sure if it was the lumbar traction of the activity he did after PT or just bad luck. He felt better after PT today.     PLAN:   Cont with mobility of his lower lumbar and progress with core and hip stability.  Avoid lumbar mechanical traction for now.    SIGNATURE: Ruiz Mcclelland, [place], [place] License #: [id number]  Electronically Signed on 4/4/2024        [address] [place] [address] [place]. [address]  [phone]

## 2024-04-08 ENCOUNTER — TREATMENT (OUTPATIENT)
Dept: PHYSICAL THERAPY | Facility: CLINIC | Age: 54
End: 2024-04-08
Payer: OTHER GOVERNMENT

## 2024-04-08 DIAGNOSIS — M54.41 ACUTE RIGHT-SIDED LOW BACK PAIN WITH RIGHT-SIDED SCIATICA: Primary | ICD-10-CM

## 2024-04-08 PROCEDURE — 97140 MANUAL THERAPY 1/> REGIONS: CPT | Performed by: PHYSICAL THERAPIST

## 2024-04-08 PROCEDURE — 97110 THERAPEUTIC EXERCISES: CPT | Performed by: PHYSICAL THERAPIST

## 2024-04-08 NOTE — PROGRESS NOTES
Physical Therapy Treatment Note and 30 Day Progress Note  [address] [place] [address]    Patient: Randell IZAGUIRRE Link                                                 Visit Date: 2024  :     1970    Referring practitioner:    Radu Haley,*  Date of Initial Visit:          Type: THERAPY  Noted: 2024    Patient seen for 9 sessions    Visit Diagnoses:    ICD-10-CM ICD-9-CM   1. Acute right-sided low back pain with right-sided sciatica  M54.41 724.2     724.3     SUBJECTIVE     Subjective:  He is says his back is still really sore and tight. He still has to sit due to sciatica if he's on his feet too long.     PAIN: 5/10 walking in; 0/10 sitting       OBJECTIVE     Objective     Manual Therapy     [id number]  Comments   Percussive IASTM using Powerboost to right piri/lower lumbar at L2 using spade/ball attachment      Right lower lumbar MFR    RLE LAD sustained                       Timed Minutes 30     Therapeutic Exercises    42361 Units Comments   Passive piriformis stretch right           Passive gentle LTR               Timed Minutes 10     Therapy Education/Self Care [id number]   Education offered today Educated on anatomy of lower lumbar degeneration.   Educated on TLIF vs disc replacement   Medbride Code    Ongoing HEP   Walk with PPT and shorter steps   Timed Minutes        Total Timed Treatment:     40   mins  Total Time of Visit:            40  mins    ASSESSMENT/PLAN     GOALS:  Goals                                                  Progress Note due by 24                                                              Recert due by 2924   STG by: 4 weeks Comments Date Status   Improve geovany thoracolumbar mobility  stiff  ongoing   Improve  right hip ext to 10 deg               LTG by: 8 weeks       Reports no radicular symptoms in right LE for a week except for occasional minor twinges relieved with exs.   still  having radicular symptoms if he stands more than a few minurs 4/8 ongoing   Able to walk 20 min without exacerbation of pain  Can walk or stand 8-10 min before needing to sit 4/8 ongoing   Reports pain no greater than 2-3/10 when it does occur.  5/10 walking in today 4/8 ongoing   Improve Oswestry to 10 or better 15 at eval  15 on 4/8 4/8 ongoing   Independent with HEP for flexibility and core/hip stability.  emphasis on flexibility 4/8 ongoing     Assessment & Plan       Assessment  Impairments: abnormal muscle firing, abnormal muscle tone, abnormal or restricted ROM, activity intolerance, impaired physical strength, lacks appropriate home exercise program and pain with function   Functional limitations: lifting, walking, uncomfortable because of pain, sitting and standing   Prognosis: good    Plan  Therapy options: will be seen for skilled therapy services  Planned modality interventions: dry needling, low level laser therapy, TENS and traction  Planned therapy interventions: abdominal trunk stabilization, flexibility, functional ROM exercises, home exercise program, joint mobilization, manual therapy, motor coordination training, neuromuscular re-education, postural training, soft tissue mobilization, spinal/joint mobilization, strengthening, stretching and therapeutic activities  Frequency: 2x week  Duration in weeks: 8  Treatment plan discussed with: patient         ASSESSMENT:   His pain isn't as bad as it was his last visit but still pretty guarded. He is still struggling with radicular pain with standing. He may ultimately need less conservative measures. No goals have been met at this point.     PLAN:   Cont with mobility of his lower lumbar and progress with core and hip stability.  Avoid lumbar mechanical traction for now.    SIGNATURE: Ruiz Mcclelland, PT, [place] License #: [id number]  Electronically Signed on 4/8/2024        [address] [place] [address] [place] [address]  [phone]

## 2024-04-15 ENCOUNTER — TREATMENT (OUTPATIENT)
Dept: PHYSICAL THERAPY | Facility: CLINIC | Age: 54
End: 2024-04-15
Payer: OTHER GOVERNMENT

## 2024-04-15 DIAGNOSIS — M54.41 ACUTE RIGHT-SIDED LOW BACK PAIN WITH RIGHT-SIDED SCIATICA: Primary | ICD-10-CM

## 2024-04-15 PROCEDURE — 97110 THERAPEUTIC EXERCISES: CPT | Performed by: PHYSICAL THERAPIST

## 2024-04-15 PROCEDURE — 97140 MANUAL THERAPY 1/> REGIONS: CPT | Performed by: PHYSICAL THERAPIST

## 2024-04-15 NOTE — PROGRESS NOTES
Physical Therapy [facility] Letter  [address] [place] [address]    Patient: Randell Reddy                                                 Visit Date: 2024  :     1970    #:    [facility] ID#: [id number]   Referring practitioner:    Radu Haley,*  Date of Initial Visit:          Type: THERAPY  Noted: 2024    Patient seen for 9 sessions    Visit Diagnoses:    ICD-10-CM ICD-9-CM   1. Acute right-sided low back pain with right-sided sciatica  M54.41 724.2     724.3       To whom it may concern,     Mr. Reddy has been under my care since 24 for the diagnosis noted aboveI am the physical therapist that has been working with Chi for his back pain and right sided sciatica     I have 30 years of experience as a PT with a wide breadth of experience in all areas  I have reviewed the imaging of Chi' spine as well as assessed him myself on the primary etiology of his pain. We have also discussed his service and the activities involved with this.     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 His imaging shows severe degeneration of the disc at L5/S1 with foraminal stenosis. This is consistent with his radicular symptoms as well.     Our emphasis has been on improving mobility through his hips and spine while also progressing core and hip stability. He may ultimately need less conservative measures as any time he is on his feet for more than a few minutes, his right leg goes numb.    SIGNATURE: Ruiz Mcclelland, PT, [place] License #: [id number]  Electronically Signed on 4/15/2024        [address]  [place]. [address]  [phone]

## 2024-04-15 NOTE — PROGRESS NOTES
Physical Therapy [facility] Letter  [address] [place] [address]    Patient: Randell Reddy                                                 Visit Date: 4/15/2024  :     1970    #:    [facility] ID#: [id number]   Referring practitioner:    Radu Haley,*  Date of Initial Visit:          Type: THERAPY  Noted: 2024    Patient seen for 10 sessions    Visit Diagnoses:    ICD-10-CM ICD-9-CM   1. Acute right-sided low back pain with right-sided sciatica  M54.41 724.2     724.3       To whom it may concern,     Mr. Reddy has been under my care since 24 for the diagnosis noted aboveI am the physical therapist that has been working with Chi for his back pain and right sided sciatica     I have 30 years of experience as a PT with a wide breadth of experience in all areas  I have reviewed the imaging of Chi' spine as well as assessed him myself on the primary etiology of his pain. We have also discussed his service and the activities involved with this.     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 His imaging shows severe degeneration of the disc at L5/S1 with foraminal stenosis. This is consistent with his radicular symptoms as well.     Our emphasis has been on improving mobility through his hips and spine while also progressing core and hip stability. He may ultimately need less conservative measures as any time he is on his feet for more than a few minutes, his right leg goes numb.    SIGNATURE: Ruiz Mcclelland, PT, [place] License #: [id number]  Electronically Signed on 4/15/2024        [address] [place] [address]  [phone]

## 2024-04-15 NOTE — PROGRESS NOTES
Physical Therapy Treatment Note  [address] [place] [address]    Patient: Randell Reddy                                                 Visit Date: 4/15/2024  :     1970    Referring practitioner:    Radu Haley,*  Date of Initial Visit:          Type: THERAPY  Noted: 2024    Patient seen for 10 sessions    Visit Diagnoses:    ICD-10-CM ICD-9-CM   1. Acute right-sided low back pain with right-sided sciatica  M54.41 724.2     724.3     SUBJECTIVE     Subjective:  He has been trying to be easy on his back but he had to do some more patio blocks. [facility] because they weren't aware of his sciatica. He is still having the numbness in his right leg.     PAIN: 5/10 walking in; 0/10 sitting       OBJECTIVE     Objective     Manual Therapy     [id number]  Comments   Percussive IASTM using Powerboost to right piri/lower lumbar at L2 using spade/ball attachment      Right lower lumbar MFR    RLE LAD sustained                       Timed Minutes 30     Therapeutic Exercises    89596 Units Comments   Passive piriformis stretch right           Passive gentle LTR     Lumbar flexion/right rotation stretch          Timed Minutes 10     Therapy Education/Self Care [id number]   Education offered today Educated on anatomy of lower lumbar degeneration.   Educated on TLIF vs disc replacement   Medbride Code    Ongoing HEP   Walk with PPT and shorter steps   Timed Minutes        Total Timed Treatment:     40   mins  Total Time of Visit:            40  mins    ASSESSMENT/PLAN     GOALS:  Goals                                                  Progress Note due by 24                                                              Recert due by 2924   STG by: 4 weeks Comments Date Status   Improve geovany thoracolumbar mobility  stiff  ongoing   Improve  right hip ext to 10 deg               LTG by: 8 weeks        Reports no radicular symptoms in right LE for a week except for occasional minor twinges relieved with exs.   still having radicular symptoms if he stands more than a few minurs 4/8 ongoing   Able to walk 20 min without exacerbation of pain  Can walk or stand 8-10 min before needing to sit 4/8 ongoing   Reports pain no greater than 2-3/10 when it does occur.  5/10 walking in today 4/15 ongoing   Improve Oswestry to 10 or better 15 at eval  15 on 4/8 4/8 ongoing   Independent with HEP for flexibility and core/hip stability.  emphasis on flexibility 4/8 ongoing     Assessment/Plan     ASSESSMENT:   His pain is still consistent with the stenosis of his lower lumbar with the sciatica.     PLAN:   Cont with mobility of his lower lumbar and progress with core and hip stability.  Avoid lumbar mechanical traction for now.    SIGNATURE: Ruiz Mcclelland, PT, [place] License #: [id number]  Electronically Signed on 4/15/2024        [address] [place] [address]. [address]  [phone]

## 2024-04-18 ENCOUNTER — TREATMENT (OUTPATIENT)
Dept: PHYSICAL THERAPY | Facility: CLINIC | Age: 54
End: 2024-04-18
Payer: OTHER GOVERNMENT

## 2024-04-18 DIAGNOSIS — M54.41 ACUTE RIGHT-SIDED LOW BACK PAIN WITH RIGHT-SIDED SCIATICA: Primary | ICD-10-CM

## 2024-04-18 PROCEDURE — 97140 MANUAL THERAPY 1/> REGIONS: CPT | Performed by: PHYSICAL THERAPIST

## 2024-04-18 PROCEDURE — 97110 THERAPEUTIC EXERCISES: CPT | Performed by: PHYSICAL THERAPIST

## 2024-04-18 NOTE — PROGRESS NOTES
Physical Therapy Treatment Note  [address] [place] [address]    Patient: Randell Reddy                                                 Visit Date: 2024  :     1970    Referring practitioner:    Radu Haley,*  Date of Initial Visit:          Type: THERAPY  Noted: 2024    Patient seen for 11 sessions    Visit Diagnoses:    ICD-10-CM ICD-9-CM   1. Acute right-sided low back pain with right-sided sciatica  M54.41 724.2     724.3     SUBJECTIVE     Subjective:  He says his leg is sore today.     PAIN: 5/10 walking in; 0/10 sitting       OBJECTIVE     Objective     Manual Therapy     [id number]  Comments   Percussive IASTM using Powerboost to right piri/lower lumbar at L2 using spade/ball attachment      Right lower lumbar MFR    RLE LAD sustained       Timed Minutes 30     Therapeutic Exercises    88543 Units Comments   Passive piriformis stretch right           Passive gentle LTR     Lumbar flexion/right rotation stretch          Timed Minutes 10     Therapy Education/Self Care [id number]   Education offered today Educated on anatomy of lower lumbar degeneration.   Educated on TLIF vs disc replacement   Medbride Code    Ongoing HEP   Walk with PPT and shorter steps   Timed Minutes        Total Timed Treatment:     40   mins  Total Time of Visit:            40  mins    ASSESSMENT/PLAN     GOALS:  Goals                                                  Progress Note due by 24                                                              Recert due by 2924   STG by: 4 weeks Comments Date Status   Improve geovany thoracolumbar mobility  stiff  ongoing   Improve  right hip ext to 10 deg               LTG by: 8 weeks       Reports no radicular symptoms in right LE for a week except for occasional minor twinges relieved with exs.   still having radicular symptoms if he stands more than a few minurs  ongoing   Able to walk  20 min without exacerbation of pain  Can walk or stand 8-10 min before needing to sit 4/8 ongoing   Reports pain no greater than 2-3/10 when it does occur.  4/10 walking in today 4/19 ongoing   Improve Oswestry to 10 or better 15 at eval  15 on 4/8 4/8 ongoing   Independent with HEP for flexibility and core/hip stability.  emphasis on flexibility 4/8 ongoing     Assessment/Plan     ASSESSMENT:   His pain is still consistent. He may ultimately need less conservative measures.     PLAN:   Cont with mobility of his lower lumbar and progress with core and hip stability.  Avoid lumbar mechanical traction for now.    SIGNATURE: Ruiz Mcclelland, PT, [place] License #: [id number]  Electronically Signed on 4/18/2024        [address] [place] [address]  [phone]

## 2024-05-20 ENCOUNTER — TREATMENT (OUTPATIENT)
Dept: PHYSICAL THERAPY | Facility: CLINIC | Age: 54
End: 2024-05-20
Payer: OTHER GOVERNMENT

## 2024-05-20 DIAGNOSIS — M54.41 ACUTE RIGHT-SIDED LOW BACK PAIN WITH RIGHT-SIDED SCIATICA: Primary | ICD-10-CM

## 2024-05-20 PROCEDURE — 97140 MANUAL THERAPY 1/> REGIONS: CPT | Performed by: PHYSICAL THERAPIST

## 2024-05-20 NOTE — PROGRESS NOTES
Physical Therapy Treatment Note, 30 Day Progress Note, and 90 Day Recertification Note  [address] [place] [address]    Patient: Randell Reddy                                                 Visit Date: 2024  :     1970    Referring practitioner:    Radu Haley,*  Date of Initial Visit:          Type: THERAPY  Noted: 2024    Patient seen for 12 sessions    Visit Diagnoses:    ICD-10-CM ICD-9-CM   1. Acute right-sided low back pain with right-sided sciatica  M54.41 724.2     724.3     SUBJECTIVE     Subjective:  He says that now his left leg has been hurting more frequently along with his right.     PAIN: 5/10 walking in; 0/10 sitting       OBJECTIVE     Objective     Manual Therapy     [id number]  Comments   Percussive IASTM using Powerboost to right piri/lower lumbar at L2 using spade/ball attachment      Right lower lumbar MFR    RLE LAD sustained       Timed Minutes 30     Therapeutic Exercises    27066 Units Comments   Passive piriformis stretch right           Passive gentle LTR     Lumbar flexion/right rotation stretch          Timed Minutes 10     Therapy Education/Self Care [id number]   Education offered today Educated on anatomy of lower lumbar degeneration.   Educated on TLIF vs disc replacement   [employer] Code    Ongoing HEP   Walk with PPT and shorter steps   Timed Minutes        Total Timed Treatment:     40   mins  Total Time of Visit:            40  mins    ASSESSMENT/PLAN     GOALS:  Goals                                                  Progress Note due by 24                                                              Recert due by 2924   STG by: 4 weeks Comments Date Status   Improve geovany thoracolumbar mobility  stiff  ongoing   Improve  right hip ext to 10 deg               LTG by: 8 weeks       Reports no radicular symptoms in right LE for a week except for occasional minor twinges  relieved with exs.   still having radicular symptoms if he stands more than a few minurs 4/8 ongoing   Able to walk 20 min without exacerbation of pain  Can walk or stand 8-10 min before needing to sit 4/8 ongoing   Reports pain no greater than 2-3/10 when it does occur.  4/10 walking in today 4/19 ongoing   Improve Oswestry to 10 or better 15 at eval  15 on 4/8  16 on 5/20 4/8 ongoing   Independent with HEP for flexibility and core/hip stability.  emphasis on flexibility 4/8 ongoing     Assessment & Plan       Assessment  Impairments: abnormal muscle firing, abnormal muscle tone, abnormal or restricted ROM, activity intolerance, impaired physical strength, lacks appropriate home exercise program and pain with function   Functional limitations: lifting, walking, uncomfortable because of pain, sitting and standing   Prognosis: good    Plan  Therapy options: will be seen for skilled therapy services  Planned modality interventions: dry needling, low level laser therapy, TENS and traction  Planned therapy interventions: abdominal trunk stabilization, flexibility, functional ROM exercises, home exercise program, joint mobilization, manual therapy, motor coordination training, neuromuscular re-education, postural training, soft tissue mobilization, spinal/joint mobilization, strengthening, stretching and therapeutic activities  Frequency: 2x week  Duration in weeks: 6  Treatment plan discussed with: patient       ASSESSMENT:   His pain is still consistent. When he stand he has radicular pain, mostly in his right but his left has also started showing radicular. His L5/S1 has degenerated quite a bit. At some point, he may need less conservative measures. Our emphasis has been on mobilizations of his thoracic and lumbar with stretching to try to decompress his lumbar. We have tried various forms of traction which have not given long lasting effects.     PLAN:   Cont with mobility of his lower lumbar and progress with core  and hip stability.  Avoid lumbar mechanical traction for now.    SIGNATURE: Ruiz Mcclelland PT, [place] License #: [id number]  Electronically Signed on 5/20/2024    Clinical Progress: no change  Home Program Compliance: Yes  Progress toward previous goals: Partially Met      90 Day Recertification  Certification Period: 5/20/2024 through 8/17/2024  I certify that the therapy services are furnished while this patient is under my care.  The services outlined above are required by this patient, and will be reviewed every 90 days.     PHYSICIAN: Radu Haley MD (NPI: [id number])    Signature:___________________________________________DATE: _________    Please sign and return via fax to [phone].   Thank you so much for letting us work with Randell. I appreciate your letting us work with your patients. If you have any questions or concerns, please don't hesitate to contact me.              [address] [id number]  [phone]

## 2024-05-24 ENCOUNTER — TREATMENT (OUTPATIENT)
Dept: PHYSICAL THERAPY | Facility: CLINIC | Age: 54
End: 2024-05-24
Payer: OTHER GOVERNMENT

## 2024-05-24 DIAGNOSIS — M54.41 ACUTE RIGHT-SIDED LOW BACK PAIN WITH RIGHT-SIDED SCIATICA: Primary | ICD-10-CM

## 2024-05-24 PROCEDURE — 97110 THERAPEUTIC EXERCISES: CPT | Performed by: PHYSICAL THERAPIST

## 2024-05-24 PROCEDURE — 97140 MANUAL THERAPY 1/> REGIONS: CPT | Performed by: PHYSICAL THERAPIST

## 2024-05-26 NOTE — PROGRESS NOTES
Physical Therapy Treatment Note  [address] [place] [address]    Patient: Randell Reddy                                                 Visit Date: 2024  :     1970    Referring practitioner:    Radu Haley,*  Date of Initial Visit:          Type: THERAPY  Noted: 2024    Patient seen for 13 sessions    Visit Diagnoses:    ICD-10-CM ICD-9-CM   1. Acute right-sided low back pain with right-sided sciatica  M54.41 724.2     724.3     SUBJECTIVE     Subjective:  He reports not much change. He has to leave early today.     PAIN: 5/10 walking in; 0/10 sitting       OBJECTIVE     Objective     Manual Therapy     [id number]  Comments   Percussive IASTM using Powerboost to right piri/lower lumbar at L2 using spade/ball attachment      Right lower lumbar MFR    RLE LAD sustained       Timed Minutes 20     Therapeutic Exercises    29390 Units Comments   Passive piriformis stretch right           Passive gentle LTR     Lumbar flexion/right rotation stretch          Timed Minutes 10     Therapy Education/Self Care [id number]   Education offered today Educated on anatomy of lower lumbar degeneration.   Educated on TLIF vs disc replacement   Medbr[employer] Code    Ongoing HEP   Walk with PPT and shorter steps   Timed Minutes        Total Timed Treatment:     30   mins  Total Time of Visit:            30  mins    ASSESSMENT/PLAN     GOALS:  Goals                                                  Progress Note due by 24                                                              Recert due by 2924   STG by: 4 weeks Comments Date Status   Improve geovany thoracolumbar mobility  stiff  ongoing   Improve  right hip ext to 10 deg               LTG by: 8 weeks       Reports no radicular symptoms in right LE for a week except for occasional minor twinges relieved with exs.   still having radicular symptoms if he stands more than a few minurs  4/8 ongoing   Able to walk 20 min without exacerbation of pain  Can walk or stand 8-10 min before needing to sit 4/8 ongoing   Reports pain no greater than 2-3/10 when it does occur.  4/10 walking in today 4/19 ongoing   Improve Oswestry to 10 or better 15 at eval  15 on 4/8  16 on 5/20 4/8 ongoing   Independent with HEP for flexibility and core/hip stability.  emphasis on flexibility 4/8 ongoing     Assessment/Plan     ASSESSMENT:   We are continue to try to work on mobility through his lumbar spine. He may ultimately need less conservative measures.     PLAN:   Cont with mobility of his lower lumbar and progress with core and hip stability.  Avoid lumbar mechanical traction for now.    SIGNATURE: Ruiz Mcclelland, PT, [place] License #: [id number]  Electronically Signed on 5/26/2024            [facility]Brad Juarez. [address]  [phone]

## 2024-06-18 ENCOUNTER — TELEPHONE (OUTPATIENT)
Dept: NEUROSURGERY | Facility: CLINIC | Age: 54
End: 2024-06-18

## 2024-06-18 NOTE — TELEPHONE ENCOUNTER
Caller: Randell Reddy    Relationship to patient: Self    Best call back number: [phone]    Type of visit: FOLLOW UP EXT    If rescheduling, when is the original appointment: 6/19/24     Additional notes: PATIENT IS UNABLE TO BE SEEN TOMORROW, AS HIS DAUGHTER HAS GONE INTO LABOR AND HE WILL BE GONE. HE OPTED TO RESCHEDULE APPT FOR DR GUARDADO'S NEXT AVAILABLE AND BE PLACED ON WAIT LIST. I ADVISED I WOULD ALSO NOTIFY CLINICAL STAFF IN CASE THEY WOULD LIKE HIM TO BE SEEN SOONER. IF SO, PLEASE CALL PATIENT TO ADVISE.

## 2024-08-20 ENCOUNTER — TELEPHONE (OUTPATIENT)
Dept: NEUROSURGERY | Facility: CLINIC | Age: 54
End: 2024-08-20
Payer: OTHER GOVERNMENT

## 2024-08-20 NOTE — TELEPHONE ENCOUNTER
Caller: JACKI GREGORY    Relationship to patient: SELF    Best call back number: [phone]      Type of visit: PT IS NEEDING AN APPT FOR AN EVAL FOR DISABILITY, HE SAYS HE HAS A 60 DAY WINDOW TO GET THIS TAKEN CARE OF.  HE IS SCHEDULED 9-23-24 FOR A FOLLOW UP WITH DR GUARDADO.  HE IS WONDERING IF HE CAN USE THIS APPT TO HAVE THIS EVAL COMPLETED.  PLEASE ADVISE.      If rescheduling, when is the original appointment: 9-23-24

## 2024-08-21 NOTE — TELEPHONE ENCOUNTER
Spoke with patient and he is wanting to address his back issues at his 9/23 appt. 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 He voiced understanding and just needs eval done for his back. States he just completed over 15 sessions of PT and will bring MRI disc with him (current scan) and his EMG report as well. I told him I cannot guarantee that will happen but to bring those items with him. He expressed understanding

## 2024-09-23 ENCOUNTER — OFFICE VISIT (OUTPATIENT)
Dept: NEUROSURGERY | Facility: CLINIC | Age: 54
End: 2024-09-23
Payer: OTHER GOVERNMENT

## 2024-09-23 VITALS — HEIGHT: 74 IN | WEIGHT: 315 LBS | BODY MASS INDEX: 40.43 KG/M2

## 2024-09-23 DIAGNOSIS — M54.2 CERVICALGIA: Primary | ICD-10-CM

## 2024-09-23 DIAGNOSIS — M54.50 LUMBAR PAIN: ICD-10-CM

## 2024-09-23 DIAGNOSIS — M54.16 LUMBAR RADICULOPATHY: ICD-10-CM

## 2024-09-23 DIAGNOSIS — G56.00 CARPAL TUNNEL SYNDROME, UNSPECIFIED LATERALITY: ICD-10-CM

## 2024-09-23 DIAGNOSIS — M48.061 SPINAL STENOSIS, LUMBAR REGION, WITHOUT NEUROGENIC CLAUDICATION: ICD-10-CM

## 2024-09-23 DIAGNOSIS — E66.01 CLASS 3 SEVERE OBESITY DUE TO EXCESS CALORIES WITH SERIOUS COMORBIDITY AND BODY MASS INDEX (BMI) OF 40.0 TO 44.9 IN ADULT: ICD-10-CM

## 2024-09-23 PROCEDURE — 99214 OFFICE O/P EST MOD 30 MIN: CPT | Performed by: NEUROLOGICAL SURGERY

## 2024-10-07 ENCOUNTER — NURSE TRIAGE (OUTPATIENT)
Dept: CALL CENTER | Facility: HOSPITAL | Age: 54
End: 2024-10-07
Payer: COMMERCIAL

## 2024-10-07 NOTE — TELEPHONE ENCOUNTER
"Had a missed call about an appt on 10/10. Will not be in town that day. Would appreciate call to reschedule.     Reason for Disposition   Requesting regular office appointment    Additional Information   Negative: [1] Caller is not with the adult (patient) AND [2] reporting urgent symptoms   Negative: Lab result questions   Negative: Medication questions   Negative: Caller can't be reached by phone   Negative: Caller has already spoken to PCP or another triager   Negative: RN needs further essential information from caller in order to complete triage   Negative: [1] Caller requesting NON-URGENT health information AND [2] PCP's office is the best resource   Negative: Health Information question, no triage required and triager able to answer question   Negative: General information question, no triage required and triager able to answer question    Answer Assessment - Initial Assessment Questions  1. REASON FOR CALL or QUESTION: \"What is your reason for calling today?\" or \"How can I best help you?\" or \"What question do you have that I can help answer?\"      Had a missed call about an appt on 10/10. Will not be in town that day. Would appreciate call to reschedule.    Protocols used: Information Only Call - No Triage-ADULT-    "

## 2024-10-28 ENCOUNTER — OFFICE VISIT (OUTPATIENT)
Dept: NEUROSURGERY | Facility: CLINIC | Age: 54
End: 2024-10-28
Payer: OTHER GOVERNMENT

## 2024-10-28 ENCOUNTER — HOSPITAL ENCOUNTER (OUTPATIENT)
Dept: GENERAL RADIOLOGY | Facility: HOSPITAL | Age: 54
Discharge: HOME | End: 2024-10-28
Admitting: NEUROLOGICAL SURGERY
Payer: OTHER GOVERNMENT

## 2024-10-28 VITALS — WEIGHT: 315 LBS | BODY MASS INDEX: 40.43 KG/M2 | HEIGHT: 74 IN

## 2024-10-28 DIAGNOSIS — M48.061 SPINAL STENOSIS, LUMBAR REGION, WITHOUT NEUROGENIC CLAUDICATION: ICD-10-CM

## 2024-10-28 DIAGNOSIS — E66.813 CLASS 3 SEVERE OBESITY DUE TO EXCESS CALORIES WITH SERIOUS COMORBIDITY AND BODY MASS INDEX (BMI) OF 40.0 TO 44.9 IN ADULT: ICD-10-CM

## 2024-10-28 DIAGNOSIS — E66.01 CLASS 3 SEVERE OBESITY DUE TO EXCESS CALORIES WITH SERIOUS COMORBIDITY AND BODY MASS INDEX (BMI) OF 40.0 TO 44.9 IN ADULT: ICD-10-CM

## 2024-10-28 DIAGNOSIS — M48.061 SPINAL STENOSIS, LUMBAR REGION, WITHOUT NEUROGENIC CLAUDICATION: Primary | ICD-10-CM

## 2024-10-28 PROBLEM — M47.27 LUMBOSACRAL SPONDYLOSIS WITH RADICULOPATHY: Status: ACTIVE | Noted: 2024-10-28

## 2024-10-28 PROCEDURE — 99215 OFFICE O/P EST HI 40 MIN: CPT | Performed by: NEUROLOGICAL SURGERY

## 2024-10-28 PROCEDURE — 72082 X-RAY EXAM ENTIRE SPI 2/3 VW: CPT

## 2024-10-28 RX ORDER — CHLORHEXIDINE GLUCONATE ORAL RINSE 1.2 MG/ML
15 SOLUTION DENTAL EVERY 12 HOURS SCHEDULED
OUTPATIENT
Start: 2024-10-28

## 2024-10-28 NOTE — PATIENT INSTRUCTIONS
Conservative care  L4/5 decompression/lamiectomy/discectomy  L4/5 Minimally invasive fusion  L4/5 and L5/S1 fusion.

## 2024-10-28 NOTE — PROGRESS NOTES
Primary Care Provider: Alphonso Palmer MD    Chief Complaint:   Chief Complaint   Patient presents with    Neck Pain     Patient here for follow up with MRI and EMG/NCS for review.     Neck Pain   Associated symptoms include numbness.       HISTORY/ HPI:  Randell Reddy is a 54 y.o.  male who present today with his wife with a complaint of left hand numbness and tingling, predominantly digit 1 and 2.  History of lumbar discectomy at L4-5 and open laminectomy at L5-S1, 1998 and 2008 at [facility].      History of Present Illness    The patient presents for evaluation of lower back pain.    He reports experiencing lower back pain for the past month or two, accompanied by itching, numbness, and tingling sensations in his feet. His left calf has significantly reduced in size, while his right calf appears larger. He does not require the use of a cane or walker for mobility.    He has undergone two laminectomies, one in 1998 and another in 2008. He has completed 16 weeks of physical therapy for his lower back.    He is currently taking oxycodone 5 mg as needed, approximately 4 to 5 pills per week, for his back pain and kidney stones. He reports no issues with bowel or bladder control.    SOCIAL HISTORY  The patient works as a contractor oversight. He denies smoking or drinking.     Oswestry Disability Index Lumbar = 24%  SCORE INTERPRETATION OF THE OSWESTRY LBP DISABILITY QUESTIONNAIRE: 20-40% Moderate disability This group experiences more pain and problems with sitting, lifting, and standing. Travel and social life are more difficult and they may well be off work. Personal care, sexual activity, and sleeping are not grossly affected, and the back condition can usually be managed by conservative means.      Score   Pain Intensity Very mild pain-1   Personal Care Look after myself without pain-0   Lifting Can lift heavy weights with extra pain-1   Walking Pain prevents > 1/4 mile-2   Sitting Sit in  "\"favorite\" chair as long as I like-1   Standing Pain limits standing to < 1/2 hr-3   Sleeping Occasionally disturbed-1   Sex Life (if applicable) Sex causes extra pain-2   Social Life Social life normal, but increases pain-1   Traveling Travel gives me extra pain-1   (Trisha et al, 1980)      ROS:  Review of Systems   Constitutional: Negative.    HENT: Negative.     Eyes: Negative.    Respiratory:  Positive for apnea.    Cardiovascular: Negative.    Gastrointestinal: Negative.    Endocrine: Negative.    Genitourinary:  Positive for flank pain.   Musculoskeletal:  Positive for back pain and neck pain.   Skin: Negative.    Allergic/Immunologic: Negative.    Neurological:  Positive for numbness.   Hematological: Negative.    Psychiatric/Behavioral: Negative.     All other systems reviewed and are negative.    Past Medical History:   Diagnosis Date    Abnormal LFTs     Bipolar disorder     Cervical disc disorder 1996    COVID-19     CTS (carpal tunnel syndrome) 2024    Diabetes mellitus     Dysphagia     Elevated cholesterol     Hypertension     Injury of back     Kidney stone     Low back pain 1996    Lumbosacral disc disease 1996    Sleep apnea     cpap     Past Surgical History:   Procedure Laterality Date    COLONOSCOPY N/A 08/21/2023    Procedure: COLONOSCOPY WITH ANESTHESIA;  Surgeon: Jarred De Oliveira MD;  Location: [facility] ENDOSCOPY;  Service: Gastroenterology;  Laterality: N/A;  Pre: Encounter for screening for malignant neoplasm of colon;  Post:Polyp, diverticulosis;  Alphonso Palmer MD    CYSTOSCOPY, RETROGRADE PYELOGRAM AND STENT INSERTION Left 07/26/2022    Procedure: CYSTOSCOPY RETROGRADE PYELOGRAM AND STENT INSERTION;  Surgeon: Dat Vega MD;  Location: [facility] OR;  Service: Urology;  Laterality: Left;    EXTRACORPOREAL SHOCK WAVE LITHOTRIPSY (ESWL)      EXTRACORPOREAL SHOCKWAVE LITHOTRIPSY (ESWL), STENT INSERTION/REMOVAL Left 07/01/2022    Procedure: EXTRACORPOREAL SHOCKWAVE LITHOTRIPSY " LEFT;  Surgeon: Lawson Pulido MD;  Location:  [facility] OR;  Service: Urology;  Laterality: Left;    FRACTURE SURGERY Right     placed plate and one month later removed, foot    LAMINECTOMY  1998    prior discectomies at L3-4 and L4-5 and a laminectomy at L5 and S1 ([facility]times 2    LAMINECTOMY  2008    TONSILLECTOMY      URETEROSCOPY LASER LITHOTRIPSY WITH STENT INSERTION Left 08/08/2022    Procedure: URETEROSCOPY LASER LITHOTRIPSY WITH STENT INSERTION LEFT;  Surgeon: Fernando Trinidad MD;  Location:  [facility] OR;  Service: Urology;  Laterality: Left;    VASECTOMY       Family History: family history includes Cancer in his father; Colon polyps in his father; Depression in his mother.    Social History:  reports that he quit smoking about 9 years ago. His smoking use included cigarettes and electronic cigarette. He started smoking about 24 years ago. He has a 15 pack-year smoking history. He has never used smokeless tobacco. He reports that he does not drink alcohol and does not use drugs.    Medications:    Current Outpatient Medications:     acetaminophen (TYLENOL) 325 MG tablet, Take 2 tablets by mouth Every 4 (Four) Hours As Needed for Mild Pain ., Disp: , Rfl:     ascorbic acid (VITAMIN C) 1000 MG tablet, Take 1 tablet by mouth Daily., Disp: , Rfl:     EPINEPHrine (EPIPEN) 0.3 MG/0.3ML solution auto-injector injection, Inject 1 syringe into the appropriate muscle as directed by prescriber., Disp: , Rfl:     folic acid (FOLVITE) 800 MCG tablet, Take 1 tablet by mouth Daily., Disp: , Rfl:     glucose 4-6 GM-MG per chewable tablet, Chew 2 tablets As Needed for Low Blood Sugar., Disp: , Rfl:     Glucose-Vitamin C-Vitamin D (TRUEplus Glucose) chewable tablet, Chew 1 tablet Daily As Needed for Low Blood Sugar., Disp: , Rfl:     ibuprofen (ADVIL,MOTRIN) 800 MG tablet, Take 1 tablet by mouth Every 6 (Six) Hours As Needed for Mild Pain., Disp: , Rfl:     losartan-hydrochlorothiazide (HYZAAR) 50-12.5 MG  "per tablet, , Disp: , Rfl:     metFORMIN ER (GLUCOPHAGE-XR) 500 MG 24 hr tablet, Take 1 tablet by mouth Daily With Breakfast., Disp: , Rfl:     Mounjaro 2.5 MG/0.5ML solution pen-injector, INJECT 2.5 MG SUB-Q EVERY WEEK AS DIRECTED FOR 28 DAYS, Disp: , Rfl:     naloxone (NARCAN) 4 MG/0.1ML nasal spray, 1 spray into the nostril(s) as directed by provider As Needed., Disp: , Rfl:     oxyCODONE (OXY-IR) 5 MG capsule, Take 1 capsule by mouth Every 4 (Four) Hours As Needed for Moderate Pain., Disp: , Rfl:     polyethylene glycol (MIRALAX) 17 GM/SCOOP powder, , Disp: , Rfl:     Polyethylene Glycol powder, Daily As Needed., Disp: , Rfl:     PROAIR  (90 Base) MCG/ACT inhaler, Inhale 1 puff Every 4 (Four) Hours As Needed for Wheezing or Shortness of Air., Disp: , Rfl:     Qsymia 7.5-46 MG capsule sustained-release 24 hr, , Disp: , Rfl:     tamsulosin (FLOMAX) 0.4 MG capsule 24 hr capsule, Take 1 capsule by mouth Daily. For kidney stone expulsion, Disp: 30 capsule, Rfl: 1    zinc gluconate 50 MG tablet, Take 1 tablet by mouth Daily., Disp: , Rfl:     Allergies:  Bee venom, Cardizem [diltiazem], Crestor [rosuvastatin], Lisinopril, and Zetia [ezetimibe]    OBJECTIVE:  Objective   Ht 188 cm (74\")   Wt (!) 154 kg (339 lb)   BMI 43.53 kg/m²   Physical Exam  Vitals and nursing note reviewed.   Constitutional:       General: He is not in acute distress.     Appearance: Normal appearance. He is well-developed and well-groomed. He is morbidly obese. He is not ill-appearing, toxic-appearing or diaphoretic.      Comments: BMI 44.24   HENT:      Head: Normocephalic and atraumatic.      Right Ear: Hearing normal.      Left Ear: Hearing normal.   Eyes:      Extraocular Movements: EOM normal.      Conjunctiva/sclera: Conjunctivae normal.      Pupils: Pupils are equal, round, and reactive to light.   Neck:      Trachea: Trachea normal.   Cardiovascular:      Rate and Rhythm: Normal rate and regular rhythm.   Pulmonary:      Effort: " Pulmonary effort is normal. No tachypnea, bradypnea, accessory muscle usage or respiratory distress.   Abdominal:      Palpations: Abdomen is soft.   Musculoskeletal:      Cervical back: Full passive range of motion without pain and neck supple.   Skin:     General: Skin is warm and dry.   Neurological:      Mental Status: He is alert and oriented to person, place, and time.      GCS: GCS eye subscore is 4. GCS verbal subscore is 5. GCS motor subscore is 6.      Gait: Gait is intact.      Deep Tendon Reflexes:      Reflex Scores:       Tricep reflexes are 2+ on the right side and 2+ on the left side.       Bicep reflexes are 2+ on the right side and 2+ on the left side.       Brachioradialis reflexes are 2+ on the right side and 2+ on the left side.       Patellar reflexes are 2+ on the right side and 2+ on the left side.       Achilles reflexes are 2+ on the right side and 0 on the left side.  Psychiatric:         Speech: Speech normal.         Behavior: Behavior normal. Behavior is cooperative.       Neurologic Exam     Mental Status   Oriented to person, place, and time.   Attention: normal. Concentration: normal.   Speech: speech is normal   Level of consciousness: alert    Cranial Nerves     CN II   Visual fields full to confrontation.     CN III, IV, VI   Pupils are equal, round, and reactive to light.  Extraocular motions are normal.     CN V   Facial sensation intact.     CN VII   Facial expression full, symmetric.     CN VIII   CN VIII normal.     CN IX, X   CN IX normal.     CN XI   CN XI normal.     Motor Exam   Right arm tone: normal  Left arm tone: normal  Right leg tone: normal  Left leg tone: normal (Atrophy of left calf)    Strength   Right deltoid: 5/5  Left deltoid: 5/5  Right biceps: 5/5  Left biceps: 5/5  Right triceps: 5/5  Left triceps: 5/5  Right wrist extension: 5/5  Left wrist extension: 5/5  Right iliopsoas: 5/5  Left iliopsoas: 5/5  Right quadriceps: 5/5  Left quadriceps: 5/5  Right  anterior tibial: 5/5  Left anterior tibial: 5/5  Right gastroc: 5/5  Left gastroc: 4/5  Right EHL 5/5  Left EHL 5/5       Sensory Exam   Right arm light touch: normal  Left arm light touch: decreased from fingers  Right leg light touch: normal  Left leg light touch: normal  Sensory deficit distribution on left: median    Gait, Coordination, and Reflexes     Gait  Gait: normal    Tremor   Resting tremor: absent  Intention tremor: absent  Action tremor: absent    Reflexes   Right brachioradialis: 2+  Left brachioradialis: 2+  Right biceps: 2+  Left biceps: 2+  Right triceps: 2+  Left triceps: 2+  Right patellar: 2+  Left patellar: 2+  Right achilles: 2+  Left achilles: 0  Right plantar: equivocal  Left plantar: equivocal  Right Morales: absent  Left Morales: absent  Right ankle clonus: absent  Left ankle clonus: absent  Right pendular knee jerk: absent  Left pendular knee jerk: absentUnable to toe walk     Male  strength (pounds)  AGE Right Hand RH Norms Left Hand LH Norms   20-24  121±20.6  104±21.8   25-29  120±23.0  110±16.2   30-34  121±22.4  110±21.7   35-39  119±24  113±21.7   40-44  117±20.7  112±18.7   45-49  110±23.0  101±22.8   50-54 *140 113±18.1 138 102±17   55-59  101±26.7  83±23.4   60-64  90±20.4  77±20.3   65-69  91±20.6  76.8±19.8   70-74  75±21.5  65±18.1   75+  66±21.0  55±17.0   (ADONAY Adams et al; Hand Dynometer: Effects of trials and sessions.  Perpetual and Motor Skills 61:195-8, 1985)  * = Dominant hand  > = Intervention     Imaging: (independent review and interpretation)  3/8/2024.  X-rays of the cervical spine show no evidence of acute fractures, straightening of the normal cervical lordosis and degenerative disc disease most notable from C4-C7.      2/26/2024.  MRI of the cervical spine shows no STIR signal change suggest acute fractures, no T2 signal change within the central cord, straightening of the normal cervical lordosis with a subtle retrolisthesis of C3 over C4, multilevel  degenerative changes, right paracentral disc protrusion at C4-5 resulting in moderate thecal sac compression and right foraminal stenosis, and a broad-based disc protrusion at C5-6 resulting in severe thecal sac compression and bilateral foraminal stenosis.  No significant cervical stenosis noted throughout the cervical spine.          Comparative imaging                              AP and lateral scoliosis films are reviewed.  Evidence of a lumbar lordosis of 60 degrees and a pelvic incidence of 65 degrees.  No evidence of positive sagittal balance.      ASSESSMENT/ PLAN:  Randell Reddy is a 54 y.o. male with a significant medical history of lumbar discectomy at L3-4 and L4-5 and open laminectomy at L5-S1 ([employer], [place] [facility] hypertension, hyperlipidemia, diabetes, sleep apnea, hearing loss, bipolar disorder, former smoker, and obesity.  He presents with a new problem of persistent numbness and tingling to digit 1 and 2 of the left hand. ADÁN: 12.  mJOA: 14.  Physical exam findings of decree sensation to digit 1 and to the left hand, absent left Achilles, and equivocal plantar reflexes, atrophy of the left calf and some gastrocnemius weakness on the left.  Otherwise neurologically intact.  His imaging shows right paracentral disc protrusion at C4-5 resulting in moderate thecal sac compression and right foraminal stenosis, and a broad-based disc protrusion at C5-6 resulting in severe thecal sac compression and bilateral foraminal stenosis.  No significant cervical stenosis noted throughout the cervical spine.  EMG and nerve conduction study obtained through [facility] on 2/15/2024 shown mild left carpal tunnel.  Prior MRI lumbar spine shows wide decompression at L5/S1 with moderate stenosis at L4/5.  AP and lateral scoliosis x-rays show lumbar lordosis of 55 degrees and a pelvic incidence of 65 degrees.  Sagittal balance is fine with only 2 cm of positive sagittal balance.    RECOMMENDATIONS  ...  Lumbar Spondylolisthesis, L4/5  Lumbar Stenosis secondary to above, L4/5    Diagnosis  To establish the diagnosis of lumbar facet-mediated pain, the double-injection technique with an improvement threshold of 80% or greater is suggested (single Level I study; B).  There is no evidence to support the use of diagnostic facet blocks as a predictor of lumbar fusion outcome in patients with chronic low-back pain from degenerative lumbar disease (conflicting Level IV evidence).    Treatment Recommendations  Continued conservative care  Surgical decompression and fusion - recommended     Fusion Technique  Posterior Lateral Fusion (PLF)  PLF with Pedicle Screws  Transforaminal Interbody Fusion (TLIF)  Direct Lateral Interbody Fusion (DLIF)  Anterior Lumbar Interbody Fusion (ALIF)    Guidelines for  Fusion  Interbody fusion is recommended as an option to enhance the fusion rate.  (multiple Level II reports; B).   Posterolateral lumbar fusion (PLF) plus interbody fusion is not recommended since the evidence indicates no substantial benefit but an increased rate of complications if a PLF is added to an interbody fusion (Level II and III reports; B).  Anterior lumbar interbody fusion has better clinical outcomes and fewer perioperative morbidities than instrumented PLF (Level III evidence from 2 reports; C).  Pedicle screw fixation is recommended when posterolateral lumbar fusion (PLF) is used to manage low-back pain in patients at high risk for pseudarthrosis (Level II reports; Grade B).  Routine use of pedicle screw fixation as an adjunct to PLF for patients with degenerative disc disease is an option. There is consistent evidence that the use of pedicle screws enhances the fusion rate.  The use of a brace following instrumented posterolateral lumbar fusion (PLF) for lumbar spondylosis is not supported due to equivalent outcomes with and without bracing (single Level II study; C).    Although there is insufficient  evidence to recommend a standard fusion technique, the patient's anatomy, desires, and concerns as well as surgeon experience should all be factored into the decision-making process when determining the optimal strategy for an individual patient to maximize fusion potential while minimizing risk of complications.    I discussed with the risks benefits and possible complications of conservative therapy versus surgical intervention. risks of the procedure, including but not limited to infection, bleeding, damage to local structures, injury to the nerves or thecal sac resulting in CSF leak, weakness, numbness, paralysis, or loss of bowel, and bladder function, instrumentation failure, dysphagia, dysphonia, visual loss, stroke, coma, MI, or death.      Randell has completed an adequate trial of conservative therapy and still has significant pain and deficits that correlate with his MRI findings.  Therefore Randell S Link has elected to proceed with L4/5 left MIS Robotic assisted TLIF    Assessment & Plan    1. Lumbar Stenosis.  The patient's symptoms and clinical presentation are consistent with lumbar stenosis, causing pinching of the nerve roots. A handout on lumbar stenosis was provided for further understanding of the condition. Treatment options discussed included conservative care, L4-5 decompression, laminectomy, discectomy, L4-5 minimally invasive fusion, or an L4-5 and L5-S1 fusion.  Based on his scoliosis films there is no evidence of pelvic incidence and lumbar lordosis mismatch based on this I would proceed with a simple L4/5 TLIF.    2. Lumbar Radiculopathy.  The patient's MRI and EMG findings indicate lumbar radiculopathy, with chronic left S1 radiculopathy without significant reinnervation. This is contributing to his leg pain and numbness. Conservative management was discussed, but due to significant symptoms and muscle atrophy, surgical options were recommended. The patient will undergo a  minimally invasive transforaminal lumbar interbody fusion at L4-L5. If the scoliosis film indicates further issues, an L4-S1 fusion may be considered.    3. Spondylolisthesis.  The patient has spondylolisthesis, which is a shifting forward or wiggle in the spine. This condition contributes to his symptoms and requires surgical intervention. The recommended treatment is a minimally invasive transforaminal lumbar interbody fusion at L4-L5. If additional instability is noted on the scoliosis film, an L4-S1 fusion may be necessary.    4. Medication Management.  The patient is currently taking 5 mg of oxycodone as needed for back pain and kidney stones, using approximately four to five pills a week. He is advised to continue this regimen as needed.        Class 3 obesity (BMI >= 40) due to excessive calories with serious comorbidities BMI of 40.0-44.9 in adult  Body mass index is 43.53 kg/m². Information on the DASH diet provided in the AVS.  We will continue to provided diet and exercise information with the goal of weight loss at each scheduled appointment.     Diagnoses and all orders for this visit:    1. Spinal stenosis, lumbar region, without neurogenic claudication (Primary)  -     CT Lumbar Spine Without Contrast; Future  -     XR spine scoliosis 2 or 3 views; Future  -     Case Request; Standing  -     CBC & Differential; Future  -     Comprehensive Metabolic Panel; Future  -     Vitamin D 25 Hydroxy; Future  -     MRSA Screen Culture (Outpatient) - Swab, Nares; Future  -     Type & Screen; Future  -     ECG 12 Lead; Future  -     XR Chest 1 View; Future  -     chlorhexidine (PERIDEX) 0.12 % solution 15 mL  -     ceFAZolin (ANCEF) 2 g in sodium chloride 0.9 % 100 mL IVPB  -     Case Request    2. Class 3 severe obesity due to excess calories with serious comorbidity and body mass index (BMI) of 40.0 to 44.9 in adult    Other orders  -     Inpatient Admission; Standing  -     Follow Anesthesia Guidelines /  Protocol; Future  -     Follow Anesthesia Guidelines / Protocol; Standing  -     Verify NPO Status; Standing  -     SCD (Sequential Compression Device) - Place on Patient in Pre-Op; Standing  -     Clip Operative Site; Standing  -     Notify Provider (Specify); Standing  -     Verify / Perform Chlorhexidine Skin Prep; Standing  -     Insert Indwelling Urinary Catheter; Standing  -     Assess Need for Indwelling Urinary Catheter - Follow Removal Protocol; Standing  -     Urinary Catheter Care; Standing  -     Provide NPO Instructions to Patient; Future  -     Chlorhexidine Skin Prep; Future  -     Provide Patient with Enhanced Recovery Booklet(s) or Handout; Future  -     Type & Screen; Standing          Return for 4MO WITH DR DURHAM TO UPDATE H&P FOR SURGERY IN SPRING.    Thank you for this Consultation and the opportunity to participate in Randell's care.      I spent 48 minutes caring for Randell on this date of service. This time includes time spent by me in the following activities: preparing for the visit, reviewing tests, obtaining and/or reviewing a separately obtained history, performing a medically appropriate examination and/or evaluation, counseling and educating the patient/family/caregiver, ordering medications, tests, or procedures, referring and communicating with other health care professionals, documenting information in the medical record, independently interpreting results and communicating that information with the patient/family/caregiver, and/or care coordination.     Medical Decision Making (2/3)  Problem Points (2,3,4 or more)  Chronic Stable, 2 or more (Mod)  Data Points (2,3,4 or more)  CATEGORY 1  INDEPENDENT INTERPRETATION Imaging = 3  REVIEWED other tests (EMG, NCS, YEE, echo, ekg, PFT) = 1.  ORDERED: Imaging (X-ray,CT, MRI) = 1.  ORDERED other tests (EMG, NCS, YEE, echo, ekg, PFT) = 1.  ORDERED Lab ordered = 2  CATEGORY 2  Independent interpretation of test performed by another  physician/NPP (Cat 2)  Risk (Low, Mod, High)  Surgery: Major surgery with Risk Factors (High)    E/M = MDM 2 out of 3   or  Time  Est Level 5 - 81641 = High + (Same as Above but 2 of 3) + High Risk   or  60-74 min        Sincerely,  Gigi Paz MD

## 2024-11-12 ENCOUNTER — HOSPITAL ENCOUNTER (OUTPATIENT)
Dept: CT IMAGING | Facility: HOSPITAL | Age: 54
Discharge: HOME | End: 2024-11-12
Admitting: NEUROLOGICAL SURGERY
Payer: OTHER GOVERNMENT

## 2024-11-12 DIAGNOSIS — M48.061 SPINAL STENOSIS, LUMBAR REGION, WITHOUT NEUROGENIC CLAUDICATION: ICD-10-CM

## 2024-11-12 PROCEDURE — 72131 CT LUMBAR SPINE W/O DYE: CPT

## 2024-11-21 ENCOUNTER — TELEPHONE (OUTPATIENT)
Dept: NEUROSURGERY | Facility: CLINIC | Age: 54
End: 2024-11-21
Payer: COMMERCIAL

## 2024-11-21 PROBLEM — M48.061 SPINAL STENOSIS, LUMBAR REGION, WITHOUT NEUROGENIC CLAUDICATION: Status: ACTIVE | Noted: 2024-10-28

## 2024-11-21 NOTE — TELEPHONE ENCOUNTER
I CALLED PT TO DISCUSS A SURGERY DATE WITH HIM FOR DECEMBER. HE STATED THAT WHEN HE WAS IN FOR HIS VISIT HE EXPLAINED THAT HE WOULD BE UNABLE TO PROCEED WITH SURGERY UNTIL SOMETIME IN THE SPRING OF 2025 BUT HE WOULD HAVE TO CALL US BACK TO LET US KNOW WHEN HE CAN BE OFF WORK. I TOLD HIM THAT WOULD BE NO PROBLEM TO JUST LET US KNOW AND THAT WE CAN SCHEDULE HIM TO COME IN AND BE SEEN FOR AN H&P AND FOR A NEW CASE REQUEST TO BE DROPPED.     PT VOICED AGREEMENT AND UNDERSTANDING

## 2025-06-20 ENCOUNTER — TRANSCRIBE ORDERS (OUTPATIENT)
Dept: ADMINISTRATIVE | Facility: HOSPITAL | Age: 55
End: 2025-06-20
Payer: COMMERCIAL

## 2025-06-20 ENCOUNTER — LAB (OUTPATIENT)
Dept: LAB | Facility: HOSPITAL | Age: 55
End: 2025-06-20
Payer: COMMERCIAL

## 2025-06-20 DIAGNOSIS — R11.2 NAUSEA AND VOMITING, UNSPECIFIED VOMITING TYPE: Primary | ICD-10-CM

## 2025-06-20 DIAGNOSIS — R11.2 NAUSEA AND VOMITING, UNSPECIFIED VOMITING TYPE: ICD-10-CM

## 2025-06-20 LAB
ALBUMIN SERPL-MCNC: 4.3 G/DL (ref 3.5–5.2)
ALBUMIN/GLOB SERPL: 1.3 G/DL
ALP SERPL-CCNC: 90 U/L (ref 39–117)
ALT SERPL W P-5'-P-CCNC: 40 U/L (ref 1–41)
AMYLASE SERPL-CCNC: 52 U/L (ref 28–100)
ANION GAP SERPL CALCULATED.3IONS-SCNC: 12 MMOL/L (ref 5–15)
AST SERPL-CCNC: 18 U/L (ref 1–40)
BASOPHILS # BLD AUTO: 0.03 10*3/MM3 (ref 0–0.2)
BASOPHILS NFR BLD AUTO: 0.3 % (ref 0–1.5)
BILIRUB SERPL-MCNC: 0.6 MG/DL (ref 0–1.2)
BUN SERPL-MCNC: 19.1 MG/DL (ref 6–20)
BUN/CREAT SERPL: 19.1 (ref 7–25)
CALCIUM SPEC-SCNC: 9.1 MG/DL (ref 8.6–10.5)
CHLORIDE SERPL-SCNC: 105 MMOL/L (ref 98–107)
CO2 SERPL-SCNC: 24 MMOL/L (ref 22–29)
CREAT SERPL-MCNC: 1 MG/DL (ref 0.76–1.27)
DEPRECATED RDW RBC AUTO: 44.3 FL (ref 37–54)
EGFRCR SERPLBLD CKD-EPI 2021: 90 ML/MIN/1.73
EOSINOPHIL # BLD AUTO: 0.06 10*3/MM3 (ref 0–0.4)
EOSINOPHIL NFR BLD AUTO: 0.6 % (ref 0.3–6.2)
ERYTHROCYTE [DISTWIDTH] IN BLOOD BY AUTOMATED COUNT: 13.6 % (ref 12.3–15.4)
GLOBULIN UR ELPH-MCNC: 3.3 GM/DL
GLUCOSE SERPL-MCNC: 101 MG/DL (ref 65–99)
HCT VFR BLD AUTO: 43.1 % (ref 37.5–51)
HGB BLD-MCNC: 14.3 G/DL (ref 13–17.7)
IMM GRANULOCYTES # BLD AUTO: 0.03 10*3/MM3 (ref 0–0.05)
IMM GRANULOCYTES NFR BLD AUTO: 0.3 % (ref 0–0.5)
LIPASE SERPL-CCNC: 45 U/L (ref 13–60)
LYMPHOCYTES # BLD AUTO: 1.73 10*3/MM3 (ref 0.7–3.1)
LYMPHOCYTES NFR BLD AUTO: 17.4 % (ref 19.6–45.3)
MCH RBC QN AUTO: 29.6 PG (ref 26.6–33)
MCHC RBC AUTO-ENTMCNC: 33.2 G/DL (ref 31.5–35.7)
MCV RBC AUTO: 89.2 FL (ref 79–97)
MONOCYTES # BLD AUTO: 0.8 10*3/MM3 (ref 0.1–0.9)
MONOCYTES NFR BLD AUTO: 8.1 % (ref 5–12)
NEUTROPHILS NFR BLD AUTO: 7.28 10*3/MM3 (ref 1.7–7)
NEUTROPHILS NFR BLD AUTO: 73.3 % (ref 42.7–76)
NRBC BLD AUTO-RTO: 0 /100 WBC (ref 0–0.2)
PLATELET # BLD AUTO: 197 10*3/MM3 (ref 140–450)
PMV BLD AUTO: 10.4 FL (ref 6–12)
POTASSIUM SERPL-SCNC: 4.1 MMOL/L (ref 3.5–5.2)
PROT SERPL-MCNC: 7.6 G/DL (ref 6–8.5)
RBC # BLD AUTO: 4.83 10*6/MM3 (ref 4.14–5.8)
SODIUM SERPL-SCNC: 141 MMOL/L (ref 136–145)
WBC NRBC COR # BLD AUTO: 9.93 10*3/MM3 (ref 3.4–10.8)

## 2025-06-20 PROCEDURE — 36415 COLL VENOUS BLD VENIPUNCTURE: CPT

## 2025-06-20 PROCEDURE — 83690 ASSAY OF LIPASE: CPT

## 2025-06-20 PROCEDURE — 82150 ASSAY OF AMYLASE: CPT

## 2025-06-20 PROCEDURE — 80053 COMPREHEN METABOLIC PANEL: CPT

## 2025-06-20 PROCEDURE — 85025 COMPLETE CBC W/AUTO DIFF WBC: CPT

## 2025-08-25 ENCOUNTER — TELEPHONE (OUTPATIENT)
Age: 55
End: 2025-08-25

## (undated) DEVICE — 1016 S-DRAPE IRRIG POUCH 10/BOX: Brand: STERI-DRAPE™

## (undated) DEVICE — FIBR LASR MOSES 365 DFL 6J 80HZ 120W

## (undated) DEVICE — SNAR POLYP CAPTIVATOR RND STFF 2.4 240CM 10MM 1P/U

## (undated) DEVICE — GLV SURG BIOGEL M LTX PF 7 1/2

## (undated) DEVICE — NITINOL STONE RETRIEVAL BASKET: Brand: ZERO TIP

## (undated) DEVICE — GLW STD STR 3CM .035X150CM

## (undated) DEVICE — GW SENSR DUALFLEX NITNL STR .038IN 3X150CM

## (undated) DEVICE — GOWN,NON-REINFORCED,SIRUS,SET IN SLV,XXL: Brand: MEDLINE

## (undated) DEVICE — THE CHANNEL CLEANING BRUSH IS A NYLON FLEXI BRUSH ATTACHED TO A FLEXIBLE PLASTIC SHEATH DESIGNED TO SAFELY REMOVE DEBRIS FROM FLEXIBLE ENDOSCOPES.

## (undated) DEVICE — TBG DRN URINARY 9/32IN

## (undated) DEVICE — PK CYSTO 30

## (undated) DEVICE — SYS IRR PUMP SGL ACTN VAC SYR 10CC

## (undated) DEVICE — GLV SURG BIOGEL LTX PF 7 1/2

## (undated) DEVICE — BAPTIST TURNOVER KIT: Brand: MEDLINE INDUSTRIES, INC.

## (undated) DEVICE — POLYVINYL UROLOGICAL DRAINAGE BAG FOR SIEMENS SYSTEMS - NON-STERILE: Brand: CUSTOM MEDICAL SPECIALTIES, INC.

## (undated) DEVICE — BHS TURNOVER CYSTO: Brand: MEDLINE INDUSTRIES, INC.

## (undated) DEVICE — Device: Brand: DEFENDO AIR/WATER/SUCTION AND BIOPSY VALVE

## (undated) DEVICE — MASK,OXYGEN,MED CONC,ADLT,7' TUB, UC: Brand: PENDING

## (undated) DEVICE — GUARDIAN LVC: Brand: GUARDIAN

## (undated) DEVICE — CATH URETRL OPN/END 5F70CM

## (undated) DEVICE — YANKAUER,BULB TIP WITH VENT: Brand: ARGYLE

## (undated) DEVICE — ENDOSCOPIC SEAL URO 1 SIZE FITS ALL: Brand: ENDOSCOPIC SEAL

## (undated) DEVICE — THE SINGLE USE ETRAP – POLYP TRAP IS USED FOR SUCTION RETRIEVAL OF ENDOSCOPICALLY REMOVED POLYPS.: Brand: ETRAP

## (undated) DEVICE — GW AMPLTZ SUPERSTIFF STR .035IN 145CM

## (undated) DEVICE — GLV SURG BIOGEL M LTX PF 8

## (undated) DEVICE — SENSR O2 OXIMAX FNGR A/ 18IN NONSTR

## (undated) DEVICE — URETERAL ACCESS SHEATH SET: Brand: NAVIGATOR

## (undated) DEVICE — CUFF,BP,DISP,1 TUBE,ADULT,HP: Brand: MEDLINE